# Patient Record
Sex: FEMALE | Race: WHITE | Employment: OTHER | URBAN - METROPOLITAN AREA
[De-identification: names, ages, dates, MRNs, and addresses within clinical notes are randomized per-mention and may not be internally consistent; named-entity substitution may affect disease eponyms.]

---

## 2017-01-01 ENCOUNTER — HOSPITAL ENCOUNTER (OUTPATIENT)
Dept: LAB | Age: 82
Discharge: HOME | End: 2017-07-25
Payer: MEDICARE

## 2017-01-01 ENCOUNTER — OFFICE VISIT (OUTPATIENT)
Dept: FAMILY MEDICINE CLINIC | Age: 82
End: 2017-01-01

## 2017-01-01 ENCOUNTER — TELEPHONE (OUTPATIENT)
Dept: FAMILY MEDICINE CLINIC | Age: 82
End: 2017-01-01

## 2017-01-01 ENCOUNTER — HOSPITAL ENCOUNTER (OUTPATIENT)
Dept: LAB | Age: 82
Discharge: HOME | End: 2017-09-15
Payer: MEDICARE

## 2017-01-01 ENCOUNTER — LAB ONLY (OUTPATIENT)
Dept: FAMILY MEDICINE CLINIC | Age: 82
End: 2017-01-01

## 2017-01-01 VITALS
OXYGEN SATURATION: 95 % | SYSTOLIC BLOOD PRESSURE: 155 MMHG | BODY MASS INDEX: 22.68 KG/M2 | RESPIRATION RATE: 18 BRPM | HEART RATE: 96 BPM | DIASTOLIC BLOOD PRESSURE: 91 MMHG | HEIGHT: 63 IN | WEIGHT: 128 LBS | TEMPERATURE: 98.3 F

## 2017-01-01 DIAGNOSIS — R79.89 ELEVATED LIVER FUNCTION TESTS: Primary | ICD-10-CM

## 2017-01-01 DIAGNOSIS — Z00.00 ANNUAL PHYSICAL EXAM: ICD-10-CM

## 2017-01-01 DIAGNOSIS — E55.9 VITAMIN D DEFICIENCY: ICD-10-CM

## 2017-01-01 DIAGNOSIS — Z00.00 MEDICARE ANNUAL WELLNESS VISIT, SUBSEQUENT: Primary | ICD-10-CM

## 2017-01-01 DIAGNOSIS — K21.00 GASTROESOPHAGEAL REFLUX DISEASE WITH ESOPHAGITIS: ICD-10-CM

## 2017-01-01 LAB
1,25(OH)2D3 SERPL-MCNC: 46 PG/ML (ref 19.9–79.3)
ALBUMIN SERPL-MCNC: 4.2 G/DL (ref 3.5–4.7)
ALBUMIN SERPL-MCNC: 4.4 G/DL (ref 3.5–4.7)
ALBUMIN/GLOB SERPL: 1.5 {RATIO} (ref 1.2–2.2)
ALBUMIN/GLOB SERPL: 1.6 {RATIO} (ref 1.2–2.2)
ALP SERPL-CCNC: 82 IU/L (ref 39–117)
ALP SERPL-CCNC: 84 IU/L (ref 39–117)
ALT SERPL-CCNC: 35 IU/L (ref 0–32)
ALT SERPL-CCNC: 40 IU/L (ref 0–32)
AST SERPL-CCNC: 31 IU/L (ref 0–40)
AST SERPL-CCNC: 39 IU/L (ref 0–40)
BILIRUB SERPL-MCNC: 0.5 MG/DL (ref 0–1.2)
BILIRUB SERPL-MCNC: 0.5 MG/DL (ref 0–1.2)
BUN SERPL-MCNC: 6 MG/DL (ref 8–27)
BUN SERPL-MCNC: 7 MG/DL (ref 8–27)
BUN/CREAT SERPL: 10 (ref 12–28)
BUN/CREAT SERPL: 8 (ref 12–28)
CALCIUM SERPL-MCNC: 9.8 MG/DL (ref 8.7–10.3)
CALCIUM SERPL-MCNC: 9.9 MG/DL (ref 8.7–10.3)
CHLORIDE SERPL-SCNC: 96 MMOL/L (ref 96–106)
CHLORIDE SERPL-SCNC: 97 MMOL/L (ref 96–106)
CO2 SERPL-SCNC: 22 MMOL/L (ref 18–29)
CO2 SERPL-SCNC: 25 MMOL/L (ref 18–29)
CREAT SERPL-MCNC: 0.68 MG/DL (ref 0.57–1)
CREAT SERPL-MCNC: 0.73 MG/DL (ref 0.57–1)
ERYTHROCYTE [DISTWIDTH] IN BLOOD BY AUTOMATED COUNT: 13.8 % (ref 12.3–15.4)
GLOBULIN SER CALC-MCNC: 2.8 G/DL (ref 1.5–4.5)
GLOBULIN SER CALC-MCNC: 2.8 G/DL (ref 1.5–4.5)
GLUCOSE SERPL-MCNC: 115 MG/DL (ref 65–99)
GLUCOSE SERPL-MCNC: 161 MG/DL (ref 65–99)
HCT VFR BLD AUTO: 41.6 % (ref 34–46.6)
HGB BLD-MCNC: 14 G/DL (ref 11.1–15.9)
MCH RBC QN AUTO: 29.9 PG (ref 26.6–33)
MCHC RBC AUTO-ENTMCNC: 33.7 G/DL (ref 31.5–35.7)
MCV RBC AUTO: 89 FL (ref 79–97)
PLATELET # BLD AUTO: 287 X10E3/UL (ref 150–379)
POTASSIUM SERPL-SCNC: 4 MMOL/L (ref 3.5–5.2)
POTASSIUM SERPL-SCNC: 4.9 MMOL/L (ref 3.5–5.2)
PROT SERPL-MCNC: 7 G/DL (ref 6–8.5)
PROT SERPL-MCNC: 7.2 G/DL (ref 6–8.5)
RBC # BLD AUTO: 4.69 X10E6/UL (ref 3.77–5.28)
SODIUM SERPL-SCNC: 137 MMOL/L (ref 134–144)
SODIUM SERPL-SCNC: 137 MMOL/L (ref 134–144)
WBC # BLD AUTO: 8.5 X10E3/UL (ref 3.4–10.8)

## 2017-01-01 PROCEDURE — 85027 COMPLETE CBC AUTOMATED: CPT

## 2017-01-01 PROCEDURE — 82652 VIT D 1 25-DIHYDROXY: CPT

## 2017-01-01 PROCEDURE — 80053 COMPREHEN METABOLIC PANEL: CPT

## 2017-01-01 PROCEDURE — 36415 COLL VENOUS BLD VENIPUNCTURE: CPT

## 2017-01-01 RX ORDER — ERGOCALCIFEROL 1.25 MG/1
50000 CAPSULE ORAL
Qty: 12 CAP | Refills: 0 | Status: SHIPPED | OUTPATIENT
Start: 2017-01-01 | End: 2018-01-01

## 2017-01-01 RX ORDER — HYDROGEN PEROXIDE 3 %
SOLUTION, NON-ORAL MISCELLANEOUS
Qty: 30 CAP | Refills: 1 | Status: SHIPPED | OUTPATIENT
Start: 2017-01-01 | End: 2018-01-01

## 2017-02-08 ENCOUNTER — TELEPHONE (OUTPATIENT)
Dept: FAMILY MEDICINE CLINIC | Age: 82
End: 2017-02-08

## 2017-02-08 NOTE — TELEPHONE ENCOUNTER
, Staci Garrison [phone]    Called to say he was to let nurse know when the patient has had these vaccines.       Flu  t-dap  Shingles   pneumonia

## 2017-07-25 NOTE — PROGRESS NOTES
FARIDEH Metz is a 80 y.o. female who presents for f/u of \"bladder issues\"  Pt here with , pt has noticeable memory issues,  is constantly correcting her responses to my questions. Per urology note scanned into Media, pt saw urology (Dr. Tio Madden, [facility]) 5/27 for frequent urination and urinary retention. Has hx of UTIs and urinary retention, was prescribed cipro for UTI. They state urologist told her to f/u with us but she doesn't know why she's here. Pt says she took the antibiotics but has not been taking anything since then. Dr. Marlo Castañeda note mentions to schedule her for a cystoscopy with him, pt says she had one done but no notes in media. Pt's  states the bladder had \"dropped half an inch\" and that Dr. Tio Madden wasn't conerned about it. Pt denies hx of retained urine even though in urology note it mentions she had 164cc retained. Say Urology hasn't said anything about further surgery. States first thing in the morning when she stands up she feels her muscles are weak and she feels she can't make it to the restroom. Uses the restroom an \"awful lot,\" about every 30 minutes. She feels pressure, no pain. No dysuria, no blood. Denies any episodes of urinary incontinence but she does wear a pad constantly. No issues with BMs, has one once/day. No diarrhea, no constipation, no episodes of fecal incontinence.  says she had one bad episode a few weeks ago (diarrhea), pt does not remember and says that is unusual. Denies feeling of urinary retention.  states pt has another appt with Dr. Tio Madden in another week, pt didn't remember. Tried to obtain urine sample today but \"it didn't work\". Unclear if she was unable to produce urine or if she missed the sample cup, pt unable to answer question and becomes confused with questioning.      Memory Loss  - MMSE 24 in 2/2016, 24 in 5/2015 and 26 in 12/2015  - Formal neuropsych testing in 2015 ( [facility]) - mild cognitive impairment and questionable attention deficit.   - Previously taking aricept on and off but \"felt bad while taking it\"  - Advised to continue mind exercises - puzzles, reading, etc.     Today, pt's  feels like her memory has gotten even worse.  says she can't remember things she has done 30 minutes prior. MMSE today - 22  - Pt oriented to season, day of the week, month, but not to year (1982) and date (20th) = 3/5  - Pt oriented to state, town but not to county or current building location = 3/5  - Pt able to repeat 3 unrelated objects = 3/3  - Spelling WORLD backwards = 4/5 (forgot R)  - Recalling 3 unrelated objects = 0/3  - Naming simple objects = 2/2  - Repeating Ifs Ands or Buts = 1/1  - Following 3-part directions = 3/3  - Following written instructions = 1/1  - Writing a sentence = 1/1 (Lets go out to lunch)  - Copying picture = 1/1  Total = 22/30     Low Vitamin D (14.9 on 2/2/16)  - Was previous taking high dose Vit D supplements but has not been taking for \"at least a few months\"  - Pt unsure why she stopped it    PMHx:  Past Medical History:   Diagnosis Date    Anxiety 4/23/2010    GERD (gastroesophageal reflux disease) 4/23/2010    Mild dementia     UTI (lower urinary tract infection) 4/23/2010     Meds:   Current Outpatient Prescriptions   Medication Sig Dispense Refill    ergocalciferol (ERGOCALCIFEROL) 50,000 unit capsule Take 1 Cap by mouth every seven (7) days. 12 Cap 0    multivit-mineral-iron-lutein (CENTRUM SILVER ULTRA WOMEN'S) tab tablet Take 1 Tab by mouth daily. 30 Tab 6    esomeprazole (NEXIUM) 20 mg capsule Take 1 Cap by mouth daily. 30 Cap 4    ESTRACE 0.01 % (0.1 mg/gram) vaginal cream   3    lidocaine (XYLOCAINE) 5 % ointment   3    vitamin e (E GEMS) 1,000 unit capsule Take two caps per day 60 Cap 11     Allergies:    Allergies   Allergen Reactions    Ciprofloxacin Unable to Obtain    Macrodantin [Nitrofurantoin Macrocrystalline] Rash    Pcn [Penicillins] Rash    Sulfa (Sulfonamide Antibiotics) Rash       Smoker:  History   Smoking Status    Never Smoker   Smokeless Tobacco    Never Used     ETOH:   History   Alcohol Use No     FH:   Family History   Problem Relation Age of Onset    Cancer Father      Medicare Wellness Screening Questions  General Health Questions   During the past 4 weeks:  - How would you rate your health in general? Good  - How often have you been bothered by feeling dizzy when standing up? occasionally  - How much have you been bothered by bodily pain? mildly  - Have you noticed any hearing difficulties? yes  - Has your physical and emotional health limited your social activities with family or friends? no     Emotional Health Questions   - Do you have a history of depression, anxiety, or emotional problems? no  - Over the past 2 weeks, have you felt down, depressed or hopeless? no  - Over the past 2 weeks, have you felt little interest or pleasure in doing things? no     Health Habits   - Please describe your diet habits:  cooks, eat TV dinners  - Do you get 5 servings of fruits or vegetables daily? no  - Do you exercise regularly? no     Activities of Daily Living and Functional Status   - Do you need help with eating, walking, dressing, bathing, toileting, the phone, transportation, shopping, preparing meals, housework, laundry, medications or managing money? yes  - In the past four weeks, was someone available to help you if you needed and wanted help with anything? yes  - Are you confident are you that you can control and manage most of your health problems? no  - Have you been given information to help you keep track of your medications? yes  - How often do you have trouble taking your medications as prescribed?  \"All the time\" -  watches patient take medications     Fall Risk and Home Safety   - Have you fallen 2 or more times in the past year? no  - Does your home have rugs in the hallway, lack grab bars in the bathroom, lack handrails on the stairs or have poor lighting? no  - Do you have smoke detectors and check them regularly? yes  - Do you have difficulties driving a car? Pt does not drive  - Do you always fasten your seat belt when you are in a car? yes      ROS:  Review of Systems  Physical Exam:  Visit Vitals    BP (!) 155/91 (BP 1 Location: Right arm, BP Patient Position: Sitting)    Pulse 96    Temp 98.3 °F (36.8 °C) (Oral)    Resp 18    Ht 5' 3\" (1.6 m)    Wt 128 lb (58.1 kg)    LMP 01/31/1995    SpO2 95%    BMI 22.67 kg/m2       Wt Readings from Last 3 Encounters:   07/25/17 128 lb (58.1 kg)   03/28/16 132 lb (59.9 kg)   02/02/16 132 lb 4 oz (60 kg)     BP Readings from Last 3 Encounters:   07/25/17 (!) 155/91   03/28/16 135/75   02/02/16 156/84      Physical Exam    Body mass index is 22.67  Was the patient's timed Up & Go test unsteady or longer than 30 seconds? no     Evaluation of Cognitive Function   Mood/affect:  happy  Orientation: Person and Situation  Appearance: age appropriate and casually dressed  Family member/caregiver input:  present    Preventive Services     (Preventive care checklist to be included in patient instructions)  Discussed today Done Previously Not Needed       2/2017   Pneumococcal vaccines     2/2017   Flu vaccine        Hepatitis B vaccine (if at risk)     2/2017   Shingles vaccine    2/2017   TDAP vaccine     X Mammogram      X Pap smear      X Colorectal cancer screening      X Low-dose CT for lung cancer screening    X   Bone density test    X   Glaucoma screening   X   Cholesterol test    X  Diabetes screening test      X Diabetes self-management class      X Nutritionist referral for diabetes or renal disease      Discussion of Advance Directive   Discussed with pther ability to prepare and advance directive in the case that an injury or illness causes her to be unable to make health care decisions.    Pt does not have an advanced directive, was given papework         Assessment     80 y.o. female with:    ICD-10-CM ICD-9-CM    1. Annual physical exam Z00.00 V70.0 VITAMIN D, 1, 25 DIHYDROXY      CBC W/O DIFF      METABOLIC PANEL, COMPREHENSIVE   2. Vitamin D deficiency E55.9 268.9 VITAMIN D, 1, 25 DIHYDROXY              Plan       Orders Placed This Encounter    VITAMIN D, 1, 25 DIHYDROXY    CBC W/O DIFF    METABOLIC PANEL, COMPREHENSIVE    ergocalciferol (ERGOCALCIFEROL) 50,000 unit capsule    multivit-mineral-iron-lutein (CENTRUM SILVER ULTRA WOMEN'S) tab tablet     Bladder Issues  - Follow up with urology (pt already has appointment)  - Defer treatment to urology  - Unable to perform UA today    Memory Loss  - MMSE 22, declined from previous 24 in 2016 and 26 in 2015, but not abnormal for her age    Vitamin D deficiency   - Check Vit D levels  - refilled prescription Vitamin D    Medicare Wellness  - CBC, CMP, lipid panel  - Discussed with patient going back on multivitamin, Vitamin E - sent to pharmacy  - Pt needs advanced directive - given paperwork and discussed with patient    Patient discussed and seen with Dr. Kishan Galarza    I have discussed the diagnosis with the patient and the intended plan as seen in the above orders. The patient has received an after-visit summary and questions were answered concerning future plans. I have discussed medication side effects and warnings with the patient as well.     Elizabeth Meek MD  Family Medicine Resident

## 2017-07-25 NOTE — PATIENT INSTRUCTIONS
We sent prescription Vitamin D to your pharmacy (take 1 tablet every week)  We also sent a multivitamin to your pharmacy (take 1 tablet daily)  And you still have refills for Vitamin E (take 1 tablet daily) at your pharmacy    Calcium/Vitamin D Supplement (By mouth)   Calcium (VINCENT-see-um), Vitamin D (VYE-ta-min D)  Supplies your body with calcium if you need more than you get in your diet. Calcium helps prevent osteoporosis (weak or brittle bones). Vitamin D helps your body use the calcium. Calcium and vitamin D are minerals that your body needs to work properly. Brand Name(s): Nima-Citrate, Nima-Citrate Plus Vitamin D, Calcet Petites, Calcium 600MG+D, Calcium Citrate +D3 Maximum, Caltrate 600 + D, Citracal + D, Citracal Calcium Citrate Petites with Vitamin D, Citracal Petites, Citracal Ultradense Calcium Citrate, Citracal Ultradense Calcium Citrate Petite w/Vit D, Citrus Calcium with Vitamin D, D-1000, D-2000, D3-400IU   There may be other brand names for this medicine. When This Medicine Should Not Be Used: You should not use this medicine if you have had an allergic reaction to calcium or vitamin D (ergocalciferol). How to Use This Medicine:   Tablet, Long Acting Tablet, Fizzy Tablet, Liquid Filled Capsule, Chewable Tablet  · Your doctor will tell you how much medicine to use. Do not use more than directed. · Follow the instructions on the medicine label if you are using this medicine without a prescription. Ask your pharmacist or health caregiver if you are not sure how much calcium you should take in one day. · Most calcium supplements should be taken with food, but some kinds of calcium (such as calcium citrate) can be taken with or without food. Ask your health care provider or read the label on the bottle to see if you need to take your specific kind of calcium with food. Drink a full glass of water (8 ounces) with each dose.   · If you are using the effervescent (fizzy) tablet, dissolve the tablet in about 6 to 8 ounces of water (3/4 cup to 1 cup). After the tablet is completely dissolved, drink this mixture right away. Do not save any mixture to take later. · Carefully follow your doctor's instructions about any special diet. · If you need to take more than one dose each a day, take each dose at evenly spaced times, unless your doctor has told you otherwise. If a dose is missed:   · Take a dose as soon as you remember. If it is almost time for your next dose, wait until then and take a regular dose. Do not take extra medicine to make up for a missed dose. How to Store and Dispose of This Medicine:   · Store the medicine in a closed container at room temperature, away from heat, moisture, and direct light. If the effervescent (fizzy) tablet comes packaged in foil, do not open the foil until you are ready to use each tablet. · Ask your pharmacist, doctor, or health caregiver about the best way to dispose of any outdated medicine or medicine no longer needed. · Keep all medicine out of the reach of children. Never share your medicine with anyone. Drugs and Foods to Avoid:   Ask your doctor or pharmacist before using any other medicine, including over-the-counter medicines, vitamins, and herbal products. · Make sure your doctor knows if you are also using other supplements or medicines that contain calcium. Tell your doctor if you are also using gallium nitrate (Ganite®), cellulose sodium phosphate (Calcibind®), or etidronate (Didronel®). · Calcium can change the way other medicines work if you take them at the same time. If you need to use other medicines, take them at least 2 hours before or 2 hours after you take your calcium supplement. This is particularly important if you are also using phenytoin (Dilantin®) or a tetracycline antibiotic to treat an infection (such as doxycycline, minocycline, Vibramycin®).   · Do not take your calcium supplement with a high-fiber meal (such as bran, whole-grain cereal or bread, fresh fruits). Do not smoke cigarettes or cigars. Do not drink large amounts of alcohol or caffeine (for example, more than about 8 cups of coffee). Warnings While Using This Medicine:   · Make sure your doctor knows if you are pregnant or breast feeding, or if you have kidney disease or have ever had kidney stones. Tell your doctor if you have had problems with too much calcium (hypercalcemia) or too little calcium in your blood (hypocalcemia). Some health problems that can cause hypercalcemia are sarcoidosis, or problems with your parathyroid gland. · You should not use certain brands of this medicine if you have kidney disease or are on dialysis, because they may harm your kidneys. Ask your caregiver what brands are best for you. · Some health problems can affect how much calcium you should take. Tell your doctor if you have stomach or digestion problems, such as on-going diarrhea, not absorbing nutrients properly, or not having enough acid in your stomach. · This medicine might contain phenylalanine (aspartame). This is only a concern if you have a disorder called phenylketonuria (a problem with amino acids). If you have this condition, talk to your doctor before using this medicine. · If you are using a large amount of calcium or using it for a long time, your doctor might need to check your blood on a regular basis. Be sure to keep all appointments. Possible Side Effects While Using This Medicine:   Call your doctor right away if you notice any of these side effects:  · Headache that will not go away, dry mouth, loss of appetite, severe constipation. If you notice other side effects that you think are caused by this medicine, tell your doctor. Call your doctor for medical advice about side effects.  You may report side effects to FDA at 3-171-FDA-0235  © 2017 [employer] Information is for End User's use only and may not be sold, redistributed or otherwise used for commercial purposes. The above information is an  only. It is not intended as medical advice for individual conditions or treatments. Talk to your doctor, nurse or pharmacist before following any medical regimen to see if it is safe and effective for you.

## 2017-07-25 NOTE — PROGRESS NOTES
Chief Complaint   Patient presents with    Urinary Frequency     1. Have you been to the ER, urgent care clinic since your last visit? Hospitalized since your last visit? No    2. Have you seen or consulted any other health care providers outside of the [address] [place] [address] since your last visit? Include any pap smears or colon screening.  No

## 2017-07-25 NOTE — LETTER
8/15/2017 8:11 AM 
 
Ms. Randi Cruz [address] [facility] Dear Smith Garcia: 
 
Please find your most recent results below. Resulted Orders VITAMIN D, 1, 25 DIHYDROXY Result Value Ref Range Calcitriol (Vit D 1, 25 di-OH) 46.0 19.9 - 79.3 pg/mL Narrative Performed at:  [address], [place]  [id number] : Piyush Nur MD, Phone:  [phone] CBC W/O DIFF Result Value Ref Range WBC 8.5 3.4 - 10.8 x10E3/uL  
 RBC 4.69 3.77 - 5.28 x10E6/uL HGB 14.0 11.1 - 15.9 g/dL HCT 41.6 34.0 - 46.6 % MCV 89 79 - 97 fL  
 MCH 29.9 26.6 - 33.0 pg  
 MCHC 33.7 31.5 - 35.7 g/dL  
 RDW 13.8 12.3 - 15.4 % PLATELET 662 655 - 977 x10E3/uL Narrative Performed at:  [address]  [id number] : Piyush Nur MD, Phone:  [phone] METABOLIC PANEL, COMPREHENSIVE Result Value Ref Range Glucose 115 (H) 65 - 99 mg/dL BUN 7 (L) 8 - 27 mg/dL Creatinine 0.68 0.57 - 1.00 mg/dL GFR est non-AA 81 >59 mL/min/1.73 GFR est AA 94 >59 mL/min/1.73  
 BUN/Creatinine ratio 10 (L) 12 - 28 Sodium 137 134 - 144 mmol/L Potassium 4.9 3.5 - 5.2 mmol/L Chloride 97 96 - 106 mmol/L  
 CO2 25 18 - 29 mmol/L Calcium 9.8 8.7 - 10.3 mg/dL Protein, total 7.2 6.0 - 8.5 g/dL Albumin 4.4 3.5 - 4.7 g/dL GLOBULIN, TOTAL 2.8 1.5 - 4.5 g/dL A-G Ratio 1.6 1.2 - 2.2 Bilirubin, total 0.5 0.0 - 1.2 mg/dL Alk. phosphatase 82 39 - 117 IU/L  
 AST (SGOT) 39 0 - 40 IU/L  
 ALT (SGPT) 40 (H) 0 - 32 IU/L Narrative Performed at:  [address]  [id number] : Piyush Nur MD, Phone:  [phone] RECOMMENDATIONS: 
 
Labs look ok No anemia Slight increase in liver function test -- recommend repeating the test fasting in one month Will order the test  
 
 
 Please call me if you have any questions: [phone] Sincerely, Emile Ceja MD

## 2017-07-25 NOTE — MR AVS SNAPSHOT
Visit Information Date & Time Provider Department Dept. Phone Encounter #  
 7/25/2017  9:00 AM Philip Vazquez [phone] [id number] Upcoming Health Maintenance Date Due ZOSTER VACCINE AGE 60> 4/5/1994 OSTEOPOROSIS SCREENING (DEXA) 6/5/1999 MEDICARE YEARLY EXAM 11/3/2016 GLAUCOMA SCREENING Q2Y 9/1/2017 INFLUENZA AGE 9 TO ADULT 8/1/2017 Pneumococcal 65+ Low/Medium Risk (2 of 2 - PPSV23) 11/30/2017 DTaP/Tdap/Td series (2 - Td) 11/15/2026 Allergies as of 7/25/2017  Review Complete On: 7/25/2017 By: Nova Gonzales LPN Severity Noted Reaction Type Reactions Ciprofloxacin  01/19/2012    Unable to Obtain Macrodantin [Nitrofurantoin Macrocrystalline]  04/23/2010    Rash Pcn [Penicillins]  04/23/2010    Rash  
 Sulfa (Sulfonamide Antibiotics)  04/23/2010    Rash Current Immunizations  Reviewed on 12/12/2016 Name Date Influenza High Dose Vaccine PF 11/15/2016 Pneumococcal Conjugate (PCV-13) 11/30/2016 Tdap 11/15/2016 Not reviewed this visit You Were Diagnosed With   
  
 Codes Comments Annual physical exam    -  Primary ICD-10-CM: Z00.00 ICD-9-CM: V70.0 Vitamin D deficiency     ICD-10-CM: E55.9 ICD-9-CM: 268.9 Vitals BP Pulse Temp Resp Height(growth percentile) Weight(growth percentile) (!) 155/91 (BP 1 Location: Right arm, BP Patient Position: Sitting) 96 98.3 °F (36.8 °C) (Oral) 18 5' 3\" (1.6 m) 128 lb (58.1 kg) LMP SpO2 BMI OB Status Smoking Status 01/31/1995 95% 22.67 kg/m2 Postmenopausal Never Smoker Vitals History BMI and BSA Data Body Mass Index Body Surface Area  
 22.67 kg/m 2 1.61 m 2 Preferred Pharmacy Pharmacy Name Phone [id number] [address] AT [place] [phone] Your Updated Medication List  
  
   
This list is accurate as of: 7/25/17 10:04 AM.  Always use your most recent med list.  
  
  
  
  
 ergocalciferol 50,000 unit capsule Commonly known as:  ERGOCALCIFEROL Take 1 Cap by mouth every seven (7) days. esomeprazole 20 mg capsule Commonly known as:  NexIUM Take 1 Cap by mouth daily. ESTRACE 0.01 % (0.1 mg/gram) vaginal cream  
Generic drug:  estradiol  
  
 lidocaine 5 % ointment Commonly known as:  XYLOCAINE  
  
 multivit-mineral-iron-lutein Tab tablet Commonly known as:  CENTRUM SILVER ULTRA WOMEN'S Take 1 Tab by mouth daily. vitamin e 1,000 unit capsule Commonly known as:  E GEMS Take two caps per day Prescriptions Sent to Pharmacy Refills  
 ergocalciferol (ERGOCALCIFEROL) 50,000 unit capsule 0 Sig: Take 1 Cap by mouth every seven (7) days. Class: Normal  
 Pharmacy: [address] [place] [address] Ph #: [phone] Route: Oral  
 multivit-mineral-iron-lutein (CENTRUM SILVER ULTRA WOMEN'S) tab tablet 6 Sig: Take 1 Tab by mouth daily. Class: Normal  
 Pharmacy: [address] [place] [address] Ph #: [phone] Route: Oral  
  
We Performed the Following CBC W/O DIFF [26793 CPT(R)] METABOLIC PANEL, COMPREHENSIVE [83342 CPT(R)] VITAMIN D, 1, 25 DIHYDROXY [77934 CPT(R)] Patient Instructions We sent prescription Vitamin D to your pharmacy (take 1 tablet every week) We also sent a multivitamin to your pharmacy (take 1 tablet daily) And you still have refills for Vitamin E (take 1 tablet daily) at your pharmacy Calcium/Vitamin D Supplement (By mouth) Calcium (VINCENT-see-um), Vitamin D (VYE-ta-min D) Supplies your body with calcium if you need more than you get in your diet. Calcium helps prevent osteoporosis (weak or brittle bones). Vitamin D helps your body use the calcium. Calcium and vitamin D are minerals that your body needs to work properly. Brand Name(s): Nima-Citrate, Niam-Citrate Plus Vitamin D, Calcet Petites, Calcium 600MG+D, Calcium Citrate +D3 Maximum, Caltrate 600 + D, Citracal + D, Citracal Calcium Citrate Petites with Vitamin D, Citracal Petites, Citracal Ultradense Calcium Citrate, Citracal Ultradense Calcium Citrate Petite w/Vit D, Citrus Calcium with Vitamin D, D-1000, D-2000, D3-400IU There may be other brand names for this medicine. When This Medicine Should Not Be Used: You should not use this medicine if you have had an allergic reaction to calcium or vitamin D (ergocalciferol). How to Use This Medicine:  
Tablet, Long Acting Tablet, Fizzy Tablet, Liquid Filled Capsule, Chewable Tablet · Your doctor will tell you how much medicine to use. Do not use more than directed. · Follow the instructions on the medicine label if you are using this medicine without a prescription. Ask your pharmacist or health caregiver if you are not sure how much calcium you should take in one day. · Most calcium supplements should be taken with food, but some kinds of calcium (such as calcium citrate) can be taken with or without food. Ask your health care provider or read the label on the bottle to see if you need to take your specific kind of calcium with food. Drink a full glass of water (8 ounces) with each dose. · If you are using the effervescent (fizzy) tablet, dissolve the tablet in about 6 to 8 ounces of water (3/4 cup to 1 cup). After the tablet is completely dissolved, drink this mixture right away. Do not save any mixture to take later. · Carefully follow your doctor's instructions about any special diet. · If you need to take more than one dose each a day, take each dose at evenly spaced times, unless your doctor has told you otherwise. If a dose is missed: · Take a dose as soon as you remember. If it is almost time for your next dose, wait until then and take a regular dose. Do not take extra medicine to make up for a missed dose. How to Store and Dispose of This Medicine: · Store the medicine in a closed container at room temperature, away from heat, moisture, and direct light. If the effervescent (fizzy) tablet comes packaged in foil, do not open the foil until you are ready to use each tablet. · Ask your pharmacist, doctor, or health caregiver about the best way to dispose of any outdated medicine or medicine no longer needed. · Keep all medicine out of the reach of children. Never share your medicine with anyone. Drugs and Foods to Avoid: Ask your doctor or pharmacist before using any other medicine, including over-the-counter medicines, vitamins, and herbal products. · Make sure your doctor knows if you are also using other supplements or medicines that contain calcium. Tell your doctor if you are also using gallium nitrate (Ganite®), cellulose sodium phosphate (Calcibind®), or etidronate (Didronel®). · Calcium can change the way other medicines work if you take them at the same time. If you need to use other medicines, take them at least 2 hours before or 2 hours after you take your calcium supplement. This is particularly important if you are also using phenytoin (Dilantin®) or a tetracycline antibiotic to treat an infection (such as doxycycline, minocycline, Vibramycin®). · Do not take your calcium supplement with a high-fiber meal (such as bran, whole-grain cereal or bread, fresh fruits). Do not smoke cigarettes or cigars. Do not drink large amounts of alcohol or caffeine (for example, more than about 8 cups of coffee). Warnings While Using This Medicine: · Make sure your doctor knows if you are pregnant or breast feeding, or if you have kidney disease or have ever had kidney stones. Tell your doctor if you have had problems with too much calcium (hypercalcemia) or too little calcium in your blood (hypocalcemia). Some health problems that can cause hypercalcemia are sarcoidosis, or problems with your parathyroid gland. · You should not use certain brands of this medicine if you have kidney disease or are on dialysis, because they may harm your kidneys. Ask your caregiver what brands are best for you. · Some health problems can affect how much calcium you should take. Tell your doctor if you have stomach or digestion problems, such as on-going diarrhea, not absorbing nutrients properly, or not having enough acid in your stomach. · This medicine might contain phenylalanine (aspartame). This is only a concern if you have a disorder called phenylketonuria (a problem with amino acids). If you have this condition, talk to your doctor before using this medicine. · If you are using a large amount of calcium or using it for a long time, your doctor might need to check your blood on a regular basis. Be sure to keep all appointments. Possible Side Effects While Using This Medicine:  
Call your doctor right away if you notice any of these side effects: 
· Headache that will not go away, dry mouth, loss of appetite, severe constipation. If you notice other side effects that you think are caused by this medicine, tell your doctor. Call your doctor for medical advice about side effects. You may report side effects to FDA at 4-663-FDA-8841 © 2017 [address] Information is for End User's use only and may not be sold, redistributed or otherwise used for commercial purposes. The above information is an  only. It is not intended as medical advice for individual conditions or treatments. Talk to your doctor, nurse or pharmacist before following any medical regimen to see if it is safe and effective for you. Introducing [facility] [place] & HEALTH SERVICES! Karolina Gross introduces [employer] patient portal. Now you can access parts of your medical record, email your doctor's office, and request medication refills online. 1. In your internet browser, go to [email or web address] 2. Click on the First Time User? Click Here link in the Sign In box. You will see the New Member Sign Up page. 3. Enter your Rollerscoot Access Code exactly as it appears below. You will not need to use this code after youve completed the sign-up process. If you do not sign up before the expiration date, you must request a new code. · Rollerscoot Access Code: [email or web address] Expires: 10/23/2017 10:03 AM 
 
4. Enter the last four digits of your Social Security Number (xxxx) and Date of Birth (mm/dd/yyyy) as indicated and click Submit. You will be taken to the next sign-up page. 5. Create a Rollerscoot ID. This will be your Rollerscoot login ID and cannot be changed, so think of one that is secure and easy to remember. 6. Create a Rollerscoot password. You can change your password at any time. 7. Enter your Password Reset Question and Answer. This can be used at a later time if you forget your password. 8. Enter your e-mail address. You will receive e-mail notification when new information is available in [facility] 9. Click Sign Up. You can now view and download portions of your medical record. 10. Click the Download Summary menu link to download a portable copy of your medical information. If you have questions, please visit the Frequently Asked Questions section of the Rollerscoot website. Remember, Rollerscoot is NOT to be used for urgent needs. For medical emergencies, dial 911. Now available from your iPhone and Android! Please provide this summary of care documentation to your next provider. Your primary care clinician is listed as [facility]. If you have any questions after today's visit, please call [phone].

## 2017-08-14 NOTE — LETTER
9/18/2017 9:40 AM 
 
Ms. Randi Cruz [address] [facility] Dear Gayle Angel: 
 
Please find your most recent results below. Resulted Orders METABOLIC PANEL, COMPREHENSIVE Result Value Ref Range Glucose 161 (H) 65 - 99 mg/dL BUN 6 (L) 8 - 27 mg/dL Creatinine 0.73 0.57 - 1.00 mg/dL GFR est non-AA 76 >59 mL/min/1.73 GFR est AA 88 >59 mL/min/1.73  
 BUN/Creatinine ratio 8 (L) 12 - 28 Sodium 137 134 - 144 mmol/L Potassium 4.0 3.5 - 5.2 mmol/L Chloride 96 96 - 106 mmol/L  
 CO2 22 18 - 29 mmol/L Calcium 9.9 8.7 - 10.3 mg/dL Protein, total 7.0 6.0 - 8.5 g/dL Albumin 4.2 3.5 - 4.7 g/dL GLOBULIN, TOTAL 2.8 1.5 - 4.5 g/dL A-G Ratio 1.5 1.2 - 2.2 Bilirubin, total 0.5 0.0 - 1.2 mg/dL Alk. phosphatase 84 39 - 117 IU/L  
 AST (SGOT) 31 0 - 40 IU/L  
 ALT (SGPT) 35 (H) 0 - 32 IU/L Narrative Performed at:  [address]  [id number] : Josafat Parker MD, Phone:  [phone] RECOMMENDATIONS: 
 
Liver function test minimally elevated -- less than it was previously Recommend rechecking in 6 months Please call me if you have any questions: [phone] Sincerely, Emile Ceja MD

## 2017-08-15 NOTE — PROGRESS NOTES
Labs look ok  No anemia  Slight increase in liver function test -- recommend repeating the test fasting in one month  Will order the test

## 2017-09-13 NOTE — TELEPHONE ENCOUNTER
----- Message from Holger Willis sent at 9/13/2017 11:45 AM EDT -----  Regarding: Dr. Abbott/Telephone  The patient's  Mercedes Sahu is requesting a call back to confirm when the patient's liver test is scheduled.  [phone])[phone]

## 2017-09-15 NOTE — MR AVS SNAPSHOT
Visit Information Date & Time Provider Department Dept. Phone Encounter #  
 9/15/2017 11:00 AM LAB [facility] [address] [phone] [id number] Upcoming Health Maintenance Date Due ZOSTER VACCINE AGE 60> 4/5/1994 OSTEOPOROSIS SCREENING (DEXA) 6/5/1999 INFLUENZA AGE 9 TO ADULT 8/1/2017 GLAUCOMA SCREENING Q2Y 9/1/2017 Pneumococcal 65+ Low/Medium Risk (2 of 2 - PPSV23) 11/30/2017 MEDICARE YEARLY EXAM 7/26/2018 DTaP/Tdap/Td series (2 - Td) 11/15/2026 Allergies as of 9/15/2017  Review Complete On: 7/25/2017 By: Hallie Cadena MD  
  
 Severity Noted Reaction Type Reactions Ciprofloxacin  01/19/2012    Unable to Obtain Macrodantin [Nitrofurantoin Macrocrystalline]  04/23/2010    Rash Pcn [Penicillins]  04/23/2010    Rash  
 Sulfa (Sulfonamide Antibiotics)  04/23/2010    Rash Current Immunizations  Reviewed on 12/12/2016 Name Date Influenza High Dose Vaccine PF 11/15/2016 Pneumococcal Conjugate (PCV-13) 11/30/2016 Tdap 11/15/2016 Not reviewed this visit Vitals LMP OB Status Smoking Status 01/31/1995 Postmenopausal Never Smoker Preferred Pharmacy Pharmacy Name Phone [id number] [address] AT [facility] [phone] Your Updated Medication List  
  
   
This list is accurate as of: 9/15/17  5:15 PM.  Always use your most recent med list.  
  
  
  
  
 ergocalciferol 50,000 unit capsule Commonly known as:  ERGOCALCIFEROL Take 1 Cap by mouth every seven (7) days. esomeprazole 20 mg capsule Commonly known as:  NEXIUM  
TAKE 1 CAPSULE BY MOUTH DAILY. multivit-mineral-iron-lutein Tab tablet Commonly known as:  CENTRUM SILVER ULTRA WOMEN'S Take 1 Tab by mouth daily. vitamin e 1,000 unit capsule Commonly known as:  E GEMS Take two caps per day Introducing hospitals & HEALTH SERVICES! [facility] introduces Getting-in patient portal. Now you can access parts of your medical record, email your doctor's office, and request medication refills online. 1. In your internet browser, go to [email or web address] 2. Click on the First Time User? Click Here link in the Sign In box. You will see the New Member Sign Up page. 3. Enter your Getting-in Access Code exactly as it appears below. You will not need to use this code after youve completed the sign-up process. If you do not sign up before the expiration date, you must request a new code. · Getting-in Access Code: [email or web address] Expires: 10/23/2017 10:03 AM 
 
4. Enter the last four digits of your Social Security Number (xxxx) and Date of Birth (mm/dd/yyyy) as indicated and click Submit. You will be taken to the next sign-up page. 5. Create a Getting-in ID. This will be your Getting-in login ID and cannot be changed, so think of one that is secure and easy to remember. 6. Create a Getting-in password. You can change your password at any time. 7. Enter your Password Reset Question and Answer. This can be used at a later time if you forget your password. 8. Enter your e-mail address. You will receive e-mail notification when new information is available in [address] 9. Click Sign Up. You can now view and download portions of your medical record. 10. Click the Download Summary menu link to download a portable copy of your medical information. If you have questions, please visit the Frequently Asked Questions section of the Getting-in website. Remember, Getting-in is NOT to be used for urgent needs. For medical emergencies, dial 911. Now available from your iPhone and Android! Please provide this summary of care documentation to your next provider. Your primary care clinician is listed as [facility], . If you have any questions after today's visit, please call [phone].

## 2017-09-18 NOTE — PROGRESS NOTES
Liver function test minimally elevated -- less than it was previously  Recommend rechecking in 6 months

## 2018-01-01 ENCOUNTER — APPOINTMENT (OUTPATIENT)
Dept: GENERAL RADIOLOGY | Age: 83
DRG: 862 | End: 2018-01-01
Attending: PHYSICIAN ASSISTANT
Payer: MEDICARE

## 2018-01-01 ENCOUNTER — APPOINTMENT (OUTPATIENT)
Dept: GENERAL RADIOLOGY | Age: 83
DRG: 853 | End: 2018-01-01
Attending: EMERGENCY MEDICINE
Payer: MEDICARE

## 2018-01-01 ENCOUNTER — PATIENT OUTREACH (OUTPATIENT)
Dept: FAMILY MEDICINE CLINIC | Age: 83
End: 2018-01-01

## 2018-01-01 ENCOUNTER — ANESTHESIA EVENT (OUTPATIENT)
Dept: SURGERY | Age: 83
DRG: 853 | End: 2018-01-01
Payer: MEDICARE

## 2018-01-01 ENCOUNTER — PATIENT OUTREACH (OUTPATIENT)
Dept: CASE MANAGEMENT | Age: 83
End: 2018-01-01

## 2018-01-01 ENCOUNTER — APPOINTMENT (OUTPATIENT)
Dept: PHYSICAL THERAPY | Age: 83
End: 2018-01-01
Payer: MEDICARE

## 2018-01-01 ENCOUNTER — HOSPITAL ENCOUNTER (INPATIENT)
Age: 83
LOS: 4 days | Discharge: NURSING, REHAB OR LONG-TERM CARE | DRG: 853 | End: 2018-05-03
Attending: EMERGENCY MEDICINE | Admitting: FAMILY MEDICINE
Payer: MEDICARE

## 2018-01-01 ENCOUNTER — APPOINTMENT (OUTPATIENT)
Dept: PHYSICAL THERAPY | Age: 83
End: 2018-01-01

## 2018-01-01 ENCOUNTER — OFFICE VISIT (OUTPATIENT)
Dept: FAMILY MEDICINE CLINIC | Age: 83
End: 2018-01-01

## 2018-01-01 ENCOUNTER — APPOINTMENT (OUTPATIENT)
Dept: ULTRASOUND IMAGING | Age: 83
DRG: 853 | End: 2018-01-01
Attending: EMERGENCY MEDICINE
Payer: MEDICARE

## 2018-01-01 ENCOUNTER — ANESTHESIA (OUTPATIENT)
Dept: SURGERY | Age: 83
DRG: 853 | End: 2018-01-01
Payer: MEDICARE

## 2018-01-01 ENCOUNTER — APPOINTMENT (OUTPATIENT)
Dept: CT IMAGING | Age: 83
DRG: 862 | End: 2018-01-01
Attending: PHYSICIAN ASSISTANT
Payer: MEDICARE

## 2018-01-01 ENCOUNTER — APPOINTMENT (OUTPATIENT)
Dept: CT IMAGING | Age: 83
DRG: 853 | End: 2018-01-01
Attending: EMERGENCY MEDICINE
Payer: MEDICARE

## 2018-01-01 ENCOUNTER — HOSPITAL ENCOUNTER (OUTPATIENT)
Dept: PHYSICAL THERAPY | Age: 83
Discharge: HOME | End: 2018-04-17
Payer: MEDICARE

## 2018-01-01 ENCOUNTER — HOSPITAL ENCOUNTER (OUTPATIENT)
Dept: LAB | Age: 83
Discharge: HOME | End: 2018-04-05
Payer: MEDICARE

## 2018-01-01 ENCOUNTER — TELEPHONE (OUTPATIENT)
Dept: FAMILY MEDICINE CLINIC | Age: 83
End: 2018-01-01

## 2018-01-01 ENCOUNTER — HOSPITAL ENCOUNTER (INPATIENT)
Age: 83
LOS: 8 days | Discharge: NURSING, REHAB OR LONG-TERM CARE | DRG: 862 | End: 2018-05-19
Attending: EMERGENCY MEDICINE | Admitting: SURGERY
Payer: MEDICARE

## 2018-01-01 VITALS
HEIGHT: 63 IN | BODY MASS INDEX: 21.09 KG/M2 | SYSTOLIC BLOOD PRESSURE: 137 MMHG | HEART RATE: 82 BPM | OXYGEN SATURATION: 95 % | RESPIRATION RATE: 16 BRPM | DIASTOLIC BLOOD PRESSURE: 80 MMHG | TEMPERATURE: 97.7 F | WEIGHT: 119 LBS

## 2018-01-01 VITALS
DIASTOLIC BLOOD PRESSURE: 66 MMHG | BODY MASS INDEX: 23.05 KG/M2 | OXYGEN SATURATION: 96 % | RESPIRATION RATE: 14 BRPM | WEIGHT: 130.07 LBS | HEART RATE: 93 BPM | TEMPERATURE: 98.5 F | SYSTOLIC BLOOD PRESSURE: 133 MMHG | HEIGHT: 63 IN

## 2018-01-01 VITALS
OXYGEN SATURATION: 97 % | WEIGHT: 130.51 LBS | BODY MASS INDEX: 23.12 KG/M2 | HEIGHT: 63 IN | RESPIRATION RATE: 18 BRPM | SYSTOLIC BLOOD PRESSURE: 130 MMHG | HEART RATE: 78 BPM | DIASTOLIC BLOOD PRESSURE: 76 MMHG | TEMPERATURE: 98.2 F

## 2018-01-01 DIAGNOSIS — Z90.49 S/P APPENDECTOMY: ICD-10-CM

## 2018-01-01 DIAGNOSIS — K65.1 INTRA-ABDOMINAL ABSCESS (HCC): Primary | ICD-10-CM

## 2018-01-01 DIAGNOSIS — R39.15 URINARY URGENCY: ICD-10-CM

## 2018-01-01 DIAGNOSIS — E88.09 HYPOALBUMINEMIA: ICD-10-CM

## 2018-01-01 DIAGNOSIS — R10.84 GENERALIZED ABDOMINAL PAIN: ICD-10-CM

## 2018-01-01 DIAGNOSIS — Z71.89 GOALS OF CARE, COUNSELING/DISCUSSION: ICD-10-CM

## 2018-01-01 DIAGNOSIS — R41.3 IMPAIRED MEMORY: ICD-10-CM

## 2018-01-01 DIAGNOSIS — R10.31 RIGHT LOWER QUADRANT ABDOMINAL PAIN: ICD-10-CM

## 2018-01-01 DIAGNOSIS — Z71.89 DNR (DO NOT RESUSCITATE) DISCUSSION: ICD-10-CM

## 2018-01-01 DIAGNOSIS — A41.9 SEPSIS, DUE TO UNSPECIFIED ORGANISM: Primary | ICD-10-CM

## 2018-01-01 DIAGNOSIS — Z66 DNR (DO NOT RESUSCITATE): ICD-10-CM

## 2018-01-01 DIAGNOSIS — R10.9 ABDOMINAL PAIN, UNSPECIFIED ABDOMINAL LOCATION: ICD-10-CM

## 2018-01-01 DIAGNOSIS — R53.81 PHYSICAL DEBILITY: ICD-10-CM

## 2018-01-01 DIAGNOSIS — R45.1 AGITATION: ICD-10-CM

## 2018-01-01 DIAGNOSIS — R14.0 ABDOMINAL DISTENSION: Primary | ICD-10-CM

## 2018-01-01 DIAGNOSIS — K64.9 BLEEDING HEMORRHOID: ICD-10-CM

## 2018-01-01 DIAGNOSIS — R39.15 URINARY URGENCY: Primary | ICD-10-CM

## 2018-01-01 LAB
ABO + RH BLD: NORMAL
ALBUMIN SERPL-MCNC: 1.7 G/DL (ref 3.5–5)
ALBUMIN SERPL-MCNC: 1.8 G/DL (ref 3.5–5)
ALBUMIN SERPL-MCNC: 2.1 G/DL (ref 3.5–5)
ALBUMIN SERPL-MCNC: 2.4 G/DL (ref 3.5–5)
ALBUMIN SERPL-MCNC: 2.9 G/DL (ref 3.5–5)
ALBUMIN SERPL-MCNC: 3.6 G/DL (ref 3.5–5)
ALBUMIN/GLOB SERPL: 0.5 {RATIO} (ref 1.1–2.2)
ALBUMIN/GLOB SERPL: 0.6 {RATIO} (ref 1.1–2.2)
ALBUMIN/GLOB SERPL: 0.6 {RATIO} (ref 1.1–2.2)
ALBUMIN/GLOB SERPL: 0.7 {RATIO} (ref 1.1–2.2)
ALBUMIN/GLOB SERPL: 0.8 {RATIO} (ref 1.1–2.2)
ALBUMIN/GLOB SERPL: 0.9 {RATIO} (ref 1.1–2.2)
ALP SERPL-CCNC: 34 U/L (ref 45–117)
ALP SERPL-CCNC: 36 U/L (ref 45–117)
ALP SERPL-CCNC: 39 U/L (ref 45–117)
ALP SERPL-CCNC: 60 U/L (ref 45–117)
ALP SERPL-CCNC: 61 U/L (ref 45–117)
ALP SERPL-CCNC: 81 U/L (ref 45–117)
ALT SERPL-CCNC: 11 U/L (ref 12–78)
ALT SERPL-CCNC: 14 U/L (ref 12–78)
ALT SERPL-CCNC: 14 U/L (ref 12–78)
ALT SERPL-CCNC: 21 U/L (ref 12–78)
ALT SERPL-CCNC: 24 U/L (ref 12–78)
ALT SERPL-CCNC: 7 U/L (ref 12–78)
ANION GAP SERPL CALC-SCNC: 11 MMOL/L (ref 5–15)
ANION GAP SERPL CALC-SCNC: 11 MMOL/L (ref 5–15)
ANION GAP SERPL CALC-SCNC: 4 MMOL/L (ref 5–15)
ANION GAP SERPL CALC-SCNC: 6 MMOL/L (ref 5–15)
ANION GAP SERPL CALC-SCNC: 6 MMOL/L (ref 5–15)
ANION GAP SERPL CALC-SCNC: 7 MMOL/L (ref 5–15)
ANION GAP SERPL CALC-SCNC: 7 MMOL/L (ref 5–15)
ANION GAP SERPL CALC-SCNC: 8 MMOL/L (ref 5–15)
ANION GAP SERPL CALC-SCNC: 8 MMOL/L (ref 5–15)
ANION GAP SERPL CALC-SCNC: 9 MMOL/L (ref 5–15)
ANION GAP SERPL CALC-SCNC: 9 MMOL/L (ref 5–15)
APPEARANCE UR: ABNORMAL
APPEARANCE UR: CLEAR
APTT PPP: 29.9 SEC (ref 22.1–32)
AST SERPL-CCNC: 14 U/L (ref 15–37)
AST SERPL-CCNC: 16 U/L (ref 15–37)
AST SERPL-CCNC: 19 U/L (ref 15–37)
AST SERPL-CCNC: 20 U/L (ref 15–37)
ATRIAL RATE: 340 BPM
ATRIAL RATE: 84 BPM
ATRIAL RATE: 98 BPM
BACTERIA SPEC CULT: ABNORMAL
BACTERIA SPEC CULT: NORMAL
BACTERIA UR CULT: ABNORMAL
BACTERIA URNS QL MICRO: NEGATIVE /HPF
BACTERIA URNS QL MICRO: NEGATIVE /HPF
BASE DEFICIT BLDV-SCNC: 3 MMOL/L
BASOPHILS # BLD: 0 K/UL (ref 0–0.1)
BASOPHILS # BLD: 0.1 K/UL (ref 0–0.1)
BASOPHILS NFR BLD: 0 % (ref 0–1)
BDY SITE: ABNORMAL
BILIRUB SERPL-MCNC: 0.4 MG/DL (ref 0.2–1)
BILIRUB SERPL-MCNC: 0.5 MG/DL (ref 0.2–1)
BILIRUB SERPL-MCNC: 0.6 MG/DL (ref 0.2–1)
BILIRUB SERPL-MCNC: 0.8 MG/DL (ref 0.2–1)
BILIRUB SERPL-MCNC: 0.8 MG/DL (ref 0.2–1)
BILIRUB SERPL-MCNC: 0.9 MG/DL (ref 0.2–1)
BILIRUB UR QL STRIP: NEGATIVE
BILIRUB UR QL: NEGATIVE
BILIRUB UR QL: NEGATIVE
BLOOD GROUP ANTIBODIES SERPL: NORMAL
BNP SERPL-MCNC: 1757 PG/ML (ref 0–450)
BUN SERPL-MCNC: 1 MG/DL (ref 6–20)
BUN SERPL-MCNC: 1 MG/DL (ref 6–20)
BUN SERPL-MCNC: 10 MG/DL (ref 6–20)
BUN SERPL-MCNC: 11 MG/DL (ref 6–20)
BUN SERPL-MCNC: 11 MG/DL (ref 6–20)
BUN SERPL-MCNC: 2 MG/DL (ref 6–20)
BUN SERPL-MCNC: 3 MG/DL (ref 6–20)
BUN SERPL-MCNC: 3 MG/DL (ref 6–20)
BUN SERPL-MCNC: 4 MG/DL (ref 6–20)
BUN SERPL-MCNC: 7 MG/DL (ref 6–20)
BUN SERPL-MCNC: 7 MG/DL (ref 6–20)
BUN/CREAT SERPL: 14 (ref 12–20)
BUN/CREAT SERPL: 17 (ref 12–20)
BUN/CREAT SERPL: 19 (ref 12–20)
BUN/CREAT SERPL: 2 (ref 12–20)
BUN/CREAT SERPL: 2 (ref 12–20)
BUN/CREAT SERPL: 4 (ref 12–20)
BUN/CREAT SERPL: 5 (ref 12–20)
BUN/CREAT SERPL: 5 (ref 12–20)
BUN/CREAT SERPL: 7 (ref 12–20)
BUN/CREAT SERPL: 8 (ref 12–20)
BUN/CREAT SERPL: 8 (ref 12–20)
CALCIUM SERPL-MCNC: 7.5 MG/DL (ref 8.5–10.1)
CALCIUM SERPL-MCNC: 8 MG/DL (ref 8.5–10.1)
CALCIUM SERPL-MCNC: 8 MG/DL (ref 8.5–10.1)
CALCIUM SERPL-MCNC: 8.3 MG/DL (ref 8.5–10.1)
CALCIUM SERPL-MCNC: 8.3 MG/DL (ref 8.5–10.1)
CALCIUM SERPL-MCNC: 8.4 MG/DL (ref 8.5–10.1)
CALCIUM SERPL-MCNC: 8.6 MG/DL (ref 8.5–10.1)
CALCIUM SERPL-MCNC: 8.7 MG/DL (ref 8.5–10.1)
CALCIUM SERPL-MCNC: 8.9 MG/DL (ref 8.5–10.1)
CALCIUM SERPL-MCNC: 9.1 MG/DL (ref 8.5–10.1)
CALCIUM SERPL-MCNC: 9.5 MG/DL (ref 8.5–10.1)
CALCULATED P AXIS, ECG09: -4 DEGREES
CALCULATED P AXIS, ECG09: 11 DEGREES
CALCULATED R AXIS, ECG10: -24 DEGREES
CALCULATED R AXIS, ECG10: -25 DEGREES
CALCULATED R AXIS, ECG10: -27 DEGREES
CALCULATED T AXIS, ECG11: -2 DEGREES
CALCULATED T AXIS, ECG11: 65 DEGREES
CALCULATED T AXIS, ECG11: 7 DEGREES
CC UR VC: ABNORMAL
CC UR VC: NORMAL
CHLORIDE SERPL-SCNC: 101 MMOL/L (ref 97–108)
CHLORIDE SERPL-SCNC: 102 MMOL/L (ref 97–108)
CHLORIDE SERPL-SCNC: 103 MMOL/L (ref 97–108)
CHLORIDE SERPL-SCNC: 106 MMOL/L (ref 97–108)
CHLORIDE SERPL-SCNC: 106 MMOL/L (ref 97–108)
CHLORIDE SERPL-SCNC: 107 MMOL/L (ref 97–108)
CHLORIDE SERPL-SCNC: 99 MMOL/L (ref 97–108)
CHOLEST SERPL-MCNC: 150 MG/DL
CO2 SERPL-SCNC: 19 MMOL/L (ref 21–32)
CO2 SERPL-SCNC: 21 MMOL/L (ref 21–32)
CO2 SERPL-SCNC: 21 MMOL/L (ref 21–32)
CO2 SERPL-SCNC: 25 MMOL/L (ref 21–32)
CO2 SERPL-SCNC: 26 MMOL/L (ref 21–32)
CO2 SERPL-SCNC: 27 MMOL/L (ref 21–32)
CO2 SERPL-SCNC: 28 MMOL/L (ref 21–32)
COLOR UR: ABNORMAL
COLOR UR: NORMAL
CREAT SERPL-MCNC: 0.49 MG/DL (ref 0.55–1.02)
CREAT SERPL-MCNC: 0.54 MG/DL (ref 0.55–1.02)
CREAT SERPL-MCNC: 0.58 MG/DL (ref 0.55–1.02)
CREAT SERPL-MCNC: 0.59 MG/DL (ref 0.55–1.02)
CREAT SERPL-MCNC: 0.6 MG/DL (ref 0.55–1.02)
CREAT SERPL-MCNC: 0.65 MG/DL (ref 0.55–1.02)
CREAT SERPL-MCNC: 0.7 MG/DL (ref 0.55–1.02)
CREAT SERPL-MCNC: 0.88 MG/DL (ref 0.55–1.02)
CREAT SERPL-MCNC: 0.93 MG/DL (ref 0.55–1.02)
DIAGNOSIS, 93000: NORMAL
DIFFERENTIAL METHOD BLD: ABNORMAL
EOSINOPHIL # BLD: 0 K/UL (ref 0–0.4)
EOSINOPHIL # BLD: 0.1 K/UL (ref 0–0.4)
EOSINOPHIL NFR BLD: 0 % (ref 0–7)
EOSINOPHIL NFR BLD: 1 % (ref 0–7)
EPITH CASTS URNS QL MICRO: ABNORMAL /LPF
EPITH CASTS URNS QL MICRO: NORMAL /LPF
ERYTHROCYTE [DISTWIDTH] IN BLOOD BY AUTOMATED COUNT: 12.6 % (ref 11.5–14.5)
ERYTHROCYTE [DISTWIDTH] IN BLOOD BY AUTOMATED COUNT: 12.8 % (ref 11.5–14.5)
ERYTHROCYTE [DISTWIDTH] IN BLOOD BY AUTOMATED COUNT: 13 % (ref 11.5–14.5)
ERYTHROCYTE [DISTWIDTH] IN BLOOD BY AUTOMATED COUNT: 13 % (ref 11.5–14.5)
ERYTHROCYTE [DISTWIDTH] IN BLOOD BY AUTOMATED COUNT: 13.2 % (ref 11.5–14.5)
ERYTHROCYTE [DISTWIDTH] IN BLOOD BY AUTOMATED COUNT: 13.3 % (ref 11.5–14.5)
ERYTHROCYTE [DISTWIDTH] IN BLOOD BY AUTOMATED COUNT: 13.5 % (ref 11.5–14.5)
ERYTHROCYTE [DISTWIDTH] IN BLOOD BY AUTOMATED COUNT: 13.6 % (ref 11.5–14.5)
ERYTHROCYTE [DISTWIDTH] IN BLOOD BY AUTOMATED COUNT: 13.7 % (ref 11.5–14.5)
EST. AVERAGE GLUCOSE BLD GHB EST-MCNC: 126 MG/DL
EST. AVERAGE GLUCOSE BLD GHB EST-MCNC: 134 MG/DL
FIO2 ON VENT: 21 %
FOLATE SERPL-MCNC: 9.1 NG/ML (ref 5–21)
GLOBULIN SER CALC-MCNC: 3.1 G/DL (ref 2–4)
GLOBULIN SER CALC-MCNC: 3.2 G/DL (ref 2–4)
GLOBULIN SER CALC-MCNC: 3.2 G/DL (ref 2–4)
GLOBULIN SER CALC-MCNC: 3.6 G/DL (ref 2–4)
GLOBULIN SER CALC-MCNC: 4 G/DL (ref 2–4)
GLOBULIN SER CALC-MCNC: 4.1 G/DL (ref 2–4)
GLUCOSE BLD STRIP.AUTO-MCNC: 102 MG/DL (ref 65–100)
GLUCOSE BLD STRIP.AUTO-MCNC: 106 MG/DL (ref 65–100)
GLUCOSE BLD STRIP.AUTO-MCNC: 106 MG/DL (ref 65–100)
GLUCOSE BLD STRIP.AUTO-MCNC: 108 MG/DL (ref 65–100)
GLUCOSE BLD STRIP.AUTO-MCNC: 108 MG/DL (ref 65–100)
GLUCOSE BLD STRIP.AUTO-MCNC: 112 MG/DL (ref 65–100)
GLUCOSE BLD STRIP.AUTO-MCNC: 113 MG/DL (ref 65–100)
GLUCOSE BLD STRIP.AUTO-MCNC: 117 MG/DL (ref 65–100)
GLUCOSE BLD STRIP.AUTO-MCNC: 117 MG/DL (ref 65–100)
GLUCOSE BLD STRIP.AUTO-MCNC: 118 MG/DL (ref 65–100)
GLUCOSE BLD STRIP.AUTO-MCNC: 135 MG/DL (ref 65–100)
GLUCOSE BLD STRIP.AUTO-MCNC: 145 MG/DL (ref 65–100)
GLUCOSE BLD STRIP.AUTO-MCNC: 149 MG/DL (ref 65–100)
GLUCOSE BLD STRIP.AUTO-MCNC: 87 MG/DL (ref 65–100)
GLUCOSE BLD STRIP.AUTO-MCNC: 99 MG/DL (ref 65–100)
GLUCOSE SERPL-MCNC: 100 MG/DL (ref 65–100)
GLUCOSE SERPL-MCNC: 104 MG/DL (ref 65–100)
GLUCOSE SERPL-MCNC: 104 MG/DL (ref 65–100)
GLUCOSE SERPL-MCNC: 107 MG/DL (ref 65–100)
GLUCOSE SERPL-MCNC: 115 MG/DL (ref 65–100)
GLUCOSE SERPL-MCNC: 118 MG/DL (ref 65–100)
GLUCOSE SERPL-MCNC: 120 MG/DL (ref 65–100)
GLUCOSE SERPL-MCNC: 140 MG/DL (ref 65–100)
GLUCOSE SERPL-MCNC: 152 MG/DL (ref 65–100)
GLUCOSE SERPL-MCNC: 187 MG/DL (ref 65–100)
GLUCOSE SERPL-MCNC: 190 MG/DL (ref 65–100)
GLUCOSE UR STRIP.AUTO-MCNC: NEGATIVE MG/DL
GLUCOSE UR STRIP.AUTO-MCNC: NEGATIVE MG/DL
GLUCOSE UR-MCNC: NEGATIVE MG/DL
HBA1C MFR BLD: 6 % (ref 4.2–6.3)
HBA1C MFR BLD: 6.3 % (ref 4.2–6.3)
HCO3 BLDV-SCNC: 20 MMOL/L (ref 23–28)
HCT VFR BLD AUTO: 30.2 % (ref 35–47)
HCT VFR BLD AUTO: 30.7 % (ref 35–47)
HCT VFR BLD AUTO: 31.8 % (ref 35–47)
HCT VFR BLD AUTO: 32.4 % (ref 35–47)
HCT VFR BLD AUTO: 33.6 % (ref 35–47)
HCT VFR BLD AUTO: 34.7 % (ref 35–47)
HCT VFR BLD AUTO: 34.8 % (ref 35–47)
HCT VFR BLD AUTO: 35.2 % (ref 35–47)
HCT VFR BLD AUTO: 35.3 % (ref 35–47)
HCT VFR BLD AUTO: 35.6 % (ref 35–47)
HCT VFR BLD AUTO: 36.6 % (ref 35–47)
HCT VFR BLD AUTO: 36.7 % (ref 35–47)
HCT VFR BLD AUTO: 36.8 % (ref 35–47)
HCT VFR BLD AUTO: 37.6 % (ref 35–47)
HCT VFR BLD AUTO: 39.2 % (ref 35–47)
HCT VFR BLD AUTO: 39.2 % (ref 35–47)
HCT VFR BLD AUTO: 43.3 % (ref 35–47)
HCYS SERPL-SCNC: 9.3 UMOL/L (ref 3.7–13.9)
HDLC SERPL-MCNC: 53 MG/DL
HDLC SERPL: 2.8 {RATIO} (ref 0–5)
HEMOCCULT STL QL: POSITIVE
HEMOCCULT STL QL: POSITIVE
HGB BLD-MCNC: 10 G/DL (ref 11.5–16)
HGB BLD-MCNC: 10.2 G/DL (ref 11.5–16)
HGB BLD-MCNC: 10.5 G/DL (ref 11.5–16)
HGB BLD-MCNC: 10.7 G/DL (ref 11.5–16)
HGB BLD-MCNC: 11.1 G/DL (ref 11.5–16)
HGB BLD-MCNC: 11.1 G/DL (ref 11.5–16)
HGB BLD-MCNC: 11.2 G/DL (ref 11.5–16)
HGB BLD-MCNC: 11.4 G/DL (ref 11.5–16)
HGB BLD-MCNC: 11.5 G/DL (ref 11.5–16)
HGB BLD-MCNC: 11.7 G/DL (ref 11.5–16)
HGB BLD-MCNC: 11.8 G/DL (ref 11.5–16)
HGB BLD-MCNC: 12 G/DL (ref 11.5–16)
HGB BLD-MCNC: 12 G/DL (ref 11.5–16)
HGB BLD-MCNC: 12.7 G/DL (ref 11.5–16)
HGB BLD-MCNC: 12.8 G/DL (ref 11.5–16)
HGB BLD-MCNC: 14.4 G/DL (ref 11.5–16)
HGB BLD-MCNC: 9.8 G/DL (ref 11.5–16)
HGB UR QL STRIP: ABNORMAL
HGB UR QL STRIP: NEGATIVE
IMM GRANULOCYTES # BLD: 0 K/UL
IMM GRANULOCYTES # BLD: 0 K/UL
IMM GRANULOCYTES # BLD: 0.1 K/UL (ref 0–0.04)
IMM GRANULOCYTES NFR BLD AUTO: 0 %
IMM GRANULOCYTES NFR BLD AUTO: 0 %
IMM GRANULOCYTES NFR BLD AUTO: 1 % (ref 0–0.5)
INR PPP: 1.2 (ref 0.9–1.1)
KETONES P FAST UR STRIP-MCNC: NEGATIVE MG/DL
KETONES UR QL STRIP.AUTO: ABNORMAL MG/DL
KETONES UR QL STRIP.AUTO: NEGATIVE MG/DL
LACTATE SERPL-SCNC: 1.1 MMOL/L (ref 0.4–2)
LACTATE SERPL-SCNC: 1.7 MMOL/L (ref 0.4–2)
LACTATE SERPL-SCNC: 1.9 MMOL/L (ref 0.4–2)
LACTATE SERPL-SCNC: 2.1 MMOL/L (ref 0.4–2)
LACTATE SERPL-SCNC: 2.5 MMOL/L (ref 0.4–2)
LACTATE SERPL-SCNC: 2.8 MMOL/L (ref 0.4–2)
LACTATE SERPL-SCNC: 3.1 MMOL/L (ref 0.4–2)
LDLC SERPL CALC-MCNC: 89.6 MG/DL (ref 0–100)
LEUKOCYTE ESTERASE UR QL STRIP.AUTO: ABNORMAL
LEUKOCYTE ESTERASE UR QL STRIP.AUTO: NEGATIVE
LIPASE SERPL-CCNC: 51 U/L (ref 73–393)
LIPID PROFILE,FLP: NORMAL
LYMPHOCYTES # BLD: 0.3 K/UL (ref 0.8–3.5)
LYMPHOCYTES # BLD: 0.9 K/UL (ref 0.8–3.5)
LYMPHOCYTES # BLD: 1 K/UL (ref 0.8–3.5)
LYMPHOCYTES # BLD: 1.2 K/UL (ref 0.8–3.5)
LYMPHOCYTES # BLD: 1.4 K/UL (ref 0.8–3.5)
LYMPHOCYTES # BLD: 1.6 K/UL (ref 0.8–3.5)
LYMPHOCYTES NFR BLD: 11 % (ref 12–49)
LYMPHOCYTES NFR BLD: 13 % (ref 12–49)
LYMPHOCYTES NFR BLD: 14 % (ref 12–49)
LYMPHOCYTES NFR BLD: 3 % (ref 12–49)
LYMPHOCYTES NFR BLD: 5 % (ref 12–49)
LYMPHOCYTES NFR BLD: 6 % (ref 12–49)
MAGNESIUM SERPL-MCNC: 1.5 MG/DL (ref 1.6–2.4)
MAGNESIUM SERPL-MCNC: 1.6 MG/DL (ref 1.6–2.4)
MAGNESIUM SERPL-MCNC: 1.8 MG/DL (ref 1.6–2.4)
MAGNESIUM SERPL-MCNC: 1.9 MG/DL (ref 1.6–2.4)
MAGNESIUM SERPL-MCNC: 1.9 MG/DL (ref 1.6–2.4)
MAGNESIUM SERPL-MCNC: 2 MG/DL (ref 1.6–2.4)
MAGNESIUM SERPL-MCNC: 2.2 MG/DL (ref 1.6–2.4)
MCH RBC QN AUTO: 28.5 PG (ref 26–34)
MCH RBC QN AUTO: 28.6 PG (ref 26–34)
MCH RBC QN AUTO: 28.6 PG (ref 26–34)
MCH RBC QN AUTO: 28.8 PG (ref 26–34)
MCH RBC QN AUTO: 28.9 PG (ref 26–34)
MCH RBC QN AUTO: 28.9 PG (ref 26–34)
MCH RBC QN AUTO: 29 PG (ref 26–34)
MCH RBC QN AUTO: 29 PG (ref 26–34)
MCH RBC QN AUTO: 29.1 PG (ref 26–34)
MCH RBC QN AUTO: 29.4 PG (ref 26–34)
MCH RBC QN AUTO: 29.5 PG (ref 26–34)
MCH RBC QN AUTO: 29.5 PG (ref 26–34)
MCH RBC QN AUTO: 29.6 PG (ref 26–34)
MCHC RBC AUTO-ENTMCNC: 31.4 G/DL (ref 30–36.5)
MCHC RBC AUTO-ENTMCNC: 31.5 G/DL (ref 30–36.5)
MCHC RBC AUTO-ENTMCNC: 31.8 G/DL (ref 30–36.5)
MCHC RBC AUTO-ENTMCNC: 31.9 G/DL (ref 30–36.5)
MCHC RBC AUTO-ENTMCNC: 32 G/DL (ref 30–36.5)
MCHC RBC AUTO-ENTMCNC: 32.1 G/DL (ref 30–36.5)
MCHC RBC AUTO-ENTMCNC: 32.2 G/DL (ref 30–36.5)
MCHC RBC AUTO-ENTMCNC: 32.7 G/DL (ref 30–36.5)
MCHC RBC AUTO-ENTMCNC: 32.8 G/DL (ref 30–36.5)
MCHC RBC AUTO-ENTMCNC: 33.1 G/DL (ref 30–36.5)
MCHC RBC AUTO-ENTMCNC: 33.3 G/DL (ref 30–36.5)
MCV RBC AUTO: 89.1 FL (ref 80–99)
MCV RBC AUTO: 89.1 FL (ref 80–99)
MCV RBC AUTO: 89.7 FL (ref 80–99)
MCV RBC AUTO: 90 FL (ref 80–99)
MCV RBC AUTO: 90.2 FL (ref 80–99)
MCV RBC AUTO: 90.2 FL (ref 80–99)
MCV RBC AUTO: 90.3 FL (ref 80–99)
MCV RBC AUTO: 90.5 FL (ref 80–99)
MCV RBC AUTO: 90.6 FL (ref 80–99)
MCV RBC AUTO: 90.6 FL (ref 80–99)
MCV RBC AUTO: 90.8 FL (ref 80–99)
MCV RBC AUTO: 91 FL (ref 80–99)
MCV RBC AUTO: 91.1 FL (ref 80–99)
METHYLMALONATE SERPL-SCNC: 58 NMOL/L (ref 0–378)
MONOCYTES # BLD: 0.3 K/UL (ref 0–1)
MONOCYTES # BLD: 0.6 K/UL (ref 0–1)
MONOCYTES # BLD: 1 K/UL (ref 0–1)
MONOCYTES # BLD: 1.2 K/UL (ref 0–1)
MONOCYTES NFR BLD: 3 % (ref 5–13)
MONOCYTES NFR BLD: 4 % (ref 5–13)
MONOCYTES NFR BLD: 6 % (ref 5–13)
MONOCYTES NFR BLD: 8 % (ref 5–13)
NEUTS BAND NFR BLD MANUAL: 4 % (ref 0–6)
NEUTS BAND NFR BLD MANUAL: 8 % (ref 0–6)
NEUTS SEG # BLD: 13.1 K/UL (ref 1.8–8)
NEUTS SEG # BLD: 16.9 K/UL (ref 1.8–8)
NEUTS SEG # BLD: 8.3 K/UL (ref 1.8–8)
NEUTS SEG # BLD: 8.5 K/UL (ref 1.8–8)
NEUTS SEG # BLD: 9.2 K/UL (ref 1.8–8)
NEUTS SEG # BLD: 9.9 K/UL (ref 1.8–8)
NEUTS SEG NFR BLD: 77 % (ref 32–75)
NEUTS SEG NFR BLD: 79 % (ref 32–75)
NEUTS SEG NFR BLD: 81 % (ref 32–75)
NEUTS SEG NFR BLD: 86 % (ref 32–75)
NEUTS SEG NFR BLD: 86 % (ref 32–75)
NEUTS SEG NFR BLD: 88 % (ref 32–75)
NITRITE UR QL STRIP.AUTO: NEGATIVE
NITRITE UR QL STRIP.AUTO: NEGATIVE
NRBC # BLD: 0 K/UL (ref 0–0.01)
NRBC BLD-RTO: 0 PER 100 WBC
P-R INTERVAL, ECG05: 142 MS
P-R INTERVAL, ECG05: 156 MS
PCO2 BLDV: 29 MMHG (ref 41–51)
PH BLDV: 7.45 [PH] (ref 7.32–7.42)
PH UR STRIP: 6 [PH] (ref 4.6–8)
PH UR STRIP: 6.5 [PH] (ref 5–8)
PH UR STRIP: 7.5 [PH] (ref 5–8)
PHOSPHATE SERPL-MCNC: 1.6 MG/DL (ref 2.6–4.7)
PHOSPHATE SERPL-MCNC: 3.1 MG/DL (ref 2.6–4.7)
PLATELET # BLD AUTO: 203 K/UL (ref 150–400)
PLATELET # BLD AUTO: 204 K/UL (ref 150–400)
PLATELET # BLD AUTO: 224 K/UL (ref 150–400)
PLATELET # BLD AUTO: 232 K/UL (ref 150–400)
PLATELET # BLD AUTO: 281 K/UL (ref 150–400)
PLATELET # BLD AUTO: 378 K/UL (ref 150–400)
PLATELET # BLD AUTO: 379 K/UL (ref 150–400)
PLATELET # BLD AUTO: 379 K/UL (ref 150–400)
PLATELET # BLD AUTO: 387 K/UL (ref 150–400)
PLATELET # BLD AUTO: 395 K/UL (ref 150–400)
PLATELET # BLD AUTO: 409 K/UL (ref 150–400)
PLATELET # BLD AUTO: 425 K/UL (ref 150–400)
PLATELET # BLD AUTO: 429 K/UL (ref 150–400)
PMV BLD AUTO: 10.6 FL (ref 8.9–12.9)
PMV BLD AUTO: 10.6 FL (ref 8.9–12.9)
PMV BLD AUTO: 10.9 FL (ref 8.9–12.9)
PMV BLD AUTO: 11 FL (ref 8.9–12.9)
PMV BLD AUTO: 11.2 FL (ref 8.9–12.9)
PMV BLD AUTO: 9.3 FL (ref 8.9–12.9)
PMV BLD AUTO: 9.5 FL (ref 8.9–12.9)
PMV BLD AUTO: 9.7 FL (ref 8.9–12.9)
PMV BLD AUTO: 9.8 FL (ref 8.9–12.9)
PMV BLD AUTO: 9.8 FL (ref 8.9–12.9)
PMV BLD AUTO: 9.9 FL (ref 8.9–12.9)
PO2 BLDV: 38 MMHG (ref 25–40)
POTASSIUM SERPL-SCNC: 3.3 MMOL/L (ref 3.5–5.1)
POTASSIUM SERPL-SCNC: 3.4 MMOL/L (ref 3.5–5.1)
POTASSIUM SERPL-SCNC: 3.6 MMOL/L (ref 3.5–5.1)
POTASSIUM SERPL-SCNC: 3.6 MMOL/L (ref 3.5–5.1)
POTASSIUM SERPL-SCNC: 3.7 MMOL/L (ref 3.5–5.1)
POTASSIUM SERPL-SCNC: 3.7 MMOL/L (ref 3.5–5.1)
POTASSIUM SERPL-SCNC: 4.1 MMOL/L (ref 3.5–5.1)
POTASSIUM SERPL-SCNC: 4.2 MMOL/L (ref 3.5–5.1)
POTASSIUM SERPL-SCNC: 4.3 MMOL/L (ref 3.5–5.1)
PROT SERPL-MCNC: 4.9 G/DL (ref 6.4–8.2)
PROT SERPL-MCNC: 5 G/DL (ref 6.4–8.2)
PROT SERPL-MCNC: 5.2 G/DL (ref 6.4–8.2)
PROT SERPL-MCNC: 6.4 G/DL (ref 6.4–8.2)
PROT SERPL-MCNC: 6.5 G/DL (ref 6.4–8.2)
PROT SERPL-MCNC: 7.7 G/DL (ref 6.4–8.2)
PROT UR QL STRIP: NORMAL
PROT UR STRIP-MCNC: NEGATIVE MG/DL
PROT UR STRIP-MCNC: NEGATIVE MG/DL
PROTHROMBIN TIME: 12.5 SEC (ref 9–11.1)
Q-T INTERVAL, ECG07: 332 MS
Q-T INTERVAL, ECG07: 354 MS
Q-T INTERVAL, ECG07: 386 MS
QRS DURATION, ECG06: 68 MS
QRS DURATION, ECG06: 74 MS
QRS DURATION, ECG06: 78 MS
QTC CALCULATION (BEZET), ECG08: 419 MS
QTC CALCULATION (BEZET), ECG08: 451 MS
QTC CALCULATION (BEZET), ECG08: 456 MS
RBC # BLD AUTO: 3.39 M/UL (ref 3.8–5.2)
RBC # BLD AUTO: 3.39 M/UL (ref 3.8–5.2)
RBC # BLD AUTO: 3.58 M/UL (ref 3.8–5.2)
RBC # BLD AUTO: 3.69 M/UL (ref 3.8–5.2)
RBC # BLD AUTO: 3.82 M/UL (ref 3.8–5.2)
RBC # BLD AUTO: 3.88 M/UL (ref 3.8–5.2)
RBC # BLD AUTO: 3.88 M/UL (ref 3.8–5.2)
RBC # BLD AUTO: 3.91 M/UL (ref 3.8–5.2)
RBC # BLD AUTO: 4.06 M/UL (ref 3.8–5.2)
RBC # BLD AUTO: 4.07 M/UL (ref 3.8–5.2)
RBC # BLD AUTO: 4.08 M/UL (ref 3.8–5.2)
RBC # BLD AUTO: 4.34 M/UL (ref 3.8–5.2)
RBC # BLD AUTO: 4.86 M/UL (ref 3.8–5.2)
RBC #/AREA URNS HPF: ABNORMAL /HPF (ref 0–5)
RBC #/AREA URNS HPF: NORMAL /HPF (ref 0–5)
RBC MORPH BLD: ABNORMAL
RBC MORPH BLD: ABNORMAL
SAO2 % BLDV: 76 % (ref 65–88)
SAO2% DEVICE SAO2% SENSOR NAME: ABNORMAL
SERVICE CMNT-IMP: ABNORMAL
SERVICE CMNT-IMP: NORMAL
SODIUM SERPL-SCNC: 133 MMOL/L (ref 136–145)
SODIUM SERPL-SCNC: 133 MMOL/L (ref 136–145)
SODIUM SERPL-SCNC: 134 MMOL/L (ref 136–145)
SODIUM SERPL-SCNC: 135 MMOL/L (ref 136–145)
SODIUM SERPL-SCNC: 136 MMOL/L (ref 136–145)
SP GR UR REFRACTOMETRY: 1.01 (ref 1–1.03)
SP GR UR REFRACTOMETRY: 1.01 (ref 1–1.03)
SP GR UR STRIP: 1.02 (ref 1–1.03)
SPECIMEN EXP DATE BLD: NORMAL
SPECIMEN SITE: ABNORMAL
THERAPEUTIC RANGE,PTTT: NORMAL SECS (ref 58–77)
TRIGL SERPL-MCNC: 37 MG/DL (ref ?–150)
TROPONIN I SERPL-MCNC: <0.04 NG/ML
TSH SERPL DL<=0.05 MIU/L-ACNC: 3.53 UIU/ML (ref 0.36–3.74)
UA UROBILINOGEN AMB POC: NORMAL (ref 0.2–1)
UR CULT HOLD, URHOLD: NORMAL
URINALYSIS CLARITY POC: NORMAL
URINALYSIS COLOR POC: YELLOW
URINE BLOOD POC: NORMAL
URINE LEUKOCYTES POC: NORMAL
URINE NITRITES POC: POSITIVE
UROBILINOGEN UR QL STRIP.AUTO: 0.2 EU/DL (ref 0.2–1)
UROBILINOGEN UR QL STRIP.AUTO: 0.2 EU/DL (ref 0.2–1)
VENTRICULAR RATE, ECG03: 84 BPM
VENTRICULAR RATE, ECG03: 96 BPM
VENTRICULAR RATE, ECG03: 98 BPM
VIT B12 SERPL-MCNC: 281 PG/ML (ref 193–986)
VLDLC SERPL CALC-MCNC: 7.4 MG/DL
WBC # BLD AUTO: 10.4 K/UL (ref 3.6–11)
WBC # BLD AUTO: 10.5 K/UL (ref 3.6–11)
WBC # BLD AUTO: 10.5 K/UL (ref 3.6–11)
WBC # BLD AUTO: 12 K/UL (ref 3.6–11)
WBC # BLD AUTO: 14.6 K/UL (ref 3.6–11)
WBC # BLD AUTO: 18.2 K/UL (ref 3.6–11)
WBC # BLD AUTO: 19.2 K/UL (ref 3.6–11)
WBC # BLD AUTO: 6.7 K/UL (ref 3.6–11)
WBC # BLD AUTO: 7.1 K/UL (ref 3.6–11)
WBC # BLD AUTO: 7.3 K/UL (ref 3.6–11)
WBC # BLD AUTO: 7.7 K/UL (ref 3.6–11)
WBC # BLD AUTO: 8 K/UL (ref 3.6–11)
WBC # BLD AUTO: 9.2 K/UL (ref 3.6–11)
WBC URNS QL MICRO: ABNORMAL /HPF (ref 0–4)
WBC URNS QL MICRO: NORMAL /HPF (ref 0–4)
YEAST URNS QL MICRO: PRESENT

## 2018-01-01 PROCEDURE — 97163 PT EVAL HIGH COMPLEX 45 MIN: CPT

## 2018-01-01 PROCEDURE — 65660000000 HC RM CCU STEPDOWN

## 2018-01-01 PROCEDURE — 76210000016 HC OR PH I REC 1 TO 1.5 HR: Performed by: SURGERY

## 2018-01-01 PROCEDURE — 0DTJ4ZZ RESECTION OF APPENDIX, PERCUTANEOUS ENDOSCOPIC APPROACH: ICD-10-PCS | Performed by: SURGERY

## 2018-01-01 PROCEDURE — 74011250636 HC RX REV CODE- 250/636: Performed by: EMERGENCY MEDICINE

## 2018-01-01 PROCEDURE — 77030035048 HC TRCR ENDOSC OPTCL COVD -B: Performed by: SURGERY

## 2018-01-01 PROCEDURE — 36415 COLL VENOUS BLD VENIPUNCTURE: CPT | Performed by: PHYSICIAN ASSISTANT

## 2018-01-01 PROCEDURE — 93005 ELECTROCARDIOGRAM TRACING: CPT

## 2018-01-01 PROCEDURE — 74011250636 HC RX REV CODE- 250/636: Performed by: STUDENT IN AN ORGANIZED HEALTH CARE EDUCATION/TRAINING PROGRAM

## 2018-01-01 PROCEDURE — 87040 BLOOD CULTURE FOR BACTERIA: CPT | Performed by: EMERGENCY MEDICINE

## 2018-01-01 PROCEDURE — 74011250636 HC RX REV CODE- 250/636: Performed by: PHYSICIAN ASSISTANT

## 2018-01-01 PROCEDURE — 77030002933 HC SUT MCRYL J&J -A: Performed by: SURGERY

## 2018-01-01 PROCEDURE — C1751 CATH, INF, PER/CENT/MIDLINE: HCPCS

## 2018-01-01 PROCEDURE — 87088 URINE BACTERIA CULTURE: CPT

## 2018-01-01 PROCEDURE — 97112 NEUROMUSCULAR REEDUCATION: CPT | Performed by: PHYSICAL THERAPIST

## 2018-01-01 PROCEDURE — 77030013567 HC DRN WND RESERV BARD -A: Performed by: SURGERY

## 2018-01-01 PROCEDURE — 82607 VITAMIN B-12: CPT | Performed by: PSYCHIATRY & NEUROLOGY

## 2018-01-01 PROCEDURE — 97161 PT EVAL LOW COMPLEX 20 MIN: CPT

## 2018-01-01 PROCEDURE — G8979 MOBILITY GOAL STATUS: HCPCS | Performed by: PHYSICAL THERAPIST

## 2018-01-01 PROCEDURE — 97162 PT EVAL MOD COMPLEX 30 MIN: CPT | Performed by: PHYSICAL THERAPIST

## 2018-01-01 PROCEDURE — 74011000250 HC RX REV CODE- 250: Performed by: PHYSICIAN ASSISTANT

## 2018-01-01 PROCEDURE — 76450000000

## 2018-01-01 PROCEDURE — 80053 COMPREHEN METABOLIC PANEL: CPT

## 2018-01-01 PROCEDURE — 87186 SC STD MICRODIL/AGAR DIL: CPT

## 2018-01-01 PROCEDURE — 77030038269 HC DRN EXT URIN PURWCK BARD -A

## 2018-01-01 PROCEDURE — 74011250636 HC RX REV CODE- 250/636: Performed by: SURGERY

## 2018-01-01 PROCEDURE — 74011250637 HC RX REV CODE- 250/637: Performed by: PHYSICIAN ASSISTANT

## 2018-01-01 PROCEDURE — 74011250637 HC RX REV CODE- 250/637: Performed by: SURGERY

## 2018-01-01 PROCEDURE — 77030011943

## 2018-01-01 PROCEDURE — 99285 EMERGENCY DEPT VISIT HI MDM: CPT

## 2018-01-01 PROCEDURE — 74011000250 HC RX REV CODE- 250: Performed by: SURGERY

## 2018-01-01 PROCEDURE — 80061 LIPID PANEL: CPT

## 2018-01-01 PROCEDURE — 83605 ASSAY OF LACTIC ACID: CPT | Performed by: SURGERY

## 2018-01-01 PROCEDURE — 85027 COMPLETE CBC AUTOMATED: CPT | Performed by: PHYSICIAN ASSISTANT

## 2018-01-01 PROCEDURE — 87106 FUNGI IDENTIFICATION YEAST: CPT | Performed by: PHYSICIAN ASSISTANT

## 2018-01-01 PROCEDURE — 74011636320 HC RX REV CODE- 636/320: Performed by: RADIOLOGY

## 2018-01-01 PROCEDURE — 76060000033 HC ANESTHESIA 1 TO 1.5 HR: Performed by: SURGERY

## 2018-01-01 PROCEDURE — 85027 COMPLETE CBC AUTOMATED: CPT | Performed by: SURGERY

## 2018-01-01 PROCEDURE — 74011000250 HC RX REV CODE- 250: Performed by: STUDENT IN AN ORGANIZED HEALTH CARE EDUCATION/TRAINING PROGRAM

## 2018-01-01 PROCEDURE — 84100 ASSAY OF PHOSPHORUS: CPT | Performed by: FAMILY MEDICINE

## 2018-01-01 PROCEDURE — 74177 CT ABD & PELVIS W/CONTRAST: CPT

## 2018-01-01 PROCEDURE — 85025 COMPLETE CBC W/AUTO DIFF WBC: CPT | Performed by: PHYSICIAN ASSISTANT

## 2018-01-01 PROCEDURE — 97116 GAIT TRAINING THERAPY: CPT

## 2018-01-01 PROCEDURE — 83735 ASSAY OF MAGNESIUM: CPT | Performed by: PHYSICIAN ASSISTANT

## 2018-01-01 PROCEDURE — C9113 INJ PANTOPRAZOLE SODIUM, VIA: HCPCS | Performed by: STUDENT IN AN ORGANIZED HEALTH CARE EDUCATION/TRAINING PROGRAM

## 2018-01-01 PROCEDURE — 77030032490 HC SLV COMPR SCD KNE COVD -B

## 2018-01-01 PROCEDURE — 77030011244 HC DRN WND HUBLS J&J -B: Performed by: SURGERY

## 2018-01-01 PROCEDURE — 77010033678 HC OXYGEN DAILY

## 2018-01-01 PROCEDURE — 83605 ASSAY OF LACTIC ACID: CPT | Performed by: FAMILY MEDICINE

## 2018-01-01 PROCEDURE — 82962 GLUCOSE BLOOD TEST: CPT

## 2018-01-01 PROCEDURE — 80053 COMPREHEN METABOLIC PANEL: CPT | Performed by: SURGERY

## 2018-01-01 PROCEDURE — 74011636637 HC RX REV CODE- 636/637: Performed by: INTERNAL MEDICINE

## 2018-01-01 PROCEDURE — 85018 HEMOGLOBIN: CPT | Performed by: FAMILY MEDICINE

## 2018-01-01 PROCEDURE — 77030002966 HC SUT PDS J&J -A: Performed by: SURGERY

## 2018-01-01 PROCEDURE — 51798 US URINE CAPACITY MEASURE: CPT

## 2018-01-01 PROCEDURE — 87077 CULTURE AEROBIC IDENTIFY: CPT

## 2018-01-01 PROCEDURE — 85025 COMPLETE CBC W/AUTO DIFF WBC: CPT | Performed by: SURGERY

## 2018-01-01 PROCEDURE — 77030032490 HC SLV COMPR SCD KNE COVD -B: Performed by: SURGERY

## 2018-01-01 PROCEDURE — 77030039266 HC ADH SKN EXOFIN S2SG -A: Performed by: SURGERY

## 2018-01-01 PROCEDURE — 83921 ORGANIC ACID SINGLE QUANT: CPT | Performed by: PSYCHIATRY & NEUROLOGY

## 2018-01-01 PROCEDURE — 74011250636 HC RX REV CODE- 250/636: Performed by: FAMILY MEDICINE

## 2018-01-01 PROCEDURE — 74011000258 HC RX REV CODE- 258: Performed by: INTERNAL MEDICINE

## 2018-01-01 PROCEDURE — 74011000258 HC RX REV CODE- 258: Performed by: SURGERY

## 2018-01-01 PROCEDURE — G8978 MOBILITY CURRENT STATUS: HCPCS | Performed by: PHYSICAL THERAPIST

## 2018-01-01 PROCEDURE — 77030018719 HC DRSG PTCH ANTIMIC J&J -A

## 2018-01-01 PROCEDURE — 71045 X-RAY EXAM CHEST 1 VIEW: CPT

## 2018-01-01 PROCEDURE — 82746 ASSAY OF FOLIC ACID SERUM: CPT | Performed by: PSYCHIATRY & NEUROLOGY

## 2018-01-01 PROCEDURE — 87086 URINE CULTURE/COLONY COUNT: CPT | Performed by: PHYSICIAN ASSISTANT

## 2018-01-01 PROCEDURE — 83036 HEMOGLOBIN GLYCOSYLATED A1C: CPT | Performed by: INTERNAL MEDICINE

## 2018-01-01 PROCEDURE — 77030020747 HC TU INSUF ENDOSC TELE -A: Performed by: SURGERY

## 2018-01-01 PROCEDURE — 85018 HEMOGLOBIN: CPT

## 2018-01-01 PROCEDURE — 83090 ASSAY OF HOMOCYSTEINE: CPT | Performed by: PSYCHIATRY & NEUROLOGY

## 2018-01-01 PROCEDURE — 76937 US GUIDE VASCULAR ACCESS: CPT

## 2018-01-01 PROCEDURE — 96375 TX/PRO/DX INJ NEW DRUG ADDON: CPT

## 2018-01-01 PROCEDURE — 77030034850

## 2018-01-01 PROCEDURE — 96365 THER/PROPH/DIAG IV INF INIT: CPT

## 2018-01-01 PROCEDURE — 80053 COMPREHEN METABOLIC PANEL: CPT | Performed by: EMERGENCY MEDICINE

## 2018-01-01 PROCEDURE — 83735 ASSAY OF MAGNESIUM: CPT | Performed by: SURGERY

## 2018-01-01 PROCEDURE — 77030022952 HC TRCR ENDOSC COVD -C: Performed by: SURGERY

## 2018-01-01 PROCEDURE — 80053 COMPREHEN METABOLIC PANEL: CPT | Performed by: FAMILY MEDICINE

## 2018-01-01 PROCEDURE — 97165 OT EVAL LOW COMPLEX 30 MIN: CPT

## 2018-01-01 PROCEDURE — 74011250637 HC RX REV CODE- 250/637: Performed by: NURSE PRACTITIONER

## 2018-01-01 PROCEDURE — 74011000250 HC RX REV CODE- 250

## 2018-01-01 PROCEDURE — 83735 ASSAY OF MAGNESIUM: CPT | Performed by: FAMILY MEDICINE

## 2018-01-01 PROCEDURE — 77030005563 HC CATH URETH INT MMGH -A

## 2018-01-01 PROCEDURE — 83690 ASSAY OF LIPASE: CPT | Performed by: EMERGENCY MEDICINE

## 2018-01-01 PROCEDURE — 74011250636 HC RX REV CODE- 250/636

## 2018-01-01 PROCEDURE — 77030022474 HC RELD STPLR ENDO GIA COVD -C: Performed by: SURGERY

## 2018-01-01 PROCEDURE — 65270000029 HC RM PRIVATE

## 2018-01-01 PROCEDURE — 65610000006 HC RM INTENSIVE CARE

## 2018-01-01 PROCEDURE — 84484 ASSAY OF TROPONIN QUANT: CPT

## 2018-01-01 PROCEDURE — 85018 HEMOGLOBIN: CPT | Performed by: INTERNAL MEDICINE

## 2018-01-01 PROCEDURE — 87086 URINE CULTURE/COLONY COUNT: CPT

## 2018-01-01 PROCEDURE — 77030013079 HC BLNKT BAIR HGGR 3M -A: Performed by: ANESTHESIOLOGY

## 2018-01-01 PROCEDURE — 36569 INSJ PICC 5 YR+ W/O IMAGING: CPT | Performed by: PHYSICIAN ASSISTANT

## 2018-01-01 PROCEDURE — 85025 COMPLETE CBC W/AUTO DIFF WBC: CPT | Performed by: EMERGENCY MEDICINE

## 2018-01-01 PROCEDURE — 97530 THERAPEUTIC ACTIVITIES: CPT

## 2018-01-01 PROCEDURE — 80048 BASIC METABOLIC PNL TOTAL CA: CPT | Performed by: PHYSICIAN ASSISTANT

## 2018-01-01 PROCEDURE — 74011250636 HC RX REV CODE- 250/636: Performed by: INTERNAL MEDICINE

## 2018-01-01 PROCEDURE — 83735 ASSAY OF MAGNESIUM: CPT

## 2018-01-01 PROCEDURE — 77030018786 HC NDL GD F/USND BARD -B

## 2018-01-01 PROCEDURE — 77030026438 HC STYL ET INTUB CARD -A: Performed by: ANESTHESIOLOGY

## 2018-01-01 PROCEDURE — 97535 SELF CARE MNGMENT TRAINING: CPT

## 2018-01-01 PROCEDURE — 84484 ASSAY OF TROPONIN QUANT: CPT | Performed by: PHYSICIAN ASSISTANT

## 2018-01-01 PROCEDURE — 51702 INSERT TEMP BLADDER CATH: CPT

## 2018-01-01 PROCEDURE — 96361 HYDRATE IV INFUSION ADD-ON: CPT

## 2018-01-01 PROCEDURE — 85730 THROMBOPLASTIN TIME PARTIAL: CPT | Performed by: SURGERY

## 2018-01-01 PROCEDURE — 85018 HEMOGLOBIN: CPT | Performed by: SURGERY

## 2018-01-01 PROCEDURE — 74011000258 HC RX REV CODE- 258: Performed by: PHYSICIAN ASSISTANT

## 2018-01-01 PROCEDURE — 88304 TISSUE EXAM BY PATHOLOGIST: CPT | Performed by: SURGERY

## 2018-01-01 PROCEDURE — 74011000258 HC RX REV CODE- 258: Performed by: STUDENT IN AN ORGANIZED HEALTH CARE EDUCATION/TRAINING PROGRAM

## 2018-01-01 PROCEDURE — 83605 ASSAY OF LACTIC ACID: CPT | Performed by: EMERGENCY MEDICINE

## 2018-01-01 PROCEDURE — 36415 COLL VENOUS BLD VENIPUNCTURE: CPT | Performed by: EMERGENCY MEDICINE

## 2018-01-01 PROCEDURE — 74011000258 HC RX REV CODE- 258: Performed by: EMERGENCY MEDICINE

## 2018-01-01 PROCEDURE — 82272 OCCULT BLD FECES 1-3 TESTS: CPT | Performed by: PHYSICIAN ASSISTANT

## 2018-01-01 PROCEDURE — 77030035051: Performed by: SURGERY

## 2018-01-01 PROCEDURE — 02HV33Z INSERTION OF INFUSION DEVICE INTO SUPERIOR VENA CAVA, PERCUTANEOUS APPROACH: ICD-10-PCS | Performed by: SURGERY

## 2018-01-01 PROCEDURE — 74011250637 HC RX REV CODE- 250/637: Performed by: STUDENT IN AN ORGANIZED HEALTH CARE EDUCATION/TRAINING PROGRAM

## 2018-01-01 PROCEDURE — 82272 OCCULT BLD FECES 1-3 TESTS: CPT

## 2018-01-01 PROCEDURE — 76010000149 HC OR TIME 1 TO 1.5 HR: Performed by: SURGERY

## 2018-01-01 PROCEDURE — 77030007955 HC PCH ENDOSC SPEC J&J -B: Performed by: SURGERY

## 2018-01-01 PROCEDURE — 77030011640 HC PAD GRND REM COVD -A: Performed by: SURGERY

## 2018-01-01 PROCEDURE — 84100 ASSAY OF PHOSPHORUS: CPT | Performed by: STUDENT IN AN ORGANIZED HEALTH CARE EDUCATION/TRAINING PROGRAM

## 2018-01-01 PROCEDURE — 83036 HEMOGLOBIN GLYCOSYLATED A1C: CPT

## 2018-01-01 PROCEDURE — 77030018836 HC SOL IRR NACL ICUM -A: Performed by: SURGERY

## 2018-01-01 PROCEDURE — 77030020186 HC BOOT HL PROTCT SAGE -B

## 2018-01-01 PROCEDURE — 85025 COMPLETE CBC W/AUTO DIFF WBC: CPT

## 2018-01-01 PROCEDURE — 77030002916 HC SUT ETHLN J&J -A: Performed by: SURGERY

## 2018-01-01 PROCEDURE — 86901 BLOOD TYPING SEROLOGIC RH(D): CPT

## 2018-01-01 PROCEDURE — 97110 THERAPEUTIC EXERCISES: CPT | Performed by: PHYSICAL THERAPIST

## 2018-01-01 PROCEDURE — 83605 ASSAY OF LACTIC ACID: CPT | Performed by: PHYSICIAN ASSISTANT

## 2018-01-01 PROCEDURE — 81001 URINALYSIS AUTO W/SCOPE: CPT | Performed by: PHYSICIAN ASSISTANT

## 2018-01-01 PROCEDURE — 83605 ASSAY OF LACTIC ACID: CPT

## 2018-01-01 PROCEDURE — 77030008684 HC TU ET CUF COVD -B: Performed by: ANESTHESIOLOGY

## 2018-01-01 PROCEDURE — 93306 TTE W/DOPPLER COMPLETE: CPT

## 2018-01-01 PROCEDURE — 85610 PROTHROMBIN TIME: CPT | Performed by: SURGERY

## 2018-01-01 PROCEDURE — 36415 COLL VENOUS BLD VENIPUNCTURE: CPT | Performed by: SURGERY

## 2018-01-01 PROCEDURE — 51701 INSERT BLADDER CATHETER: CPT

## 2018-01-01 PROCEDURE — 77030022473 HC HNDL ENDO GIA UNIV USDA -C: Performed by: SURGERY

## 2018-01-01 PROCEDURE — 84443 ASSAY THYROID STIM HORMONE: CPT

## 2018-01-01 PROCEDURE — 80053 COMPREHEN METABOLIC PANEL: CPT | Performed by: PHYSICIAN ASSISTANT

## 2018-01-01 PROCEDURE — 36415 COLL VENOUS BLD VENIPUNCTURE: CPT

## 2018-01-01 PROCEDURE — 82803 BLOOD GASES ANY COMBINATION: CPT

## 2018-01-01 PROCEDURE — 36415 COLL VENOUS BLD VENIPUNCTURE: CPT | Performed by: FAMILY MEDICINE

## 2018-01-01 PROCEDURE — 76705 ECHO EXAM OF ABDOMEN: CPT

## 2018-01-01 PROCEDURE — 83880 ASSAY OF NATRIURETIC PEPTIDE: CPT

## 2018-01-01 PROCEDURE — 81001 URINALYSIS AUTO W/SCOPE: CPT | Performed by: EMERGENCY MEDICINE

## 2018-01-01 PROCEDURE — 84484 ASSAY OF TROPONIN QUANT: CPT | Performed by: FAMILY MEDICINE

## 2018-01-01 RX ORDER — SODIUM CHLORIDE 0.9 % (FLUSH) 0.9 %
5-10 SYRINGE (ML) INJECTION AS NEEDED
Status: DISCONTINUED | OUTPATIENT
Start: 2018-01-01 | End: 2018-01-01 | Stop reason: HOSPADM

## 2018-01-01 RX ORDER — KETOROLAC TROMETHAMINE 30 MG/ML
15 INJECTION, SOLUTION INTRAMUSCULAR; INTRAVENOUS EVERY 6 HOURS
Status: COMPLETED | OUTPATIENT
Start: 2018-01-01 | End: 2018-01-01

## 2018-01-01 RX ORDER — BUSPIRONE HYDROCHLORIDE 5 MG/1
5 TABLET ORAL
COMMUNITY

## 2018-01-01 RX ORDER — MORPHINE SULFATE 4 MG/ML
4 INJECTION INTRAVENOUS
Status: COMPLETED | OUTPATIENT
Start: 2018-01-01 | End: 2018-01-01

## 2018-01-01 RX ORDER — HYDROMORPHONE HYDROCHLORIDE 2 MG/ML
.25-1 INJECTION, SOLUTION INTRAMUSCULAR; INTRAVENOUS; SUBCUTANEOUS
Status: DISCONTINUED | OUTPATIENT
Start: 2018-01-01 | End: 2018-01-01 | Stop reason: HOSPADM

## 2018-01-01 RX ORDER — CARVEDILOL 3.12 MG/1
3.12 TABLET ORAL 2 TIMES DAILY WITH MEALS
Status: CANCELLED | OUTPATIENT
Start: 2018-01-01

## 2018-01-01 RX ORDER — LEVOFLOXACIN 750 MG/1
750 TABLET ORAL DAILY
Qty: 9 TAB | Refills: 0 | Status: SHIPPED | OUTPATIENT
Start: 2018-01-01 | End: 2018-01-01

## 2018-01-01 RX ORDER — SODIUM CHLORIDE, SODIUM LACTATE, POTASSIUM CHLORIDE, CALCIUM CHLORIDE 600; 310; 30; 20 MG/100ML; MG/100ML; MG/100ML; MG/100ML
INJECTION, SOLUTION INTRAVENOUS
Status: DISCONTINUED | OUTPATIENT
Start: 2018-01-01 | End: 2018-01-01 | Stop reason: HOSPADM

## 2018-01-01 RX ORDER — ENOXAPARIN SODIUM 100 MG/ML
40 INJECTION SUBCUTANEOUS EVERY 24 HOURS
Status: DISCONTINUED | OUTPATIENT
Start: 2018-01-01 | End: 2018-01-01

## 2018-01-01 RX ORDER — DEXAMETHASONE SODIUM PHOSPHATE 4 MG/ML
INJECTION, SOLUTION INTRA-ARTICULAR; INTRALESIONAL; INTRAMUSCULAR; INTRAVENOUS; SOFT TISSUE AS NEEDED
Status: DISCONTINUED | OUTPATIENT
Start: 2018-01-01 | End: 2018-01-01 | Stop reason: HOSPADM

## 2018-01-01 RX ORDER — DOCUSATE SODIUM 100 MG/1
100 CAPSULE, LIQUID FILLED ORAL
Qty: 30 CAP | Refills: 2 | Status: SHIPPED | OUTPATIENT
Start: 2018-01-01 | End: 2018-08-01

## 2018-01-01 RX ORDER — MAGNESIUM SULFATE 100 %
4 CRYSTALS MISCELLANEOUS AS NEEDED
Status: DISCONTINUED | OUTPATIENT
Start: 2018-01-01 | End: 2018-01-01 | Stop reason: HOSPADM

## 2018-01-01 RX ORDER — METRONIDAZOLE 500 MG/100ML
500 INJECTION, SOLUTION INTRAVENOUS EVERY 8 HOURS
Status: DISCONTINUED | OUTPATIENT
Start: 2018-01-01 | End: 2018-01-01

## 2018-01-01 RX ORDER — SODIUM CHLORIDE 0.9 % (FLUSH) 0.9 %
5-10 SYRINGE (ML) INJECTION EVERY 8 HOURS
Status: DISCONTINUED | OUTPATIENT
Start: 2018-01-01 | End: 2018-01-01 | Stop reason: HOSPADM

## 2018-01-01 RX ORDER — QUETIAPINE FUMARATE 25 MG/1
25 TABLET, FILM COATED ORAL
COMMUNITY

## 2018-01-01 RX ORDER — DONEPEZIL HYDROCHLORIDE 5 MG/1
5 TABLET, FILM COATED ORAL
Status: DISCONTINUED | OUTPATIENT
Start: 2018-01-01 | End: 2018-01-01

## 2018-01-01 RX ORDER — SODIUM CHLORIDE, SODIUM LACTATE, POTASSIUM CHLORIDE, CALCIUM CHLORIDE 600; 310; 30; 20 MG/100ML; MG/100ML; MG/100ML; MG/100ML
100 INJECTION, SOLUTION INTRAVENOUS CONTINUOUS
Status: CANCELLED | OUTPATIENT
Start: 2018-01-01 | End: 2018-01-01

## 2018-01-01 RX ORDER — ONDANSETRON 2 MG/ML
INJECTION INTRAMUSCULAR; INTRAVENOUS AS NEEDED
Status: DISCONTINUED | OUTPATIENT
Start: 2018-01-01 | End: 2018-01-01 | Stop reason: HOSPADM

## 2018-01-01 RX ORDER — NALOXONE HYDROCHLORIDE 0.4 MG/ML
0.4 INJECTION, SOLUTION INTRAMUSCULAR; INTRAVENOUS; SUBCUTANEOUS AS NEEDED
Status: DISCONTINUED | OUTPATIENT
Start: 2018-01-01 | End: 2018-01-01 | Stop reason: HOSPADM

## 2018-01-01 RX ORDER — SODIUM CHLORIDE 0.9 % (FLUSH) 0.9 %
5-10 SYRINGE (ML) INJECTION AS NEEDED
Status: CANCELLED | OUTPATIENT
Start: 2018-01-01

## 2018-01-01 RX ORDER — ONDANSETRON 2 MG/ML
8 INJECTION INTRAMUSCULAR; INTRAVENOUS
Status: COMPLETED | OUTPATIENT
Start: 2018-01-01 | End: 2018-01-01

## 2018-01-01 RX ORDER — QUETIAPINE FUMARATE 25 MG/1
25 TABLET, FILM COATED ORAL
Status: DISCONTINUED | OUTPATIENT
Start: 2018-01-01 | End: 2018-01-01 | Stop reason: HOSPADM

## 2018-01-01 RX ORDER — DEXTROSE 50 % IN WATER (D50W) INTRAVENOUS SYRINGE
12.5-25 AS NEEDED
Status: DISCONTINUED | OUTPATIENT
Start: 2018-01-01 | End: 2018-01-01 | Stop reason: HOSPADM

## 2018-01-01 RX ORDER — DOCUSATE SODIUM 100 MG/1
100 CAPSULE, LIQUID FILLED ORAL 2 TIMES DAILY
Status: DISCONTINUED | OUTPATIENT
Start: 2018-01-01 | End: 2018-01-01 | Stop reason: HOSPADM

## 2018-01-01 RX ORDER — METRONIDAZOLE 500 MG/100ML
500 INJECTION, SOLUTION INTRAVENOUS
Status: COMPLETED | OUTPATIENT
Start: 2018-01-01 | End: 2018-01-01

## 2018-01-01 RX ORDER — INSULIN LISPRO 100 [IU]/ML
INJECTION, SOLUTION INTRAVENOUS; SUBCUTANEOUS EVERY 6 HOURS
Status: DISCONTINUED | OUTPATIENT
Start: 2018-01-01 | End: 2018-01-01

## 2018-01-01 RX ORDER — METRONIDAZOLE 500 MG/1
500 TABLET ORAL 3 TIMES DAILY
Qty: 27 TAB | Refills: 0 | Status: SHIPPED | OUTPATIENT
Start: 2018-01-01 | End: 2018-01-01

## 2018-01-01 RX ORDER — OXYCODONE HYDROCHLORIDE 5 MG/1
5 TABLET ORAL
Qty: 15 TAB | Refills: 0 | Status: ON HOLD | OUTPATIENT
Start: 2018-01-01 | End: 2018-01-01

## 2018-01-01 RX ORDER — LIDOCAINE HYDROCHLORIDE 10 MG/ML
0.1 INJECTION, SOLUTION EPIDURAL; INFILTRATION; INTRACAUDAL; PERINEURAL AS NEEDED
Status: CANCELLED | OUTPATIENT
Start: 2018-01-01

## 2018-01-01 RX ORDER — ESMOLOL HYDROCHLORIDE 10 MG/ML
INJECTION INTRAVENOUS AS NEEDED
Status: DISCONTINUED | OUTPATIENT
Start: 2018-01-01 | End: 2018-01-01 | Stop reason: HOSPADM

## 2018-01-01 RX ORDER — LIDOCAINE HYDROCHLORIDE 20 MG/ML
INJECTION, SOLUTION EPIDURAL; INFILTRATION; INTRACAUDAL; PERINEURAL AS NEEDED
Status: DISCONTINUED | OUTPATIENT
Start: 2018-01-01 | End: 2018-01-01 | Stop reason: HOSPADM

## 2018-01-01 RX ORDER — FUROSEMIDE 20 MG/1
20 TABLET ORAL DAILY
COMMUNITY
End: 2018-01-01

## 2018-01-01 RX ORDER — LEVOFLOXACIN 5 MG/ML
750 INJECTION, SOLUTION INTRAVENOUS
Status: DISCONTINUED | OUTPATIENT
Start: 2018-01-01 | End: 2018-01-01

## 2018-01-01 RX ORDER — HYDROCODONE BITARTRATE AND ACETAMINOPHEN 5; 325 MG/1; MG/1
1 TABLET ORAL
Status: DISCONTINUED | OUTPATIENT
Start: 2018-01-01 | End: 2018-01-01

## 2018-01-01 RX ORDER — PROPOFOL 10 MG/ML
INJECTION, EMULSION INTRAVENOUS AS NEEDED
Status: DISCONTINUED | OUTPATIENT
Start: 2018-01-01 | End: 2018-01-01 | Stop reason: HOSPADM

## 2018-01-01 RX ORDER — METRONIDAZOLE 500 MG/1
500 TABLET ORAL 3 TIMES DAILY
Qty: 78 TAB | Refills: 0 | Status: SHIPPED
Start: 2018-01-01 | End: 2018-01-01

## 2018-01-01 RX ORDER — ACETAMINOPHEN 325 MG/1
650 TABLET ORAL
Qty: 60 TAB | Refills: 0 | Status: SHIPPED | OUTPATIENT
Start: 2018-01-01 | End: 2018-01-01

## 2018-01-01 RX ORDER — HYDROMORPHONE HYDROCHLORIDE 2 MG/ML
0.5 INJECTION, SOLUTION INTRAMUSCULAR; INTRAVENOUS; SUBCUTANEOUS
Status: DISCONTINUED | OUTPATIENT
Start: 2018-01-01 | End: 2018-01-01 | Stop reason: HOSPADM

## 2018-01-01 RX ORDER — DOCUSATE SODIUM 100 MG/1
100 CAPSULE, LIQUID FILLED ORAL
Qty: 30 CAP | Refills: 2 | Status: SHIPPED | OUTPATIENT
Start: 2018-01-01 | End: 2018-01-01

## 2018-01-01 RX ORDER — SUCCINYLCHOLINE CHLORIDE 20 MG/ML
INJECTION INTRAMUSCULAR; INTRAVENOUS AS NEEDED
Status: DISCONTINUED | OUTPATIENT
Start: 2018-01-01 | End: 2018-01-01 | Stop reason: HOSPADM

## 2018-01-01 RX ORDER — ACETAMINOPHEN 325 MG/1
650 TABLET ORAL EVERY 6 HOURS
Status: DISCONTINUED | OUTPATIENT
Start: 2018-01-01 | End: 2018-01-01 | Stop reason: HOSPADM

## 2018-01-01 RX ORDER — DILTIAZEM HYDROCHLORIDE 30 MG/1
30 TABLET, FILM COATED ORAL
Status: DISCONTINUED | OUTPATIENT
Start: 2018-01-01 | End: 2018-01-01

## 2018-01-01 RX ORDER — HYDROMORPHONE HYDROCHLORIDE 2 MG/ML
0.5 INJECTION, SOLUTION INTRAMUSCULAR; INTRAVENOUS; SUBCUTANEOUS
Status: DISCONTINUED | OUTPATIENT
Start: 2018-01-01 | End: 2018-01-01

## 2018-01-01 RX ORDER — CIPROFLOXACIN 250 MG/1
250 TABLET, FILM COATED ORAL EVERY 12 HOURS
Qty: 20 TAB | Refills: 0 | Status: CANCELLED | OUTPATIENT
Start: 2018-01-01 | End: 2018-01-01

## 2018-01-01 RX ORDER — MAGNESIUM SULFATE HEPTAHYDRATE 40 MG/ML
2 INJECTION, SOLUTION INTRAVENOUS ONCE
Status: COMPLETED | OUTPATIENT
Start: 2018-01-01 | End: 2018-01-01

## 2018-01-01 RX ORDER — DIPHENHYDRAMINE HYDROCHLORIDE 50 MG/ML
12.5 INJECTION, SOLUTION INTRAMUSCULAR; INTRAVENOUS
Status: ACTIVE | OUTPATIENT
Start: 2018-01-01 | End: 2018-01-01

## 2018-01-01 RX ORDER — INSULIN LISPRO 100 [IU]/ML
INJECTION, SOLUTION INTRAVENOUS; SUBCUTANEOUS
Status: DISCONTINUED | OUTPATIENT
Start: 2018-01-01 | End: 2018-01-01 | Stop reason: HOSPADM

## 2018-01-01 RX ORDER — NEOSTIGMINE METHYLSULFATE 1 MG/ML
INJECTION INTRAVENOUS AS NEEDED
Status: DISCONTINUED | OUTPATIENT
Start: 2018-01-01 | End: 2018-01-01 | Stop reason: HOSPADM

## 2018-01-01 RX ORDER — DILTIAZEM HYDROCHLORIDE 30 MG/1
60 TABLET, FILM COATED ORAL
Status: DISCONTINUED | OUTPATIENT
Start: 2018-01-01 | End: 2018-01-01

## 2018-01-01 RX ORDER — ONDANSETRON 2 MG/ML
4 INJECTION INTRAMUSCULAR; INTRAVENOUS
Status: DISCONTINUED | OUTPATIENT
Start: 2018-01-01 | End: 2018-01-01 | Stop reason: HOSPADM

## 2018-01-01 RX ORDER — POTASSIUM CHLORIDE 7.45 MG/ML
10 INJECTION INTRAVENOUS
Status: COMPLETED | OUTPATIENT
Start: 2018-01-01 | End: 2018-01-01

## 2018-01-01 RX ORDER — METRONIDAZOLE 500 MG/100ML
500 INJECTION, SOLUTION INTRAVENOUS EVERY 12 HOURS
Status: DISCONTINUED | OUTPATIENT
Start: 2018-01-01 | End: 2018-01-01 | Stop reason: HOSPADM

## 2018-01-01 RX ORDER — SODIUM CHLORIDE 0.9 % (FLUSH) 0.9 %
5-10 SYRINGE (ML) INJECTION EVERY 8 HOURS
Status: CANCELLED | OUTPATIENT
Start: 2018-01-01

## 2018-01-01 RX ORDER — FENTANYL CITRATE 50 UG/ML
INJECTION, SOLUTION INTRAMUSCULAR; INTRAVENOUS AS NEEDED
Status: DISCONTINUED | OUTPATIENT
Start: 2018-01-01 | End: 2018-01-01 | Stop reason: HOSPADM

## 2018-01-01 RX ORDER — SODIUM CHLORIDE 0.9 % (FLUSH) 0.9 %
10-30 SYRINGE (ML) INJECTION AS NEEDED
Status: DISCONTINUED | OUTPATIENT
Start: 2018-01-01 | End: 2018-01-01 | Stop reason: HOSPADM

## 2018-01-01 RX ORDER — FACIAL-BODY WIPES
10 EACH TOPICAL DAILY PRN
Status: DISCONTINUED | OUTPATIENT
Start: 2018-01-01 | End: 2018-01-01 | Stop reason: HOSPADM

## 2018-01-01 RX ORDER — HYDROCODONE BITARTRATE AND ACETAMINOPHEN 5; 325 MG/1; MG/1
1 TABLET ORAL
Status: DISCONTINUED | OUTPATIENT
Start: 2018-01-01 | End: 2018-01-01 | Stop reason: HOSPADM

## 2018-01-01 RX ORDER — LEVOFLOXACIN 5 MG/ML
750 INJECTION, SOLUTION INTRAVENOUS EVERY 24 HOURS
Status: DISCONTINUED | OUTPATIENT
Start: 2018-01-01 | End: 2018-01-01

## 2018-01-01 RX ORDER — SODIUM CHLORIDE 0.9 % (FLUSH) 0.9 %
20 SYRINGE (ML) INJECTION EVERY 24 HOURS
Status: DISCONTINUED | OUTPATIENT
Start: 2018-01-01 | End: 2018-01-01 | Stop reason: HOSPADM

## 2018-01-01 RX ORDER — ACETAMINOPHEN 325 MG/1
650 TABLET ORAL
Status: DISCONTINUED | OUTPATIENT
Start: 2018-01-01 | End: 2018-01-01 | Stop reason: HOSPADM

## 2018-01-01 RX ORDER — CIPROFLOXACIN 250 MG/1
250 TABLET, FILM COATED ORAL EVERY 12 HOURS
Qty: 10 TAB | Refills: 0 | Status: SHIPPED | OUTPATIENT
Start: 2018-01-01 | End: 2018-01-01

## 2018-01-01 RX ORDER — BUSPIRONE HYDROCHLORIDE 5 MG/1
5 TABLET ORAL
Status: DISCONTINUED | OUTPATIENT
Start: 2018-01-01 | End: 2018-01-01 | Stop reason: HOSPADM

## 2018-01-01 RX ORDER — ROCURONIUM BROMIDE 10 MG/ML
INJECTION, SOLUTION INTRAVENOUS AS NEEDED
Status: DISCONTINUED | OUTPATIENT
Start: 2018-01-01 | End: 2018-01-01 | Stop reason: HOSPADM

## 2018-01-01 RX ORDER — SODIUM CHLORIDE 0.9 % (FLUSH) 0.9 %
10 SYRINGE (ML) INJECTION EVERY 24 HOURS
Status: DISCONTINUED | OUTPATIENT
Start: 2018-01-01 | End: 2018-01-01 | Stop reason: HOSPADM

## 2018-01-01 RX ORDER — DOCUSATE SODIUM 100 MG/1
100 CAPSULE, LIQUID FILLED ORAL DAILY
Status: DISCONTINUED | OUTPATIENT
Start: 2018-01-01 | End: 2018-01-01 | Stop reason: HOSPADM

## 2018-01-01 RX ORDER — SODIUM CHLORIDE 9 MG/ML
100 INJECTION, SOLUTION INTRAVENOUS CONTINUOUS
Status: DISCONTINUED | OUTPATIENT
Start: 2018-01-01 | End: 2018-01-01

## 2018-01-01 RX ORDER — SODIUM CHLORIDE, SODIUM LACTATE, POTASSIUM CHLORIDE, CALCIUM CHLORIDE 600; 310; 30; 20 MG/100ML; MG/100ML; MG/100ML; MG/100ML
100 INJECTION, SOLUTION INTRAVENOUS CONTINUOUS
Status: DISCONTINUED | OUTPATIENT
Start: 2018-01-01 | End: 2018-01-01 | Stop reason: HOSPADM

## 2018-01-01 RX ORDER — METRONIDAZOLE 500 MG/100ML
500 INJECTION, SOLUTION INTRAVENOUS EVERY 12 HOURS
Status: COMPLETED | OUTPATIENT
Start: 2018-01-01 | End: 2018-01-01

## 2018-01-01 RX ORDER — OXYCODONE HYDROCHLORIDE 5 MG/1
5 TABLET ORAL
Qty: 20 TAB | Refills: 0 | Status: SHIPPED | OUTPATIENT
Start: 2018-01-01

## 2018-01-01 RX ORDER — OXYCODONE HYDROCHLORIDE 5 MG/1
5 TABLET ORAL
Status: DISCONTINUED | OUTPATIENT
Start: 2018-01-01 | End: 2018-01-01 | Stop reason: HOSPADM

## 2018-01-01 RX ORDER — ACETAMINOPHEN 325 MG/1
650 TABLET ORAL
Qty: 30 TAB | Refills: 5 | Status: SHIPPED | OUTPATIENT
Start: 2018-01-01 | End: 2018-01-01

## 2018-01-01 RX ORDER — HEPARIN SODIUM 5000 [USP'U]/ML
5000 INJECTION, SOLUTION INTRAVENOUS; SUBCUTANEOUS EVERY 8 HOURS
Status: DISCONTINUED | OUTPATIENT
Start: 2018-01-01 | End: 2018-01-01 | Stop reason: HOSPADM

## 2018-01-01 RX ORDER — DEXTROSE MONOHYDRATE AND SODIUM CHLORIDE 5; .9 G/100ML; G/100ML
75 INJECTION, SOLUTION INTRAVENOUS CONTINUOUS
Status: DISCONTINUED | OUTPATIENT
Start: 2018-01-01 | End: 2018-01-01 | Stop reason: HOSPADM

## 2018-01-01 RX ORDER — GLYCOPYRROLATE 0.2 MG/ML
INJECTION INTRAMUSCULAR; INTRAVENOUS AS NEEDED
Status: DISCONTINUED | OUTPATIENT
Start: 2018-01-01 | End: 2018-01-01 | Stop reason: HOSPADM

## 2018-01-01 RX ORDER — LEVOFLOXACIN 5 MG/ML
500 INJECTION, SOLUTION INTRAVENOUS EVERY 24 HOURS
Status: DISCONTINUED | OUTPATIENT
Start: 2018-01-01 | End: 2018-01-01

## 2018-01-01 RX ORDER — POTASSIUM CHLORIDE 750 MG/1
20 TABLET, FILM COATED, EXTENDED RELEASE ORAL ONCE
Status: COMPLETED | OUTPATIENT
Start: 2018-01-01 | End: 2018-01-01

## 2018-01-01 RX ORDER — SODIUM CHLORIDE 0.9 % (FLUSH) 0.9 %
10 SYRINGE (ML) INJECTION AS NEEDED
Status: DISCONTINUED | OUTPATIENT
Start: 2018-01-01 | End: 2018-01-01 | Stop reason: HOSPADM

## 2018-01-01 RX ORDER — OXYCODONE HYDROCHLORIDE 5 MG/1
5 TABLET ORAL
Qty: 15 TAB | Refills: 0 | Status: SHIPPED | OUTPATIENT
Start: 2018-01-01 | End: 2018-01-01

## 2018-01-01 RX ORDER — SODIUM CHLORIDE 0.9 % (FLUSH) 0.9 %
10-40 SYRINGE (ML) INJECTION EVERY 8 HOURS
Status: DISCONTINUED | OUTPATIENT
Start: 2018-01-01 | End: 2018-01-01 | Stop reason: HOSPADM

## 2018-01-01 RX ORDER — FACIAL-BODY WIPES
10 EACH TOPICAL DAILY
Status: DISCONTINUED | OUTPATIENT
Start: 2018-01-01 | End: 2018-01-01 | Stop reason: HOSPADM

## 2018-01-01 RX ORDER — HYDROMORPHONE HYDROCHLORIDE 2 MG/ML
0.2 INJECTION, SOLUTION INTRAMUSCULAR; INTRAVENOUS; SUBCUTANEOUS
Status: DISCONTINUED | OUTPATIENT
Start: 2018-01-01 | End: 2018-01-01

## 2018-01-01 RX ORDER — DEXTROSE, SODIUM CHLORIDE, AND POTASSIUM CHLORIDE 5; .45; .15 G/100ML; G/100ML; G/100ML
75 INJECTION INTRAVENOUS CONTINUOUS
Status: DISCONTINUED | OUTPATIENT
Start: 2018-01-01 | End: 2018-01-01

## 2018-01-01 RX ADMIN — METRONIDAZOLE 500 MG: 500 INJECTION, SOLUTION INTRAVENOUS at 01:43

## 2018-01-01 RX ADMIN — GLYCOPYRROLATE 0.4 MG: 0.2 INJECTION INTRAMUSCULAR; INTRAVENOUS at 20:55

## 2018-01-01 RX ADMIN — LEVOFLOXACIN 500 MG: 5 INJECTION, SOLUTION INTRAVENOUS at 14:22

## 2018-01-01 RX ADMIN — CEFEPIME HYDROCHLORIDE 2 G: 2 INJECTION, POWDER, FOR SOLUTION INTRAVENOUS at 04:06

## 2018-01-01 RX ADMIN — Medication 10 ML: at 06:27

## 2018-01-01 RX ADMIN — DOCUSATE SODIUM 100 MG: 100 CAPSULE, LIQUID FILLED ORAL at 17:51

## 2018-01-01 RX ADMIN — QUETIAPINE FUMARATE 25 MG: 25 TABLET ORAL at 21:16

## 2018-01-01 RX ADMIN — ACETAMINOPHEN 650 MG: 325 TABLET ORAL at 23:18

## 2018-01-01 RX ADMIN — ACETAMINOPHEN 650 MG: 325 TABLET ORAL at 13:48

## 2018-01-01 RX ADMIN — LEVOFLOXACIN 500 MG: 5 INJECTION, SOLUTION INTRAVENOUS at 15:38

## 2018-01-01 RX ADMIN — SODIUM CHLORIDE 40 MG: 9 INJECTION INTRAMUSCULAR; INTRAVENOUS; SUBCUTANEOUS at 09:09

## 2018-01-01 RX ADMIN — CEFEPIME HYDROCHLORIDE 2 G: 2 INJECTION, POWDER, FOR SOLUTION INTRAVENOUS at 21:23

## 2018-01-01 RX ADMIN — HYDROMORPHONE HYDROCHLORIDE 0.5 MG: 2 INJECTION INTRAMUSCULAR; INTRAVENOUS; SUBCUTANEOUS at 02:28

## 2018-01-01 RX ADMIN — ENOXAPARIN SODIUM 40 MG: 40 INJECTION SUBCUTANEOUS at 08:53

## 2018-01-01 RX ADMIN — HYDROCODONE BITARTRATE AND ACETAMINOPHEN 1 TABLET: 5; 325 TABLET ORAL at 21:45

## 2018-01-01 RX ADMIN — DOCUSATE SODIUM 100 MG: 100 CAPSULE, LIQUID FILLED ORAL at 09:23

## 2018-01-01 RX ADMIN — SODIUM CHLORIDE 40 MG: 9 INJECTION INTRAMUSCULAR; INTRAVENOUS; SUBCUTANEOUS at 22:29

## 2018-01-01 RX ADMIN — SUCCINYLCHOLINE CHLORIDE 80 MG: 20 INJECTION INTRAMUSCULAR; INTRAVENOUS at 19:57

## 2018-01-01 RX ADMIN — Medication 10 ML: at 05:07

## 2018-01-01 RX ADMIN — QUETIAPINE FUMARATE 25 MG: 25 TABLET ORAL at 22:02

## 2018-01-01 RX ADMIN — Medication 10 ML: at 13:28

## 2018-01-01 RX ADMIN — HEPARIN SODIUM 5000 UNITS: 5000 INJECTION, SOLUTION INTRAVENOUS; SUBCUTANEOUS at 21:19

## 2018-01-01 RX ADMIN — ACETAMINOPHEN 650 MG: 325 TABLET ORAL at 06:25

## 2018-01-01 RX ADMIN — KETOROLAC TROMETHAMINE 15 MG: 30 INJECTION, SOLUTION INTRAMUSCULAR at 11:52

## 2018-01-01 RX ADMIN — Medication 20 ML: at 13:00

## 2018-01-01 RX ADMIN — ACETAMINOPHEN 650 MG: 325 TABLET ORAL at 18:02

## 2018-01-01 RX ADMIN — ENOXAPARIN SODIUM 40 MG: 40 INJECTION SUBCUTANEOUS at 10:14

## 2018-01-01 RX ADMIN — ROCURONIUM BROMIDE 15 MG: 10 INJECTION, SOLUTION INTRAVENOUS at 20:02

## 2018-01-01 RX ADMIN — IOPAMIDOL 80 ML: 755 INJECTION, SOLUTION INTRAVENOUS at 13:29

## 2018-01-01 RX ADMIN — HYDROMORPHONE HYDROCHLORIDE 0.5 MG: 2 INJECTION INTRAMUSCULAR; INTRAVENOUS; SUBCUTANEOUS at 11:25

## 2018-01-01 RX ADMIN — HYDROMORPHONE HYDROCHLORIDE 0.5 MG: 2 INJECTION INTRAMUSCULAR; INTRAVENOUS; SUBCUTANEOUS at 04:14

## 2018-01-01 RX ADMIN — QUETIAPINE FUMARATE 25 MG: 25 TABLET ORAL at 21:56

## 2018-01-01 RX ADMIN — CEFEPIME HYDROCHLORIDE 2 G: 2 INJECTION, POWDER, FOR SOLUTION INTRAVENOUS at 04:09

## 2018-01-01 RX ADMIN — DOCUSATE SODIUM 100 MG: 100 CAPSULE, LIQUID FILLED ORAL at 08:31

## 2018-01-01 RX ADMIN — METHYLNALTREXONE BROMIDE 8 MG: 12 INJECTION, SOLUTION SUBCUTANEOUS at 10:56

## 2018-01-01 RX ADMIN — FENTANYL CITRATE 25 MCG: 50 INJECTION, SOLUTION INTRAMUSCULAR; INTRAVENOUS at 20:07

## 2018-01-01 RX ADMIN — BISACODYL 10 MG: 10 SUPPOSITORY RECTAL at 08:18

## 2018-01-01 RX ADMIN — HYDROCODONE BITARTRATE AND ACETAMINOPHEN 1 TABLET: 5; 325 TABLET ORAL at 10:06

## 2018-01-01 RX ADMIN — WATER 1 MG: 1 INJECTION INTRAMUSCULAR; INTRAVENOUS; SUBCUTANEOUS at 17:27

## 2018-01-01 RX ADMIN — Medication 10 ML: at 06:01

## 2018-01-01 RX ADMIN — HYDROCODONE BITARTRATE AND ACETAMINOPHEN 1 TABLET: 5; 325 TABLET ORAL at 13:27

## 2018-01-01 RX ADMIN — ROCURONIUM BROMIDE 3 MG: 10 INJECTION, SOLUTION INTRAVENOUS at 19:57

## 2018-01-01 RX ADMIN — BUSPIRONE HYDROCHLORIDE 5 MG: 5 TABLET ORAL at 09:44

## 2018-01-01 RX ADMIN — ESMOLOL HYDROCHLORIDE 10 MG: 10 INJECTION INTRAVENOUS at 20:39

## 2018-01-01 RX ADMIN — Medication 10 ML: at 05:28

## 2018-01-01 RX ADMIN — DOCUSATE SODIUM 100 MG: 100 CAPSULE, LIQUID FILLED ORAL at 09:45

## 2018-01-01 RX ADMIN — Medication 10 ML: at 21:16

## 2018-01-01 RX ADMIN — KETOROLAC TROMETHAMINE 15 MG: 30 INJECTION, SOLUTION INTRAMUSCULAR at 00:23

## 2018-01-01 RX ADMIN — SODIUM CHLORIDE 100 ML/HR: 900 INJECTION, SOLUTION INTRAVENOUS at 22:42

## 2018-01-01 RX ADMIN — CEFEPIME HYDROCHLORIDE 2 G: 2 INJECTION, POWDER, FOR SOLUTION INTRAVENOUS at 20:58

## 2018-01-01 RX ADMIN — SODIUM CHLORIDE 100 ML/HR: 900 INJECTION, SOLUTION INTRAVENOUS at 00:32

## 2018-01-01 RX ADMIN — SODIUM CHLORIDE 40 MG: 9 INJECTION INTRAMUSCULAR; INTRAVENOUS; SUBCUTANEOUS at 20:15

## 2018-01-01 RX ADMIN — FENTANYL CITRATE 25 MCG: 50 INJECTION, SOLUTION INTRAMUSCULAR; INTRAVENOUS at 19:57

## 2018-01-01 RX ADMIN — Medication 10 ML: at 05:27

## 2018-01-01 RX ADMIN — Medication 10 ML: at 15:00

## 2018-01-01 RX ADMIN — BUSPIRONE HYDROCHLORIDE 5 MG: 5 TABLET ORAL at 16:37

## 2018-01-01 RX ADMIN — HYDROCODONE BITARTRATE AND ACETAMINOPHEN 1 TABLET: 5; 325 TABLET ORAL at 08:53

## 2018-01-01 RX ADMIN — DOCUSATE SODIUM 100 MG: 100 CAPSULE, LIQUID FILLED ORAL at 08:54

## 2018-01-01 RX ADMIN — BUSPIRONE HYDROCHLORIDE 5 MG: 5 TABLET ORAL at 17:51

## 2018-01-01 RX ADMIN — KETOROLAC TROMETHAMINE 15 MG: 30 INJECTION, SOLUTION INTRAMUSCULAR at 18:03

## 2018-01-01 RX ADMIN — Medication 20 ML: at 13:28

## 2018-01-01 RX ADMIN — BUSPIRONE HYDROCHLORIDE 5 MG: 5 TABLET ORAL at 08:18

## 2018-01-01 RX ADMIN — HYDROCODONE BITARTRATE AND ACETAMINOPHEN 1 TABLET: 5; 325 TABLET ORAL at 15:38

## 2018-01-01 RX ADMIN — HYDROCODONE BITARTRATE AND ACETAMINOPHEN 1 TABLET: 5; 325 TABLET ORAL at 05:16

## 2018-01-01 RX ADMIN — CEFEPIME HYDROCHLORIDE 2 G: 2 INJECTION, POWDER, FOR SOLUTION INTRAVENOUS at 13:57

## 2018-01-01 RX ADMIN — LEVOFLOXACIN 500 MG: 5 INJECTION, SOLUTION INTRAVENOUS at 13:58

## 2018-01-01 RX ADMIN — Medication 10 ML: at 23:58

## 2018-01-01 RX ADMIN — CEFEPIME HYDROCHLORIDE 2 G: 2 INJECTION, POWDER, FOR SOLUTION INTRAVENOUS at 11:52

## 2018-01-01 RX ADMIN — HYDROMORPHONE HYDROCHLORIDE 0.5 MG: 2 INJECTION INTRAMUSCULAR; INTRAVENOUS; SUBCUTANEOUS at 11:50

## 2018-01-01 RX ADMIN — KETOROLAC TROMETHAMINE 15 MG: 30 INJECTION, SOLUTION INTRAMUSCULAR at 06:25

## 2018-01-01 RX ADMIN — HEPARIN SODIUM 5000 UNITS: 5000 INJECTION, SOLUTION INTRAVENOUS; SUBCUTANEOUS at 15:22

## 2018-01-01 RX ADMIN — SODIUM CHLORIDE: 900 INJECTION, SOLUTION INTRAVENOUS at 09:16

## 2018-01-01 RX ADMIN — METRONIDAZOLE 500 MG: 500 INJECTION, SOLUTION INTRAVENOUS at 13:27

## 2018-01-01 RX ADMIN — SODIUM CHLORIDE 40 MG: 9 INJECTION INTRAMUSCULAR; INTRAVENOUS; SUBCUTANEOUS at 09:15

## 2018-01-01 RX ADMIN — POTASSIUM CHLORIDE 10 MEQ: 10 INJECTION, SOLUTION INTRAVENOUS at 17:20

## 2018-01-01 RX ADMIN — DIATRIZOATE MEGLUMINE AND DIATRIZOATE SODIUM 30 ML: 660; 100 LIQUID ORAL; RECTAL at 09:58

## 2018-01-01 RX ADMIN — DOCUSATE SODIUM 100 MG: 100 CAPSULE, LIQUID FILLED ORAL at 11:52

## 2018-01-01 RX ADMIN — CEFOXITIN SODIUM 2 G: 2 POWDER, FOR SOLUTION INTRAVENOUS at 06:09

## 2018-01-01 RX ADMIN — MORPHINE SULFATE 4 MG: 4 INJECTION INTRAVENOUS at 18:08

## 2018-01-01 RX ADMIN — HYDROCODONE BITARTRATE AND ACETAMINOPHEN 1 TABLET: 5; 325 TABLET ORAL at 07:52

## 2018-01-01 RX ADMIN — ONDANSETRON 4 MG: 2 INJECTION INTRAMUSCULAR; INTRAVENOUS at 21:17

## 2018-01-01 RX ADMIN — IOPAMIDOL 95 ML: 755 INJECTION, SOLUTION INTRAVENOUS at 11:17

## 2018-01-01 RX ADMIN — BUSPIRONE HYDROCHLORIDE 5 MG: 5 TABLET ORAL at 08:40

## 2018-01-01 RX ADMIN — ENOXAPARIN SODIUM 40 MG: 40 INJECTION SUBCUTANEOUS at 22:53

## 2018-01-01 RX ADMIN — HYDROMORPHONE HYDROCHLORIDE 0.5 MG: 2 INJECTION INTRAMUSCULAR; INTRAVENOUS; SUBCUTANEOUS at 21:32

## 2018-01-01 RX ADMIN — CEFOXITIN SODIUM 2 G: 2 POWDER, FOR SOLUTION INTRAVENOUS at 20:51

## 2018-01-01 RX ADMIN — LEVOFLOXACIN 500 MG: 5 INJECTION, SOLUTION INTRAVENOUS at 13:54

## 2018-01-01 RX ADMIN — Medication 10 ML: at 06:02

## 2018-01-01 RX ADMIN — BUSPIRONE HYDROCHLORIDE 5 MG: 5 TABLET ORAL at 14:16

## 2018-01-01 RX ADMIN — BUSPIRONE HYDROCHLORIDE 5 MG: 5 TABLET ORAL at 18:31

## 2018-01-01 RX ADMIN — Medication 20 ML: at 13:54

## 2018-01-01 RX ADMIN — HYDROMORPHONE HYDROCHLORIDE 0.5 MG: 2 INJECTION INTRAMUSCULAR; INTRAVENOUS; SUBCUTANEOUS at 16:37

## 2018-01-01 RX ADMIN — ESMOLOL HYDROCHLORIDE 10 MG: 10 INJECTION INTRAVENOUS at 21:10

## 2018-01-01 RX ADMIN — HYDROMORPHONE HYDROCHLORIDE 0.5 MG: 2 INJECTION INTRAMUSCULAR; INTRAVENOUS; SUBCUTANEOUS at 06:29

## 2018-01-01 RX ADMIN — DOCUSATE SODIUM 100 MG: 100 CAPSULE, LIQUID FILLED ORAL at 08:18

## 2018-01-01 RX ADMIN — POTASSIUM CHLORIDE 20 MEQ: 750 TABLET, EXTENDED RELEASE ORAL at 09:16

## 2018-01-01 RX ADMIN — ACETAMINOPHEN 650 MG: 325 TABLET ORAL at 12:03

## 2018-01-01 RX ADMIN — SODIUM CHLORIDE, SODIUM LACTATE, POTASSIUM CHLORIDE, CALCIUM CHLORIDE: 600; 310; 30; 20 INJECTION, SOLUTION INTRAVENOUS at 19:50

## 2018-01-01 RX ADMIN — HYDROCODONE BITARTRATE AND ACETAMINOPHEN 1 TABLET: 5; 325 TABLET ORAL at 00:42

## 2018-01-01 RX ADMIN — METRONIDAZOLE 500 MG: 500 INJECTION, SOLUTION INTRAVENOUS at 12:34

## 2018-01-01 RX ADMIN — METRONIDAZOLE 500 MG: 500 INJECTION, SOLUTION INTRAVENOUS at 13:15

## 2018-01-01 RX ADMIN — DOCUSATE SODIUM 100 MG: 100 CAPSULE, LIQUID FILLED ORAL at 09:10

## 2018-01-01 RX ADMIN — BISACODYL 10 MG: 10 SUPPOSITORY RECTAL at 09:46

## 2018-01-01 RX ADMIN — SODIUM CHLORIDE 1000 ML: 900 INJECTION, SOLUTION INTRAVENOUS at 18:56

## 2018-01-01 RX ADMIN — ENOXAPARIN SODIUM 40 MG: 40 INJECTION SUBCUTANEOUS at 08:18

## 2018-01-01 RX ADMIN — OXYCODONE HYDROCHLORIDE 5 MG: 5 TABLET ORAL at 16:00

## 2018-01-01 RX ADMIN — SODIUM CHLORIDE 100 ML/HR: 900 INJECTION, SOLUTION INTRAVENOUS at 00:42

## 2018-01-01 RX ADMIN — CEFEPIME HYDROCHLORIDE 2 G: 2 INJECTION, POWDER, FOR SOLUTION INTRAVENOUS at 20:15

## 2018-01-01 RX ADMIN — SODIUM CHLORIDE 40 MG: 9 INJECTION INTRAMUSCULAR; INTRAVENOUS; SUBCUTANEOUS at 09:23

## 2018-01-01 RX ADMIN — DILTIAZEM HYDROCHLORIDE 30 MG: 30 TABLET, FILM COATED ORAL at 21:40

## 2018-01-01 RX ADMIN — IOPAMIDOL 100 ML: 755 INJECTION, SOLUTION INTRAVENOUS at 17:39

## 2018-01-01 RX ADMIN — HYDROCODONE BITARTRATE AND ACETAMINOPHEN 1 TABLET: 5; 325 TABLET ORAL at 07:02

## 2018-01-01 RX ADMIN — HEPARIN SODIUM 5000 UNITS: 5000 INJECTION, SOLUTION INTRAVENOUS; SUBCUTANEOUS at 21:40

## 2018-01-01 RX ADMIN — Medication 10 ML: at 13:54

## 2018-01-01 RX ADMIN — LEVOFLOXACIN 750 MG: 5 INJECTION, SOLUTION INTRAVENOUS at 22:47

## 2018-01-01 RX ADMIN — MORPHINE SULFATE 4 MG: 4 INJECTION INTRAVENOUS at 13:14

## 2018-01-01 RX ADMIN — DEXTROSE MONOHYDRATE, SODIUM CHLORIDE, AND POTASSIUM CHLORIDE 100 ML/HR: 50; 4.5; 1.49 INJECTION, SOLUTION INTRAVENOUS at 10:50

## 2018-01-01 RX ADMIN — METRONIDAZOLE 500 MG: 500 INJECTION, SOLUTION INTRAVENOUS at 09:11

## 2018-01-01 RX ADMIN — Medication 10 ML: at 21:47

## 2018-01-01 RX ADMIN — HYDROMORPHONE HYDROCHLORIDE 0.5 MG: 2 INJECTION INTRAMUSCULAR; INTRAVENOUS; SUBCUTANEOUS at 22:04

## 2018-01-01 RX ADMIN — POTASSIUM CHLORIDE 10 MEQ: 10 INJECTION, SOLUTION INTRAVENOUS at 18:16

## 2018-01-01 RX ADMIN — QUETIAPINE FUMARATE 25 MG: 25 TABLET ORAL at 21:15

## 2018-01-01 RX ADMIN — BUSPIRONE HYDROCHLORIDE 5 MG: 5 TABLET ORAL at 12:34

## 2018-01-01 RX ADMIN — METRONIDAZOLE 500 MG: 500 INJECTION, SOLUTION INTRAVENOUS at 12:58

## 2018-01-01 RX ADMIN — METRONIDAZOLE 500 MG: 500 INJECTION, SOLUTION INTRAVENOUS at 13:00

## 2018-01-01 RX ADMIN — HYDROCODONE BITARTRATE AND ACETAMINOPHEN 1 TABLET: 5; 325 TABLET ORAL at 21:16

## 2018-01-01 RX ADMIN — DOCUSATE SODIUM 100 MG: 100 CAPSULE, LIQUID FILLED ORAL at 10:35

## 2018-01-01 RX ADMIN — DEXTROSE MONOHYDRATE, SODIUM CHLORIDE, AND POTASSIUM CHLORIDE 100 ML/HR: 50; 4.5; 1.49 INJECTION, SOLUTION INTRAVENOUS at 14:31

## 2018-01-01 RX ADMIN — CEFEPIME HYDROCHLORIDE 2 G: 2 INJECTION, POWDER, FOR SOLUTION INTRAVENOUS at 11:33

## 2018-01-01 RX ADMIN — CEFEPIME HYDROCHLORIDE 2 G: 2 INJECTION, POWDER, FOR SOLUTION INTRAVENOUS at 20:18

## 2018-01-01 RX ADMIN — HYDROMORPHONE HYDROCHLORIDE 0.5 MG: 2 INJECTION INTRAMUSCULAR; INTRAVENOUS; SUBCUTANEOUS at 09:54

## 2018-01-01 RX ADMIN — Medication 10 ML: at 21:22

## 2018-01-01 RX ADMIN — FENTANYL CITRATE 25 MCG: 50 INJECTION, SOLUTION INTRAMUSCULAR; INTRAVENOUS at 20:23

## 2018-01-01 RX ADMIN — BUSPIRONE HYDROCHLORIDE 5 MG: 5 TABLET ORAL at 08:53

## 2018-01-01 RX ADMIN — KETOROLAC TROMETHAMINE 15 MG: 30 INJECTION, SOLUTION INTRAMUSCULAR at 17:24

## 2018-01-01 RX ADMIN — DEXTROSE MONOHYDRATE AND SODIUM CHLORIDE 75 ML/HR: 5; .9 INJECTION, SOLUTION INTRAVENOUS at 10:52

## 2018-01-01 RX ADMIN — BISACODYL 10 MG: 10 SUPPOSITORY RECTAL at 09:10

## 2018-01-01 RX ADMIN — CEFOXITIN SODIUM 2 G: 2 POWDER, FOR SOLUTION INTRAVENOUS at 06:34

## 2018-01-01 RX ADMIN — HYDROMORPHONE HYDROCHLORIDE 0.5 MG: 2 INJECTION INTRAMUSCULAR; INTRAVENOUS; SUBCUTANEOUS at 06:02

## 2018-01-01 RX ADMIN — BUSPIRONE HYDROCHLORIDE 5 MG: 5 TABLET ORAL at 11:48

## 2018-01-01 RX ADMIN — DEXTROSE MONOHYDRATE AND SODIUM CHLORIDE 75 ML/HR: 5; .9 INJECTION, SOLUTION INTRAVENOUS at 09:44

## 2018-01-01 RX ADMIN — BUSPIRONE HYDROCHLORIDE 5 MG: 5 TABLET ORAL at 17:34

## 2018-01-01 RX ADMIN — Medication 10 ML: at 21:56

## 2018-01-01 RX ADMIN — Medication 10 ML: at 05:16

## 2018-01-01 RX ADMIN — ACETAMINOPHEN 650 MG: 325 TABLET ORAL at 17:23

## 2018-01-01 RX ADMIN — CEFEPIME HYDROCHLORIDE 2 G: 2 INJECTION, POWDER, FOR SOLUTION INTRAVENOUS at 12:03

## 2018-01-01 RX ADMIN — DEXTROSE MONOHYDRATE, SODIUM CHLORIDE, AND POTASSIUM CHLORIDE 100 ML/HR: 50; 4.5; 1.49 INJECTION, SOLUTION INTRAVENOUS at 02:28

## 2018-01-01 RX ADMIN — HYDROCODONE BITARTRATE AND ACETAMINOPHEN 1 TABLET: 5; 325 TABLET ORAL at 14:22

## 2018-01-01 RX ADMIN — ENOXAPARIN SODIUM 40 MG: 40 INJECTION SUBCUTANEOUS at 08:39

## 2018-01-01 RX ADMIN — BUSPIRONE HYDROCHLORIDE 5 MG: 5 TABLET ORAL at 13:50

## 2018-01-01 RX ADMIN — METRONIDAZOLE 500 MG: 500 INJECTION, SOLUTION INTRAVENOUS at 10:53

## 2018-01-01 RX ADMIN — SODIUM CHLORIDE, SODIUM LACTATE, POTASSIUM CHLORIDE, CALCIUM CHLORIDE: 600; 310; 30; 20 INJECTION, SOLUTION INTRAVENOUS at 20:50

## 2018-01-01 RX ADMIN — CEFTRIAXONE SODIUM 2 G: 2 INJECTION, POWDER, FOR SOLUTION INTRAMUSCULAR; INTRAVENOUS at 17:15

## 2018-01-01 RX ADMIN — METRONIDAZOLE 500 MG: 500 INJECTION, SOLUTION INTRAVENOUS at 09:24

## 2018-01-01 RX ADMIN — Medication 10 ML: at 13:35

## 2018-01-01 RX ADMIN — MAGNESIUM SULFATE HEPTAHYDRATE 2 G: 40 INJECTION, SOLUTION INTRAVENOUS at 10:45

## 2018-01-01 RX ADMIN — HYDROMORPHONE HYDROCHLORIDE 0.5 MG: 2 INJECTION INTRAMUSCULAR; INTRAVENOUS; SUBCUTANEOUS at 20:54

## 2018-01-01 RX ADMIN — SODIUM CHLORIDE 770 ML: 900 INJECTION, SOLUTION INTRAVENOUS at 20:21

## 2018-01-01 RX ADMIN — KETOROLAC TROMETHAMINE 15 MG: 30 INJECTION, SOLUTION INTRAMUSCULAR at 23:18

## 2018-01-01 RX ADMIN — METRONIDAZOLE 500 MG: 500 INJECTION, SOLUTION INTRAVENOUS at 22:24

## 2018-01-01 RX ADMIN — BUSPIRONE HYDROCHLORIDE 5 MG: 5 TABLET ORAL at 09:10

## 2018-01-01 RX ADMIN — Medication 10 ML: at 14:22

## 2018-01-01 RX ADMIN — METRONIDAZOLE 500 MG: 500 INJECTION, SOLUTION INTRAVENOUS at 13:51

## 2018-01-01 RX ADMIN — Medication 10 ML: at 17:22

## 2018-01-01 RX ADMIN — CEFTRIAXONE SODIUM 2 G: 2 INJECTION, POWDER, FOR SOLUTION INTRAMUSCULAR; INTRAVENOUS at 17:52

## 2018-01-01 RX ADMIN — HYDROCODONE BITARTRATE AND ACETAMINOPHEN 1 TABLET: 5; 325 TABLET ORAL at 21:56

## 2018-01-01 RX ADMIN — METRONIDAZOLE 500 MG: 500 INJECTION, SOLUTION INTRAVENOUS at 21:42

## 2018-01-01 RX ADMIN — CEFOXITIN SODIUM 2 G: 2 POWDER, FOR SOLUTION INTRAVENOUS at 21:03

## 2018-01-01 RX ADMIN — CEFEPIME HYDROCHLORIDE 2 G: 2 INJECTION, POWDER, FOR SOLUTION INTRAVENOUS at 05:05

## 2018-01-01 RX ADMIN — CEFOXITIN SODIUM 2 G: 2 POWDER, FOR SOLUTION INTRAVENOUS at 13:37

## 2018-01-01 RX ADMIN — LIDOCAINE HYDROCHLORIDE 60 MG: 20 INJECTION, SOLUTION EPIDURAL; INFILTRATION; INTRACAUDAL; PERINEURAL at 19:57

## 2018-01-01 RX ADMIN — Medication 10 ML: at 05:40

## 2018-01-01 RX ADMIN — Medication 10 ML: at 22:42

## 2018-01-01 RX ADMIN — DOCUSATE SODIUM 100 MG: 100 CAPSULE, LIQUID FILLED ORAL at 17:22

## 2018-01-01 RX ADMIN — BUSPIRONE HYDROCHLORIDE 5 MG: 5 TABLET ORAL at 17:13

## 2018-01-01 RX ADMIN — ESMOLOL HYDROCHLORIDE 10 MG: 10 INJECTION INTRAVENOUS at 20:57

## 2018-01-01 RX ADMIN — METRONIDAZOLE 500 MG: 500 INJECTION, SOLUTION INTRAVENOUS at 09:49

## 2018-01-01 RX ADMIN — KETOROLAC TROMETHAMINE 15 MG: 30 INJECTION, SOLUTION INTRAMUSCULAR at 12:03

## 2018-01-01 RX ADMIN — BUSPIRONE HYDROCHLORIDE 5 MG: 5 TABLET ORAL at 08:31

## 2018-01-01 RX ADMIN — ESMOLOL HYDROCHLORIDE 10 MG: 10 INJECTION INTRAVENOUS at 20:29

## 2018-01-01 RX ADMIN — PROPOFOL 80 MG: 10 INJECTION, EMULSION INTRAVENOUS at 19:57

## 2018-01-01 RX ADMIN — METRONIDAZOLE 500 MG: 500 INJECTION, SOLUTION INTRAVENOUS at 01:33

## 2018-01-01 RX ADMIN — METRONIDAZOLE 500 MG: 500 INJECTION, SOLUTION INTRAVENOUS at 00:51

## 2018-01-01 RX ADMIN — ACETAMINOPHEN 650 MG: 325 TABLET ORAL at 11:51

## 2018-01-01 RX ADMIN — BUSPIRONE HYDROCHLORIDE 5 MG: 5 TABLET ORAL at 12:21

## 2018-01-01 RX ADMIN — Medication 10 ML: at 06:29

## 2018-01-01 RX ADMIN — NEOSTIGMINE METHYLSULFATE 2 MG: 1 INJECTION INTRAVENOUS at 20:55

## 2018-01-01 RX ADMIN — METRONIDAZOLE 500 MG: 500 INJECTION, SOLUTION INTRAVENOUS at 00:39

## 2018-01-01 RX ADMIN — HYDROCODONE BITARTRATE AND ACETAMINOPHEN 1 TABLET: 5; 325 TABLET ORAL at 04:28

## 2018-01-01 RX ADMIN — DEXTROSE MONOHYDRATE AND SODIUM CHLORIDE 75 ML/HR: 5; .9 INJECTION, SOLUTION INTRAVENOUS at 21:04

## 2018-01-01 RX ADMIN — METRONIDAZOLE 500 MG: 500 INJECTION, SOLUTION INTRAVENOUS at 14:11

## 2018-01-01 RX ADMIN — Medication 10 ML: at 21:43

## 2018-01-01 RX ADMIN — SODIUM CHLORIDE 40 MG: 9 INJECTION INTRAMUSCULAR; INTRAVENOUS; SUBCUTANEOUS at 05:05

## 2018-01-01 RX ADMIN — HYDROMORPHONE HYDROCHLORIDE 0.5 MG: 2 INJECTION INTRAMUSCULAR; INTRAVENOUS; SUBCUTANEOUS at 21:17

## 2018-01-01 RX ADMIN — HEPARIN SODIUM 5000 UNITS: 5000 INJECTION, SOLUTION INTRAVENOUS; SUBCUTANEOUS at 05:07

## 2018-01-01 RX ADMIN — HYDROCODONE BITARTRATE AND ACETAMINOPHEN 1 TABLET: 5; 325 TABLET ORAL at 12:34

## 2018-01-01 RX ADMIN — BUSPIRONE HYDROCHLORIDE 5 MG: 5 TABLET ORAL at 13:58

## 2018-01-01 RX ADMIN — POTASSIUM CHLORIDE 10 MEQ: 10 INJECTION, SOLUTION INTRAVENOUS at 10:41

## 2018-01-01 RX ADMIN — INSULIN LISPRO 2 UNITS: 100 INJECTION, SOLUTION INTRAVENOUS; SUBCUTANEOUS at 05:39

## 2018-01-01 RX ADMIN — FENTANYL CITRATE 25 MCG: 50 INJECTION, SOLUTION INTRAMUSCULAR; INTRAVENOUS at 20:15

## 2018-01-01 RX ADMIN — DOCUSATE SODIUM 100 MG: 100 CAPSULE, LIQUID FILLED ORAL at 18:31

## 2018-01-01 RX ADMIN — QUETIAPINE FUMARATE 25 MG: 25 TABLET ORAL at 21:02

## 2018-01-01 RX ADMIN — ONDANSETRON 4 MG: 2 INJECTION INTRAMUSCULAR; INTRAVENOUS at 20:49

## 2018-01-01 RX ADMIN — DEXAMETHASONE SODIUM PHOSPHATE 4 MG: 4 INJECTION, SOLUTION INTRA-ARTICULAR; INTRALESIONAL; INTRAMUSCULAR; INTRAVENOUS; SOFT TISSUE at 20:12

## 2018-01-01 RX ADMIN — BUSPIRONE HYDROCHLORIDE 5 MG: 5 TABLET ORAL at 13:27

## 2018-01-01 RX ADMIN — SODIUM CHLORIDE 40 MG: 9 INJECTION INTRAMUSCULAR; INTRAVENOUS; SUBCUTANEOUS at 20:52

## 2018-01-01 RX ADMIN — SODIUM CHLORIDE 100 ML/HR: 900 INJECTION, SOLUTION INTRAVENOUS at 09:56

## 2018-01-01 RX ADMIN — DILTIAZEM HYDROCHLORIDE 30 MG: 30 TABLET, FILM COATED ORAL at 06:25

## 2018-01-01 RX ADMIN — HEPARIN SODIUM 5000 UNITS: 5000 INJECTION, SOLUTION INTRAVENOUS; SUBCUTANEOUS at 14:59

## 2018-01-01 RX ADMIN — DOCUSATE SODIUM 100 MG: 100 CAPSULE, LIQUID FILLED ORAL at 17:34

## 2018-01-01 RX ADMIN — METRONIDAZOLE 500 MG: 500 INJECTION, SOLUTION INTRAVENOUS at 22:47

## 2018-01-01 RX ADMIN — ROCURONIUM BROMIDE 10 MG: 10 INJECTION, SOLUTION INTRAVENOUS at 20:23

## 2018-01-01 RX ADMIN — Medication 10 ML: at 13:04

## 2018-01-01 RX ADMIN — SODIUM CHLORIDE 100 ML/HR: 900 INJECTION, SOLUTION INTRAVENOUS at 14:59

## 2018-01-01 RX ADMIN — CEFEPIME HYDROCHLORIDE 2 G: 2 INJECTION, POWDER, FOR SOLUTION INTRAVENOUS at 04:56

## 2018-01-01 RX ADMIN — BUSPIRONE HYDROCHLORIDE 5 MG: 5 TABLET ORAL at 17:15

## 2018-01-01 RX ADMIN — Medication 10 ML: at 22:30

## 2018-01-01 RX ADMIN — DOCUSATE SODIUM 100 MG: 100 CAPSULE, LIQUID FILLED ORAL at 08:40

## 2018-01-01 RX ADMIN — HEPARIN SODIUM 5000 UNITS: 5000 INJECTION, SOLUTION INTRAVENOUS; SUBCUTANEOUS at 06:25

## 2018-01-01 RX ADMIN — ACETAMINOPHEN 650 MG: 325 TABLET ORAL at 00:23

## 2018-01-01 RX ADMIN — ONDANSETRON 8 MG: 2 INJECTION INTRAMUSCULAR; INTRAVENOUS at 21:54

## 2018-01-01 RX ADMIN — Medication 10 ML: at 13:00

## 2018-01-01 RX ADMIN — KETOROLAC TROMETHAMINE 15 MG: 30 INJECTION, SOLUTION INTRAMUSCULAR at 05:06

## 2018-01-01 RX ADMIN — Medication 10 ML: at 13:50

## 2018-01-01 RX ADMIN — CEFOXITIN SODIUM 2 G: 2 POWDER, FOR SOLUTION INTRAVENOUS at 14:31

## 2018-01-01 RX ADMIN — INSULIN LISPRO 2 UNITS: 100 INJECTION, SOLUTION INTRAVENOUS; SUBCUTANEOUS at 11:59

## 2018-01-01 RX ADMIN — ACETAMINOPHEN 650 MG: 325 TABLET ORAL at 11:48

## 2018-01-01 RX ADMIN — Medication 10 ML: at 14:00

## 2018-01-01 RX ADMIN — METRONIDAZOLE 500 MG: 500 INJECTION, SOLUTION INTRAVENOUS at 01:00

## 2018-01-01 RX ADMIN — DEXTROSE MONOHYDRATE, SODIUM CHLORIDE, AND POTASSIUM CHLORIDE 75 ML/HR: 50; 4.5; 1.49 INJECTION, SOLUTION INTRAVENOUS at 10:04

## 2018-01-01 RX ADMIN — BISACODYL 10 MG: 10 SUPPOSITORY RECTAL at 08:31

## 2018-01-01 RX ADMIN — QUETIAPINE FUMARATE 25 MG: 25 TABLET ORAL at 21:45

## 2018-01-01 RX ADMIN — ENOXAPARIN SODIUM 40 MG: 40 INJECTION SUBCUTANEOUS at 09:10

## 2018-01-01 RX ADMIN — DOCUSATE SODIUM 100 MG: 100 CAPSULE, LIQUID FILLED ORAL at 09:16

## 2018-01-01 RX ADMIN — Medication 10 ML: at 13:01

## 2018-01-01 RX ADMIN — DOCUSATE SODIUM 100 MG: 100 CAPSULE, LIQUID FILLED ORAL at 17:15

## 2018-01-01 RX ADMIN — ACETAMINOPHEN 650 MG: 325 TABLET ORAL at 05:07

## 2018-01-01 RX ADMIN — FENTANYL CITRATE 25 MCG: 50 INJECTION, SOLUTION INTRAMUSCULAR; INTRAVENOUS at 20:58

## 2018-01-01 RX ADMIN — CEFTRIAXONE SODIUM 2 G: 2 INJECTION, POWDER, FOR SOLUTION INTRAMUSCULAR; INTRAVENOUS at 18:30

## 2018-01-01 RX ADMIN — DILTIAZEM HYDROCHLORIDE 30 MG: 30 TABLET, FILM COATED ORAL at 16:00

## 2018-01-01 RX ADMIN — POTASSIUM CHLORIDE 10 MEQ: 10 INJECTION, SOLUTION INTRAVENOUS at 11:52

## 2018-01-01 RX ADMIN — ENOXAPARIN SODIUM 40 MG: 40 INJECTION SUBCUTANEOUS at 10:36

## 2018-01-01 RX ADMIN — METRONIDAZOLE 500 MG: 500 INJECTION, SOLUTION INTRAVENOUS at 21:19

## 2018-01-01 RX ADMIN — METHYLNALTREXONE BROMIDE 8 MG: 12 INJECTION, SOLUTION SUBCUTANEOUS at 09:18

## 2018-04-05 NOTE — PROGRESS NOTES
Chief Complaint   Patient presents with    Abdominal Pain     1. Have you been to the ER, urgent care clinic since your last visit? Hospitalized since your last visit? No    2. Have you seen or consulted any other health care providers outside of the [address] [place] [address] since your last visit? Include any pap smears or colon screening.  No

## 2018-04-05 NOTE — ACP (ADVANCE CARE PLANNING)
Ws having pain acorss her abdomen  Pain is better    Feels like she is puffy all the time    Has difficulty going to urinate  See Dr. Ruben Ortega for her bladder    BM every day    Last colonoscopy several years ago    Denies blood in urine or stool    Feels like her abdomen is getting larger    Only taking cranberry extract    Has a cane and a walker her  states she can use

## 2018-04-12 NOTE — PROGRESS NOTES
Presents with her  with complaint of abdominal pain    Pt with cognitive impairment, asking the same question over and over again    Pleasantly confused     stated that he brought her in because he wanted to make sure that \"she did not have cancer or something like that\"    Denies nausea, vomiting, fever, diarrhea, constipation    Hx of UTIs, has been followed by urology in the past; has multiple drug allergies listed, however  states that he does not know that the patient is allergic to anything but sulfa drugs    Social Hx:  ,  brought her to appointment    PE:  General -- awake, alert, cooperative, pleasantly confused  Lungs -- clear bilaterally  CV - regular, S1S2  abd -- soft, ND, tenderness to light palpation along the right colon, no definitive masses palpated  Ext -- no edema    U/A:  Sent for culture    Assessment:  Abdominal pain, unclear etiology -- difficult to obtain history  Hx UTIs -- concern for UTI  Cognitive impairment    Plan:  Schedule abdominal/pelvic US  Urine culture  F/U prn    Greater than 50% of this 25 minute visit was counseling and obtaining hx in light of patient's cognitive impairment.

## 2018-04-13 NOTE — PROGRESS NOTES
Called and spoke to patient regarding allergy to cipro.  states she is not allergic to it and she has taken it before recently. WIll have  send an rx.

## 2018-04-17 NOTE — PROGRESS NOTES
[address] [facility], [address] [place] [address] Drive  Phone: [phone]  Fax: [phone]    Plan of Care/Statement of Necessity for Physical Therapy Services  2-15    Patient name: Brayan Stockton  : 1934  Provider#: [id number]  Referral source: Chayito Staton MD      Medical/Treatment Diagnosis: Other abnormalities of gait and mobility [R26.89]     Prior Hospitalization: see medical history     Comorbidities: Anxiety, GERD, Dizziness, Fatigue, Dementia, [place], arthritis in her hands  Prior Level of Function: Pt lives with her   Medications: Verified on Patient Summary List  Start of Care: 18      Onset Date: 4 months ago   The Plan of Care and following information is based on the information from the initial evaluation. Assessment/ key information: The patient presents with progressively worsening balance complicated by sedentary lifestyle and presence of dementia. Evaluation Complexity History HIGH Complexity :3+ comorbidities / personal factors will impact the outcome/ POC ; Examination HIGH Complexity : 4+ Standardized tests and measures addressing body structure, function, activity limitation and / or participation in recreation  ;Presentation HIGH Complexity : Unstable and unpredictable characteristics  ; Clinical Decision Making MEDIUM Complexity : FOTO score of 26-74  Overall Complexity Rating: MEDIUM    Problem List: pain affecting function, decrease ROM, decrease strength, impaired gait/ balance, decrease ADL/ functional abilitiies, decrease activity tolerance, decrease flexibility/ joint mobility and decrease transfer abilities   Treatment Plan may include any combination of the following: Therapeutic exercise, Neuromuscular re-education, Physical agent/modality, Gait/balance training, Manual therapy, Patient education, Self Care training, Functional mobility training, Home safety training and Stair training  Patient / Family readiness to learn indicated by: asking questions, trying to perform skills and interest  Persons(s) to be included in education: patient (P),   Barriers to Learning/Limitations: yes;  cognitive  Patient Goal (s): I'm unsure  Patient Self Reported Health Status: good  Rehabilitation Potential: good    Short Term Goals: To be accomplished in 4 weeks:  1) The patient will be compliant with introductory HEP with the help of her   2) The patient will demonstrate negative Romberg to improve stability in dimly lit environments  3) The patient will demonstrate ability to complete 30 minutes of moderate intensity therapeutic exercise without a rest break > 2 minutes  Long Term Goals: To be accomplished in 12 weeks:  1) The patient will demonstrate ability to hold sharpened Romberg stance EO for 30 seconds to indicate decreased risk of falls  2) The patient will report ability to shower without fear of falling  3) The patient will t/f sit to stand without UE assist to improve mobility in the home  Frequency / Duration: Patient to be seen 1 times per week for 12 weeks. Patient/ Caregiver education and instruction: self care, activity modification and exercises    [x]  Plan of care has been reviewed with PTA    G-Codes (GP)  Mobility   Current  CK= 40-59%   Goal  CJ= 20-39%    The severity rating is based on clinical judgment and the FOTO Score score. Certification Period: 4/17/18-7/17/18  Thelma Choi, PT 4/17/2018 3:14 PM    ________________________________________________________________________    I certify that the above Therapy Services are being furnished while the patient is under my care. I agree with the treatment plan and certify that this therapy is necessary.     [de-identified] Signature:____________________  Date:____________Time: _________

## 2018-04-17 NOTE — PROGRESS NOTES
PT INITIAL EVALUATION NOTE - [facility] 2-15    Patient Name: Lin Malone  Date:2018  : 1934  [x]  Patient  Verified  Payor: [place] / Plan: [place] / Product Type: Medicare /    In time:2:10 PM  Out time:2:50 PM  Total Treatment Time (min): 40  Total Timed Codes (min): 15  1:1 Treatment Time ( [address] only): 40   Visit #: 1     Treatment Area: Other abnormalities of gait and mobility [R26.89]    SUBJECTIVE  Pain Level (0-10 scale): 0  Any medication changes, allergies to medications, adverse drug reactions, diagnosis change, or new procedure performed?: [] No    [x] Yes (see summary sheet for update)  Subjective:    Pt reports she had one fall a week ago. \"She was coming into the house and fell between the two doors. \" Per  Lee Velazco. Pt reports she did not hurt herself during the fall. Pt reports she also had one 2-3 months ago but she and her  are unsure of what happened. Pt denies dizziness. Pt denies sensitivity to noise/ light. Pt denies HA. PLOF: Pt lives with her   Mechanism of Injury: Insidious onset  Previous Treatment/Compliance: None  PMHx/Surgical Hx: Anxiety, GERD, Dizziness, Fatigue, Dementia, [place], arthritis in her hands  Work Hx: Retired  Living Situation: Lives in a one story home with her   Pt Goals: \"I'm unsure\"  Barriers: Dementia   Motivation: Good  Substance use: None  FABQ Score: see FOTO  Cognition: A & O x 3        OBJECTIVE/EXAMINATION    Posture:   Forward flexed posture  Gait:  Decreased chucky, uneven step length      LOWER QUARTER   MUSCLE STRENGTH  KEY       R  L  0 - No Contraction  L1, L2 Psoas  4  4    1 - Trace   L3 Quads  4+  5    2 - Poor   L4 Tib Ant  5  5    3 - Fair    L5 EHL  5  5    4 - Good   S1 FHL  5  5    5 - Normal   S2 Hams  5  4          Sensation: WNL    Balance:        Standing Balance:    Static: 30 seconds EO 10 seconds EC   Rhomber seconds EO Unable EC   Sharpened Rhomber seconds EO   Dynamic: Transfers:       Sit-Stand: Pt requires B UE to t/f sit to stand    10 min Therapeutic Exercise:  [x] See flow sheet :   Rationale: increase ROM and increase strength to improve the patients ability to sit, stand, transfer, ambulate, lift, carry, reach, complete ADLs    10 min Neuromuscular Re-education:  [x]  See flow sheet :   Rationale: improve coordination, improve balance and increase proprioception  to improve the patients ability to sit, stand, transfer, ambulate, lift, carry, reach, complete ADLs        With   [x] TE   [] TA   [x] neuro   [] other: Patient Education: [x] Review HEP    [] Progressed/Changed HEP based on:   [x] positioning   [x] body mechanics   [] transfers   [] heat/ice application    [] other:        Pain Level (0-10 scale) post treatment: 0    ASSESSMENT/Changes in Function:     [x]  See Plan of [facility], PT 4/17/2018  2:09 PM

## 2018-04-29 PROBLEM — R10.9 ABDOMINAL PAIN: Status: ACTIVE | Noted: 2018-01-01

## 2018-04-29 NOTE — ED PROVIDER NOTES
HPI Comments: 80 y.o. female with past medical history significant for Dementia, GERD, UTI, Bladder lift who presents from home via EMS accompanied by family with chief complaint of abdominal pain. Pt reports generalized abdominal pain intermittently for \"awhile\", most significant over the past couple of days. Pt describes the pain as soreness and states that she feels bloated. Per spouse, pt had an episode of vomiting this morning and has been generally weak. She has also had a decreased appetite. Per family, she was not able to stand on her own shortly prior to arrival and was lowered to the floor by family. EMS was contacted. While in ED, pt notes that her urine has decreased. She denies dysuria or hematuria. Pt further denies fever, chills, diarrhea, constipation, leg pain, leg swelling rash, chest pain, cough or shortness of breath. There are no other acute medical concerns at this time. Full history, physical exam, and ROS unable to be obtained due to:  Dementia. PCP: Prabhakar Mckeon MD    Note written by Manuel Jennings, as dictated by Tamera Castañeda. Cindy Travis MD 5:31 PM         The history is provided by the patient and a relative. No  was used. Past Medical History:   Diagnosis Date    Anxiety 4/23/2010    GERD (gastroesophageal reflux disease) 4/23/2010    Mild dementia     UTI (lower urinary tract infection) 4/23/2010       Past Surgical History:   Procedure Laterality Date    ENDOSCOPY, COLON, DIAGNOSTIC      HX CATARACT REMOVAL  5/2011    left    HX UROLOGICAL      bladder uplift         Family History:   Problem Relation Age of Onset    Cancer Father        Social History     Social History    Marital status:      Spouse name: N/A    Number of children: N/A    Years of education: N/A     Occupational History    Not on file.      Social History Main Topics    Smoking status: Never Smoker    Smokeless tobacco: Never Used    Alcohol use No  Drug use: No    Sexual activity: Not Currently     Other Topics Concern    Not on file     Social History Narrative         ALLERGIES: Ciprofloxacin; Macrodantin [nitrofurantoin macrocrystalline]; Pcn [penicillins]; and Sulfa (sulfonamide antibiotics)    Review of Systems   Constitutional: Positive for appetite change (decreased). Gastrointestinal: Positive for abdominal distention, abdominal pain and vomiting. Genitourinary: Positive for decreased urine volume. ROS limited due to: Dementia    Vitals:    04/29/18 1713   BP: 152/74   Pulse: 95   Resp: 28   Temp: 99.1 °F (37.3 °C)   SpO2: 94%   Height: 5' 3\" (1.6 m)            Physical Exam   Constitutional: She appears well-developed and well-nourished. Elderly   HENT:   Head: Normocephalic and atraumatic. Mouth/Throat: Oropharynx is clear and moist.   Eyes: Conjunctivae and EOM are normal. Pupils are equal, round, and reactive to light. No scleral icterus. Neck: Neck supple. No tracheal deviation present. Cardiovascular: Normal rate, regular rhythm, normal heart sounds and intact distal pulses. Pulmonary/Chest: Effort normal and breath sounds normal. No respiratory distress. Abdominal: Soft. She exhibits no distension. There is generalized tenderness. There is no rebound and no guarding. Genitourinary:   Genitourinary Comments: deferred   Musculoskeletal: She exhibits no edema. Neurological: She is alert. Skin: Skin is warm and dry. Psychiatric: She has a normal mood and affect. Nursing note and vitals reviewed. Note written by Manuel Curran, as dictated by Rosales Steiner. Gayathri Paige MD 5:35 PM    MDM  Number of Diagnoses or Management Options  Sepsis, due to unspecified organism [facility]):   Diagnosis management comments: 81 yo female p/w abdominal pain, vomiting, fever.   Diff dx: cholecystitis, appendicitis, UTI, mesenteric ischemia    Labs remarkable for elevated lactic acid, WBC  CT, US unremarkable    Plan: IVF, blood cultures, Iv antibiotics, admit    Total critical care time spent exclusive of procedures:  37 minutes    Yoel Vasquez MD          ED Course       Procedures    CONSULT NOTE:  8:32 PM  Sheela Vasquez MD spoke with [facility], Consult for admission. Discussed available diagnostic tests and clinical findings. Family Practice will see and admit patient for further evaluation and treatment. ED EKG interpretation:  Rhythm: normal sinus rhythm and PAC's;  Rate (approx.): 98; Axis: left axis deviation; QRS interval: normal ; ST/T wave: T wave inverted anterior;  Other findings: infarct prior septal.

## 2018-04-29 NOTE — IP AVS SNAPSHOT
[address] 
[phone] Patient: Ernst Bowen MRN: [id number] FWX:[birth year] A check rachael indicates which time of day the medication should be taken. My Medications START taking these medications Instructions Each Dose to Equal  
 Morning Noon Evening Bedtime  
 acetaminophen 325 mg tablet Commonly known as:  TYLENOL Your last dose was:  1:50 PM  
   
 Take 2 Tabs by mouth every six (6) hours as needed for Pain for up to 30 days. 650 mg  
    
   
   
   
  
 apixaban 2.5 mg tablet Commonly known as:  Roshan Bogpraveena Start taking on:  5/4/2018 Take 1 Tab by mouth two (2) times a day. 2.5 mg  
    
   
   
   
  
 docusate sodium 100 mg capsule Commonly known as:  Mimi Bedolla Your last dose was:  09:20 AM  
   
 Take 1 Cap by mouth two (2) times daily as needed for Constipation for up to 90 days. 100 mg Due  
   
  
 levoFLOXacin 750 mg tablet Commonly known as:  Filipe Greene Your next dose is: Tonight Take 1 Tab by mouth daily for 9 days. 750 mg Due  
  
 metroNIDAZOLE 500 mg tablet Commonly known as:  FLAGYL Your last dose was: This afternoon Take 1 Tab by mouth three (3) times daily for 9 days. 500 mg  
    
   
   
   
  
 oxyCODONE IR 5 mg immediate release tablet Commonly known as:  Vinicio Maid Your last dose was:  5/2/18 at 4:00 PM  
   
 Take 1 Tab by mouth every four (4) hours as needed. Max Daily Amount: 30 mg.  
 5 mg CONTINUE taking these medications Instructions Each Dose to Equal  
 Morning Noon Evening Bedtime  
 cranberry extract 450 mg Tab tablet Take 450 mg by mouth daily. 450 mg Where to Get Your Medications Information on where to get these meds will be given to you by the nurse or doctor. ! Ask your nurse or doctor about these medications  
  acetaminophen 325 mg tablet  
 apixaban 2.5 mg tablet  
 docusate sodium 100 mg capsule  
 levoFLOXacin 750 mg tablet  
 metroNIDAZOLE 500 mg tablet  
 oxyCODONE IR 5 mg immediate release tablet

## 2018-04-29 NOTE — ED TRIAGE NOTES
Pt arrives via EMS from home c/o generalized weakness. EMS arrived to home to find pt resting on the floor; pt was guided to the floor after coming out of the bathroom because she was feeling weak. Pt usually ambulates with walker. She lives at home with family members who are on their way. PMH of dementia; pt does not remember what happened or why she is here. Also C/O cynthia lower abdominal pain that is tender upon palpation and worsened on the ride over here.

## 2018-04-29 NOTE — IP AVS SNAPSHOT
[address] [facility] 
 
 
 [address] [facility] 
[phone] Patient: Lisa Jimenez MRN: [id number] HTT:[id number] About your hospitalization You were admitted on:  April 29, 2018 You last received care in the:  [facility] 4M POST SURG ORT 2 You were discharged on:  May 3, 2018 Why you were hospitalized Your primary diagnosis was:  Abdominal Pain Your diagnoses also included:  Gerd (Gastroesophageal Reflux Disease), Dementia Without Behavioral Disturbance, Prediabetes, Sirs (Systemic Inflammatory Response Syndrome) (Hcc) Follow-up Information Follow up With Details Comments Contact Info Stu Angel MD Schedule an appointment as soon as possible for a visit For follow up after appendectomy [address] 
[phone] Dinesh Del Rio MD Go to Monday at 10:50 am Emmanuel Miller 906 70 [facility] 
[phone] Your Scheduled Appointments Monday May 07, 2018 10:50 AM EDT TRANSITIONAL CARE MANAGEMENT with Dinesh Del Rio MD  
[address] [place] [address] 70 [facility]  
[phone] Discharge Orders None A check rachael indicates which time of day the medication should be taken. My Medications START taking these medications Instructions Each Dose to Equal  
 Morning Noon Evening Bedtime  
 acetaminophen 325 mg tablet Commonly known as:  TYLENOL Your last dose was:  1:50 PM  
   
 Take 2 Tabs by mouth every six (6) hours as needed for Pain for up to 30 days. 650 mg  
    
   
   
   
  
 apixaban 2.5 mg tablet Commonly known as:  Pattricia Boom Start taking on:  5/4/2018 Take 1 Tab by mouth two (2) times a day. 2.5 mg  
    
   
   
   
  
 docusate sodium 100 mg capsule Commonly known as:  Drew Slater Your last dose was:  09:20 AM  
   
 Take 1 Cap by mouth two (2) times daily as needed for Constipation for up to 90 days. 100 mg Due  
   
  
 levoFLOXacin 750 mg tablet Commonly known as:  Jimmie Espinoza Your next dose is: Tonight Take 1 Tab by mouth daily for 9 days. 750 mg Due  
  
 metroNIDAZOLE 500 mg tablet Commonly known as:  FLAGYL Your last dose was: This afternoon Take 1 Tab by mouth three (3) times daily for 9 days. 500 mg  
    
   
   
   
  
 oxyCODONE IR 5 mg immediate release tablet Commonly known as:  Lisandro Melena Your last dose was:  5/2/18 at 4:00 PM  
   
 Take 1 Tab by mouth every four (4) hours as needed. Max Daily Amount: 30 mg.  
 5 mg CONTINUE taking these medications Instructions Each Dose to Equal  
 Morning Noon Evening Bedtime  
 cranberry extract 450 mg Tab tablet Take 450 mg by mouth daily. 450 mg Where to Get Your Medications Information on where to get these meds will be given to you by the nurse or doctor. ! Ask your nurse or doctor about these medications  
  acetaminophen 325 mg tablet  
 apixaban 2.5 mg tablet  
 docusate sodium 100 mg capsule  
 levoFLOXacin 750 mg tablet  
 metroNIDAZOLE 500 mg tablet  
 oxyCODONE IR 5 mg immediate release tablet Opioid Education Prescription Opioids: What You Need to Know: 
 
 
Admitting provider:  Olga Blair MD  - Family Medicine Resident Cosmo Michelle MD - Family Medicine Attending Marybel Gilbert . . . . . . . . . . . . . . . . . . . . . . . . . . . . . . . . . . . . . . . . . . . . . . . . . . . . . . . . . . . . . . . . . . . . . . Marybel Gilbert HPI:  
Ms. Valentina Lindquist is a 80 y.o.  female with a history of Dementia, GERD, UTI who presented to the Emergency Department today complaining abdominal pain. AP started suddenly this am, moderate, sharp, diffuse, no radiation, associated with subjective chills, nausea, vomiting x 1, generalized weakness motivating this ED visit. No diarrhea, took coffee and toast in am. Similar AP in the past on and off over last week, no clear association with meals. Patient was seen by PCP on 18, treated for Citrobacter UTI with cipro on  for 5 days. She denies dysuria or hematuria. Pt further denies fever, diarrhea, constipation, leg pain, leg swelling rash, chest pain, cough or shortness of breath. Allergies Allergen Reactions  Macrodantin [Nitrofurantoin Macrocrystalline] Rash  Pcn [Penicillins] Rash  Sulfa (Sulfonamide Antibiotics) Rash Past Medical History:  
Diagnosis Date  Alzheimer disease  Anxiety 2010  GERD (gastroesophageal reflux disease) 2010  Mild dementia  UTI (lower urinary tract infection) 2010 Past Surgical History:  
Procedure Laterality Date  ENDOSCOPY, COLON, DIAGNOSTIC    
 HX CATARACT REMOVAL  2011  
 left  HX UROLOGICAL    
 bladder uplift Family History Problem Relation Age of Onset  Cancer Father Social History Substance Use Topics  Smoking status: Never Smoker  Smokeless tobacco: Never Used  Alcohol use No  
 
 
Physical Examination Patient Vitals for the past 24 hrs: 
 BP Temp Pulse Resp SpO2 18 1139 128/71 97.8 °F (36.6 °C) 74 18 98 % 18 0723 137/72 98.6 °F (37 °C) 74 18 97 % 18 0702 - - 70 - -  
18 0435 127/58 97.9 °F (36.6 °C) 73 18 98 % 18 0014 136/60 98.6 °F (37 °C) 79 17 96 % 18 2126 - - 76 - -  
18 2000 133/66 97.9 °F (36.6 °C) 81 17 96 % 18 1641 134/69 97.8 °F (36.6 °C) 76 18 98 % Temp (24hrs), Av.1 °F (36.7 °C), Min:97.8 °F (36.6 °C), Max:98.6 °F (37 °C) Intake/Output Summary (Last 24 hours) at 18 1447 Last data filed at 18 1225 Gross per 24 hour Intake           2007.5 ml Output             1350 ml Net            657.5 ml  
  
O2 Flow Rate (L/min): 2 l/min O2 Device: Room air Visit Vitals  /71 (BP 1 Location: Right arm, BP Patient Position: At rest)  Pulse 74  Temp 97.8 °F (36.6 °C)  Resp 18  Ht 5' 3\" (1.6 m)  Wt 130 lb 8.2 oz (59.2 kg)  LMP 1995  SpO2 98%  BMI 23.12 kg/m2 General:  Alert, cooperative, no distress, appears stated age. Head:  Normocephalic, without obvious abnormality, atraumatic. Eyes:  Conjunctivae/corneas clear. PERRL, EOMs intact. Fundi benign. Ears:  Normal TMs and external ear canals both ears. Nose: Nares normal. Septum midline. Mucosa normal. No drainage or sinus tenderness. Throat: Lips, mucosa, and tongue normal. Teeth and gums normal.  
Neck: Supple, symmetrical, trachea midline, no adenopathy, thyroid: no enlargement/tenderness/nodules, no carotid bruit and no JVD. Back:   Symmetric, no curvature. ROM normal. No CVA tenderness. Lungs:   Clear to auscultation bilaterally. Chest wall:  No tenderness or deformity. Heart:  Regular rate and rhythm, S1, S2 normal, no murmur, click, rub or gallop. Breast Exam:  No tenderness, masses, or nipple abnormality. Abdomen:   Soft, TTP diffusely but improved from exam previously. Surgical site c/d/i Genitalia:  Normal female without lesion, discharge or tenderness. Rectal:  Normal tone,  no masses or tenderness Guaiac negative stool. Extremities: Extremities normal, atraumatic, no cyanosis or edema. Pulses: 2+ and symmetric all extremities. Skin: Skin color, texture, turgor normal. No rashes or lesions. Lymph nodes: Cervical, supraclavicular, and axillary nodes normal.  
Neurologic: CNII-XII intact. Normal strength, sensation and reflexes throughout. Data Review LABS: 
 
Recent Labs 05/03/18 
 0036 WBC  10.5 HGB  10.0* HCT  30.2* PLT  224 Recent Labs 05/03/18 
 0036 NA  136  
K  3.3*  
CL  106 CO2  19* GLU  120* BUN  11  
CREA  0.58  
CA  7.5* MG  2.0 PHOS  1.6* ALB  1.7* SGOT  16 ALT  7* No results for input(s): INR in the last 72 hours. No lab exists for component: INREXT, INREXT Cultures: Blood cx negative 4days, urine cx negative 1 day Lab Results Component Value Date/Time Specimen Description: URINE 01/18/2012 05:00 PM  
 Specimen Description: URINE 10/20/2010 11:35 AM  
 Specimen Description: URINE 08/31/2010 10:51 AM  
 
Lab Results Component Value Date/Time Culture result: NO GROWTH 1 DAY 04/30/2018 05:04 AM  
 Culture result: MRSA NOT PRESENT 04/30/2018 04:54 AM  
 Culture result:  04/30/2018 04:54 AM  
      Screening of patient nares for MRSA is for surveillance purposes and, if positive, to facilitate isolation considerations in high risk settings. It is not intended for automatic decolonization interventions per se as regimens are not sufficiently effective to warrant routine use. Admission Imaging: 
Ct Abd Pelv W Cont Addendum Date: 4/30/2018 Addendum: Addendum: The distal portion of the appendix demonstrates wall irregularity and hyperenhancement with surrounding fluid and fat stranding. There is a small amount of adjacent extraluminal air (series 601B, image 35; series 2, image 67).  Findings are suggestive of acute appendicitis with contained perforation. There is no evidence for abscess or drainable collection. There are a few foci of air within the urinary bladder, which can be seen from recent catheterization or infection. Correlate with urinalysis. The findings were discussed with the ICU by Dr. Go President on 4/30/2018 at 0940 hours. Result Date: 4/30/2018 EXAM:  CT ABD PELV W CONT INDICATION: Abdominal pain COMPARISON: Ultrasound from 2013 CONTRAST:  100 mL of Isovue-370. TECHNIQUE: Following the uneventful intravenous administration of contrast, thin axial images were obtained through the abdomen and pelvis. Coronal and sagittal reconstructions were generated. Oral contrast was not administered. CT dose reduction was achieved through use of a standardized protocol tailored for this examination and automatic exposure control for dose modulation. FINDINGS: LUNG BASES: Bibasilar subsegmental atelectasis. 5 mm micronodule in the right middle lobe (image 3). INCIDENTALLY IMAGED HEART AND MEDIASTINUM: Small pericardial effusion. Coronary artery calcification. LIVER: No mass or biliary dilatation. GALLBLADDER: Unremarkable. SPLEEN: No mass. PANCREAS: No mass or ductal dilatation. ADRENALS: Unremarkable. KIDNEYS: No mass, calculus, or hydronephrosis. STOMACH: Unremarkable. SMALL BOWEL: No dilatation or wall thickening. COLON: No dilatation or wall thickening. Multiple sigmoid diverticula. APPENDIX: Unremarkable. Axial image 70 PERITONEUM: Moderate amount of ascites associated with a thickened 7 cm segment of sigmoid. Multiple sigmoid diverticula (axial image 71 and coronal image 36). RETROPERITONEUM: No lymphadenopathy or aortic aneurysm. REPRODUCTIVE ORGANS: Small uterus URINARY BLADDER: No mass or calculus. BONES: No destructive bone lesion. ADDITIONAL COMMENTS: Disc desiccation at the lumbosacral junction.   
 
IMPRESSION: Small pericardial effusion Right middle lobe 5 mm micronodule, for which follow-up is needed Sigmoid diverticulosis Guidelines for Management of Incidental Pulmonary Nodules Detected on CT Images: from the Fleischner Society 2017 LOW-RISK PATIENTS: LESS THAN OR EQUAL TO 6 MM:  No routine follow-up needed. GREATER THAN 6-8 MM: CT at 6-12 months, if multiple at 3-6 months, then consider CT at 18-24 months. GREATER THAN 8 MM:  Consider CT at 3 months, PET/CT, or tissue sampling. If multiple CT at 3-6 months, then consider CT at 18-24 months. HIGH-RISK PATIENTS: LESS THAN OR EQUAL TO 6 MM:  Optional CT at 12 months. GREATER THAN 6-8 MM: CT at 6-12 months, if multiple at 3-6 months, then CT at 18-24 months. GREATER THAN 8 MM:  Consider CT at 3 months, PET/CT, or tissue sampling. If multiple CT at 3-6 months, then at 18-24 months. Guidelines for Management of Incidental Pulmonary Nodules Detected on CT : A Statement from the [address] [place] [address] 2017\"  Rere Tee. Radiology 2017; 000:1-16 [address] [place] [address] Result Date: 4/29/2018 INDICATION:   RUQ pain, fever, elev WBC, Wolf's sign COMPARISON:  CT earlier today FINDINGS: Limited right upper quadrant ultrasound was performed. The gallbladder is normal. There is no cholelithiasis, wall thickening, pericholecystic fluid, or sonographic Wolf's sign. The common bile duct is not dilated, and measures 5-6 mm. No free fluid right upper quadrant. IMPRESSION: No acute process. Xr Chest Liliana Hair Result Date: 4/29/2018 INDICATION:   Sepsis EXAM:  AP CHEST RADIOGRAPH COMPARISON: September 20, 2012 FINDINGS: AP portable view of the chest demonstrates a normal cardiomediastinal silhouette. The lungs are adequately expanded. There is no edema, effusion, consolidation, or pneumothorax. The osseous structures are unremarkable. IMPRESSION: No acute process.   
 
 
 
FINAL DIAGNOSES & HOSPITAL COURSE: 
Ms. Aletha Rock is a 80 y.o.  female with a history of Dementia, GERD, UTI who presented to the Emergency Department today complaining abdominal pain. AP started suddenly this am, moderate, sharp, diffuse, no radiation, associated with subjective chills, nausea, vomiting x 1, generalized weakness motivating this ED visit. No diarrhea, took coffee and toast in am. Similar AP in the past on and off over last week, no clear association with meals. Patient was seen by PCP on 4/05/18, treated for Citrobacter UTI with cipro on 4/13 for 5 days. She denies dysuria or hematuria. Pt further denies fever, diarrhea, constipation, leg pain, leg swelling rash, chest pain, cough or shortness of breath. Final discharge diagnoses and brief hospital course Sepsis 2/2 Performated Appendicitis s/p Laparoscopic repair on 4/30/18: Per Surgery, patient had peritonitis with purulent exudate covering peritoneum of RLQ, ileum, sigmoid colon. Patient improved clinically on daily basis with use of Dilaudid PRN and roxicodone PRN. Patient was treated with cefepime and flagyl and transitioned to levaquin and flagyl to complete total of 14 days of abx, thus patient discharged with levaquin 750mg daily X 9 days and flagyl 500mg Q8hrs X 9 days. At time of discharge, blood cx no growth 4 days and urine cx no growth 1 day. 
  
New onset A-fib: On 5/2 patient found to be in A-fib on telemetry monitoring, confirmed by EKG. Troponin negative. BNP elevated at 1757. ECHO showed EF 48-56% with basal mid-inferolateral wall hypokinesis. Cardiology consulted and recommended short acting diltiazem which was then discontinued after HR normalized. CHADs 2 Vasc score 3. Pt has 3.2% risk of CVA without anticoagulation. Will assess fall risk and discuss with family. At discharge, patient was started on eliquis for anticoagulation. Patient will follow up with cardiology.  
  
Urinary retention: Patient has known history of urinary retention.  Ryder was placed on admission and removed postop, however patient continued to have retention and required straight cath. Patient will continue to have straight cath as needed upon discharge to SNF. Hyponatremia resolved: mild, POA : 135. Patient given IVF. Sodium 136 at discharge. Hypomagnesemia: Magnesium low at 1.5 on 5/2/18. Monitored with daily CMP. Mg 2.0 at discharge.  
   
GERD: stable, not on any home meds 
   
Right middle lobe nodule: 5mm in size 
- outpatient follow up 
   
Dementia: Mild, stable not on any home meds 
-Palliative consulted. Family meeting on 5/2, decision made by family to make pt DNR 
 
FOLLOW-UP CARE RECOMMENDATIONS: 
 
Appointments Follow-up Information Follow up With Details Comments Contact Info Ayo Durand MD Schedule an appointment as soon as possible for a visit For follow up after appendectomy [address] 
[phone] Bernett Cranker, MD Go to Monday at 10:50 am [address] [facility] [address] 
[phone] It is very important that you keep follow-up appointment(s). Bring discharge papers, medication list (and/or medication bottles) to follow-up appointments for review by outpatient provider(s). FOLLOW-UP TESTS RECOMMENDED:  
· none ONGOING TREATMENT PLAN:  
 
-as stated above PENDING TEST RESULTS: 
At the time of discharge the following test results are still pending: none. Please review these results as they become available. Specific symptoms to watch for: chest pain, shortness of breath, fever, chills, nausea, vomiting, diarrhea, change in mentation, falling, weakness, bleeding. DIET:  GI Lite Diet ACTIVITY:  Activity as tolerated WOUND CARE: Keep wound clean and dry EQUIPMENT needed:  none INCIDENTAL FINDINGS:  none GOALS OF CARE: 
  Eventual return to home/independent/assisted living  
x  Long term SNF Hospice No rehospitalization Patient condition:  
Functional status Poor   
x  Deconditioned Independent Cognition Kristan Cowden Forgetful (some sensescence) x  Dementia Catheters/lines (plus indication) Ryder PICC   
  PEG   
X  May require intermittent straight cath Code status Full code   
x  DNR Susan Frank . . . . . . . . . . . . . . . . . . . . . . . . . . . . . . . . . . . . . . . . . . . . . . . . . . . . . . . . . . . . . . . . . . . . . . Susan Marie CHRONIC MEDICAL CONDITIONS: 
Problem List as of 5/3/2018  Date Reviewed: 4/11/2018 Codes Class Noted - Resolved Prediabetes ICD-10-CM: R73.03 
ICD-9-CM: 790.29  4/30/2018 - Present SIRS (systemic inflammatory response syndrome) (HCC) ICD-10-CM: R65.10 ICD-9-CM: 995.90  4/30/2018 - Present * (Principal)Abdominal pain ICD-10-CM: R10.9 ICD-9-CM: 789.00  4/29/2018 - Present Dementia without behavioral disturbance ICD-10-CM: F03.90 ICD-9-CM: 294.20  5/13/2015 - Present Memory loss ICD-10-CM: R41.3 ICD-9-CM: 780.93  5/13/2015 - Present Fatigue ICD-10-CM: R53.83 ICD-9-CM: 780.79  4/10/2015 - Present Urinary urgency ICD-10-CM: R39.15 ICD-9-CM: 788.63  9/27/2012 - Present Overview Signed 9/27/2012 11:00 AM by Pretty Bishop MD  
  consider seeing Dr. Arnulfo Worley if symptoms persist 
  
  
   
 Dizziness ICD-10-CM: W88 ICD-9-CM: 780.4  9/20/2012 - Present Overview Signed 9/20/2012  7:57 AM by Pretty Bishop MD  
  MRI brain 9/2012: Atrophy and white matter disease. No significant or acute  
findings. Nonspecific abnormal electrocardiogram (ECG) (EKG) ICD-10-CM: R94.31 
ICD-9-CM: 794.31  9/20/2012 - Present Anxiety ICD-10-CM: F41.9 ICD-9-CM: 300.00  4/23/2010 - Present GERD (gastroesophageal reflux disease) ICD-10-CM: K21.9 ICD-9-CM: 530.81  4/23/2010 - Present RESOLVED: UTI (lower urinary tract infection) ICD-10-CM: N39.0 ICD-9-CM: 599.0  4/23/2010 - 7/25/2017 Admission H&P and Discharge Medications Reviewed by: _____________________________________________ Justin Chau MD 
 
 
 
 
 
 
  
Abdominal Pain: Care Instructions Your Care Instructions Abdominal pain has many possible causes. Some aren't serious and get better on their own in a few days. Others need more testing and treatment. If your pain continues or gets worse, you need to be rechecked and may need more tests to find out what is wrong. You may need surgery to correct the problem. Don't ignore new symptoms, such as fever, nausea and vomiting, urination problems, pain that gets worse, and dizziness. These may be signs of a more serious problem. Your doctor may have recommended a follow-up visit in the next 8 to 12 hours. If you are not getting better, you may need more tests or treatment. The doctor has checked you carefully, but problems can develop later. If you notice any problems or new symptoms, get medical treatment right away. Follow-up care is a key part of your treatment and safety. Be sure to make and go to all appointments, and call your doctor if you are having problems. It's also a good idea to know your test results and keep a list of the medicines you take. How can you care for yourself at home? · Rest until you feel better. · To prevent dehydration, drink plenty of fluids, enough so that your urine is light yellow or clear like water. Choose water and other caffeine-free clear liquids until you feel better. If you have kidney, heart, or liver disease and have to limit fluids, talk with your doctor before you increase the amount of fluids you drink. · If your stomach is upset, eat mild foods, such as rice, dry toast or crackers, bananas, and applesauce. Try eating several small meals instead of two or three large ones. · Wait until 48 hours after all symptoms have gone away before you have spicy foods, alcohol, and drinks that contain caffeine. · Do not eat foods that are high in fat. · Avoid anti-inflammatory medicines such as aspirin, ibuprofen (Advil, Motrin), and naproxen (Aleve). These can cause stomach upset. Talk to your doctor if you take daily aspirin for another health problem. When should you call for help? Call 911 anytime you think you may need emergency care. For example, call if: 
? · You passed out (lost consciousness). ? · You pass maroon or very bloody stools. ? · You vomit blood or what looks like coffee grounds. ? · You have new, severe belly pain. ?Call your doctor now or seek immediate medical care if: 
? · Your pain gets worse, especially if it becomes focused in one area of your belly. ? · You have a new or higher fever. ? · Your stools are black and look like tar, or they have streaks of blood. ? · You have unexpected vaginal bleeding. ? · You have symptoms of a urinary tract infection. These may include: 
¨ Pain when you urinate. ¨ Urinating more often than usual. 
¨ Blood in your urine. ? · You are dizzy or lightheaded, or you feel like you may faint. ? Watch closely for changes in your health, and be sure to contact your doctor if: 
? · You are not getting better after 1 day (24 hours). Where can you learn more? Go to http://kim-holly.info/. Enter X997 in the search box to learn more about \"Abdominal Pain: Care Instructions. \" Current as of: March 20, 2017 Content Version: 11.4 © 8547-1952 [employer]. Care instructions adapted under license by "[employer] (which disclaims liability or warranty for this information). If you have questions about a medical condition or this instruction, always ask your healthcare professional. [place] [address] any warranty or liability for your use of this information. Dementia: Care Instructions Your Care Instructions Dementia is a loss of mental skills that affects your daily life.  It is different than the occasional trouble with memory that is part of aging. You may find it hard to remember things that you feel you should be able to remember. Or you may feel that your mind is just not working as well as usual. 
Finding out that you have dementia is a shock. You may be afraid and worried about how the condition will change your life. Although there is no cure at this time, medicine may slow memory loss and improve thinking for a while. Other medicines may be able to help you sleep or cope with depression and behavior changes. Dementia often gets worse slowly. But it can get worse quickly. As dementia gets worse, it may become harder to do common things that take planning, like making a list and going shopping. Over time, the disease may make it hard for you to take care of yourself. Some people with dementia need others to help care for them. Dementia is different for everyone. You may be able to function well for a long time. In the early stage of the condition, you can do things at home to make life easier and safer. You also can keep doing your hobbies and other activities. Many people find comfort in planning now for their future needs. Follow-up care is a key part of your treatment and safety. Be sure to make and go to all appointments, and call your doctor if you are having problems. It's also a good idea to know your test results and keep a list of the medicines you take. How can you care for yourself at home? · Take your medicines exactly as prescribed. Call your doctor if you think you are having a problem with your medicine. · Eat healthy foods. Eat lots of whole grains, fruits, and vegetables every day. If you are not hungry, try snacks or nutritional drinks such as Boost, Ensure, or Sustacal. 
· If you have problems sleeping: ¨ Try not to nap too close to your bedtime. ¨ Exercise regularly. Walking is a good choice. ¨ Try a glass of warm milk or caffeine-free herbal tea before bed. · Do tasks and activities during the time of day when you feel your best. It may help to develop a daily routine. · Post labels, lists, and sticky notes to help you remember things. Write your activities on a calendar you can easily find. Put your clock where you can easily see it. · Stay active. Take walks in familiar places, or with friends or loved ones. Try to stay active mentally too. Read and work crossword puzzles if you enjoy these activities. · Do not drive unless you can pass an on-road driving test. If you are not sure if you are safe to drive, your state 's license bureau can test you. · Keep a cordless phone and a flashlight with new batteries by your bed. If possible, put a phone in each of the main rooms of your house, or carry a cell phone in case you fall and cannot reach a phone. Or, you can wear a device around your neck or wrist. You push a button that sends a signal for help. Acknowledge your emotions and plan for the future · Talk openly and honestly with your doctor. · Let yourself grieve. It is common to feel angry, scared, frustrated, anxious, or depressed. · Get emotional support from family, friends, a support group, or a counselor experienced in working with people who have dementia. · Ask for help if you need it. · Plan for the future. ¨ Talk to your family and doctor about preparing a living will and other important papers while you can make decisions. These papers tell your doctors how to care for you at the end of your life. ¨ Consider naming a person to make decisions about your care if you are not able to. When should you call for help? Call 911 anytime you think you may need emergency care. For example, call if: 
? · You are lost and do not know whom to call. ? · You are injured and do not know whom to call. ?Call your doctor now or seek immediate medical care if: ? · You are more confused or upset than usual.  
? · You feel like you could hurt yourself because your mind is not working well. ? Watch closely for changes in your health, and be sure to contact your doctor if you have any problems. Where can you learn more? Go to http://kim-holly.info/. Enter C814 in the search box to learn more about \"Dementia: Care Instructions. \" Current as of: May 12, 2017 Content Version: 11.4 © 1250-0782 [employer]. Care instructions adapted under license by [employer] (which disclaims liability or warranty for this information). If you have questions about a medical condition or this instruction, always ask your healthcare professional. [address] any warranty or liability for your use of this information. .[facility] ACO Transitions of Care Introducing [employer] offers a voluntary care coordination program to provide high quality service and care to [facility] fee-for-service beneficiaries. Nelson Rincon was designed to help you enhance your health and well-being through the following services: ? Transitions of Care  support for individuals who are transitioning from one care setting to another (example: Hospital to home). ? Chronic and Complex Care Coordination  support for individuals and caregivers of those with serious or chronic illnesses or with more than one chronic (ongoing) condition and those who take a number of different medications. If you meet specific medical criteria, a [facility] [address] may call you directly to coordinate your care with your primary care physician and your other care providers. For questions about the [facility] programs, please, contact your physicians office. For general questions or additional information about Accountable Care Organizations: Please visit www.medicare.gov/acos. html or call 1-800-MEDICARE (0-936.473.9273) TTY users should call 5-386.298.1288. [employer] Announcement We are excited to announce that we are making your provider's discharge notes available to you in [employer]. You will see these notes when they are completed and signed by the physician that discharged you from your recent hospital stay. If you have any questions or concerns about any information you see in [employer], please call the Health Information Department where you were seen or reach out to your Primary Care Provider for more information about your plan of care. Introducing [facility]! [employer] introduces [employer] patient portal. Now you can access parts of your medical record, email your doctor's office, and request medication refills online. 1. In your internet browser, go to [email or web address] 2. Click on the First Time User? Click Here link in the Sign In box. You will see the New Member Sign Up page. 3. Enter your [employer] Access Code exactly as it appears below. You will not need to use this code after youve completed the sign-up process. If you do not sign up before the expiration date, you must request a new code. · [employer] Access Code: [email or web address] Expires: 7/10/2018  9:37 PM 
 
4. Enter the last four digits of your Social Security Number (xxxx) and Date of Birth (mm/dd/yyyy) as indicated and click Submit. You will be taken to the next sign-up page. 5. Create a [employer] ID. This will be your [employer] login ID and cannot be changed, so think of one that is secure and easy to remember. 6. Create a [employer] password. You can change your password at any time. 7. Enter your Password Reset Question and Answer. This can be used at a later time if you forget your password. 8. Enter your e-mail address. You will receive e-mail notification when new information is available in [address] 9. Click Sign Up. You can now view and download portions of your medical record. 10. Click the Download Summary menu link to download a portable copy of your medical information. If you have questions, please visit the Frequently Asked Questions section of the [employer] website. Remember, [employer] is NOT to be used for urgent needs. For medical emergencies, dial 911. Now available from your iPhone and Android! Introducing Anjel Stacy As a [facility] patient, I wanted to make you aware of our electronic visit tool called Anjel Tawanna. [employer] allows you to connect within minutes with a medical provider 24 hours a day, seven days a week via a mobile device or tablet or logging into a secure website from your computer. You can access Anjel Tawanna from anywhere in the [place]. A virtual visit might be right for you when you have a simple condition and feel like you just dont want to get out of bed, or cant get away from work for an appointment, when your regular [facility] provider is not available (evenings, weekends or holidays), or when youre out of town and need minor care. Electronic visits cost only $49 and if the [employer] provider determines a prescription is needed to treat your condition, one can be electronically transmitted to a nearby pharmacy*. Please take a moment to enroll today if you have not already done so. The enrollment process is free and takes just a few minutes. To enroll, please download the [employer] 24/7 thanh to your tablet or phone, or visit [email or web address] to enroll on your computer. And, as an [address] [place] [address] patient with a [employer] account, the results of your visits will be scanned into your electronic medical record and your primary care provider will be able to view the scanned results.    
We urge you to continue to see your regular [facility] provider for your ongoing medical care. And while your primary care provider may not be the one available when you seek a [facility] virtual visit, the peace of mind you get from getting a real diagnosis real time can be priceless. For more information on [facility], view our Frequently Asked Questions (FAQs) at [email or web address]. Sincerely, 
 
Kati Myrick MD 
Chief Medical Officer Jenifer Melchor *:  certain medications cannot be prescribed via [facility] Unresulted Labs-Please follow up with your PCP about these lab tests Order Current Status CULTURE, BLOOD Preliminary result CULTURE, BLOOD Preliminary result Last Palliative Care Discharge Note 05/03/18 1314  Version 1 of 1 Goals of Care/Treatment Preferences The Palliative Medicine team was consulted as part of your/your loved one's care in the hospital. Our team is a supportive service; we strive to relieve suffering and improve quality of life. You met with Nurse Practitioner [place] and  Natasha Olea. We reviewed advance care planning information, which includes the following: 
Patient's [address] is[de-identified] Verbal statement (Legal Next of Kin remains as decision maker) Primary Decision Maker Name: Fabiana Hartmann Primary Decision Maker Phone Number: [phone] Primary Decision Maker Relationship to Patient: Spouse Confirm Advance Directive: Yes, not on file Patient Would Like to Complete Advance Directive: Unable Does the patient have other document types: Do Not Resuscitate Patient/Health Care Proxy Stated Goals: Rehabilitation We reviewed / discussed your code status as: DNR 
   Full Code means perform CPR in the event of cardiac arrest. 
    DNR means do NOT perform CPR in the event of cardiac arrest. 
    Partial Code means you have specific preferences, please discuss with your healthcare team. 
 No Order means this issue was not addressed / resolved during your stay Medical Interventions: Limited additional interventions Other Instructions: While in the hospital, a Durable Do Not Resuscitate Order was completed on your behalf. This form should travel with you and should be placed on your chart at the [facility]. Once you return home, it is recommended that this form be placed in a visible location such as on the refrigerator or bedroom door. Artificially Administered Nutrition:  (not addressed) Because of the importance of this information, we are providing you with a printed copy to share with other healthcare providers after this hospitalization is complete. Providers Seen During Your Hospitalization Provider Specialty Primary office phone Farnaz Mujica. Vito Ferrer, [address] [place] [address] Emergency Medicine [phone] Deysi Conteh DO [facility] [phone] Your Primary Care Physician (PCP) Primary Care Physician Office Phone Office Fax Ronen Green [phone] You are allergic to the following Allergen Reactions Macrodantin (Nitrofurantoin Macrocrystalline) Rash Pcn (Penicillins) Rash  
    
 Sulfa (Sulfonamide Antibiotics) Rash Recent Documentation Height Weight BMI OB Status Smoking Status 1.6 m 59.2 kg 23.12 kg/m2 Postmenopausal Never Smoker Emergency Contacts Name Discharge Info Relation Home Work Mobile [address] CAREGIVER [3] Spouse [3] [phone] Patient Belongings The following personal items are in your possession at time of discharge: 
  Dental Appliances: Uppers, Lowers  Visual Aid: Glasses, At bedside, With patient   Hearing Aids/Status: Bilateral, With patient  Home Medications: None   Jewelry: None  Clothing: At bedside    Other Valuables: Eyeglasses Please provide this summary of care documentation to your next provider. Signatures-by signing, you are acknowledging that this After Visit Summary has been reviewed with you and you have received a copy. Patient Signature:  ____________________________________________________________ Date:  ____________________________________________________________  
  
Sissy Lapine Provider Signature:  ____________________________________________________________ Date:  ____________________________________________________________

## 2018-04-30 PROBLEM — R73.03 PREDIABETES: Status: ACTIVE | Noted: 2018-01-01

## 2018-04-30 PROBLEM — R65.10 SIRS (SYSTEMIC INFLAMMATORY RESPONSE SYNDROME) (HCC): Status: ACTIVE | Noted: 2018-01-01

## 2018-04-30 NOTE — ANESTHESIA PREPROCEDURE EVALUATION
Anesthetic History   No history of anesthetic complications            Review of Systems / Medical History  Patient summary reviewed, nursing notes reviewed and pertinent labs reviewed    Pulmonary  Within defined limits                 Neuro/Psych         Dementia    Comments: Moderate to severe dementia Cardiovascular  Within defined limits                Exercise tolerance: >4 METS     GI/Hepatic/Renal     GERD: well controlled           Endo/Other  Within defined limits           Other Findings   Comments: Ruptured appendix: sepsis protocol           Physical Exam    Airway  Mallampati: III    Neck ROM: normal range of motion   Mouth opening: Diminished (comment)     Cardiovascular    Rhythm: regular  Rate: normal         Dental  No notable dental hx    Comments: Per patient and cooperation   Pulmonary  Breath sounds clear to auscultation               Abdominal         Other Findings            Anesthetic Plan    ASA: 2  Anesthesia type: general          Induction: Intravenous  Anesthetic plan and risks discussed with: Spouse      Informed consent obtained with Bonifacio Guzman via phone call.

## 2018-04-30 NOTE — H&P
[address] [facility] [address] [place] [address]   Office [phone], Fax [phone]      History & Physical    Date of admission: 4/29/2018    Patient name: Gavino Gunn  MRN: [id number]  YOB: 1934  Age: 80 y.o. Primary care provider:  Caty Goss MD     Source of Information: patient and medical records                                      Subjective:       Chief complain: Abdominal pain    History of present illness:    Ms. Mikey Espinoza is a 80 y.o.  female with a history of Dementia, GERD, UTI who presented to the Emergency Department today complaining abdominal pain. AP started suddenly this am, moderate, sharp, diffuse, no radiation, associated with subjective chills, nausea, vomiting x 1, generalized weakness motivating this ED visit. No diarrhea, took coffee and toast in am. Similar AP in the past on and off over last week, no clear association with meals. Patient was seen by PCP on 4/05/18, treated for Citrobacter UTI with cipro on 4/13 for 5 days. She denies dysuria or hematuria. Pt further denies fever, diarrhea, constipation, leg pain, leg swelling rash, chest pain, cough or shortness of breath.            Emergency Room Course:   Patient Vitals for the past 12 hrs:   Temp Pulse Resp BP SpO2   04/29/18 2015 - (!) 101 29 160/72 95 %   04/29/18 1951 - (!) 102 24 151/80 95 %   04/29/18 1800 - (!) 107 23 95/53 95 %   04/29/18 1745 - 100 (!) 33 (!) 131/99 96 %   04/29/18 1730 - 100 22 (!) 133/92 94 %   04/29/18 1713 99.1 °F (37.3 °C) 95 28 152/74 94 %      Labs: remarkable for WBC: 19.2 LA: 3.1, U/A no bacteria,  Lipase: 51, cr. 0.93   CXR  negative  CT abdo:  Sigmoid diverticulosis  US abdo: neg      Pt was treated with 30cc/kg NS, cefepime       Allergies   Allergen Reactions    Macrodantin [Nitrofurantoin Macrocrystalline] Rash    Pcn [Penicillins] Rash    Sulfa (Sulfonamide Antibiotics) Rash       Prior to Admission Medications   Prescriptions Last Dose Informant Patient Reported? Taking?   cranberry extract 450 mg tab tablet 4/29/2018 at AM  Yes Yes   Sig: Take 450 mg by mouth daily. Facility-Administered Medications: None       Past Medical History:   Diagnosis Date    Anxiety 4/23/2010    GERD (gastroesophageal reflux disease) 4/23/2010    Mild dementia     UTI (lower urinary tract infection) 4/23/2010        Past Surgical History:   Procedure Laterality Date    ENDOSCOPY, COLON, DIAGNOSTIC      HX CATARACT REMOVAL  5/2011    left    HX UROLOGICAL      bladder uplift          SOCIAL HISTORY    - Alcohol history: Not at all    - Smoking history: Non-smoker    - Illicit drug history: Not at all    - Living arrangement: patient lives with their family. - Ambulates: Independently       Preventive history:  Immunization History   Administered Date(s) Administered    Influenza High Dose Vaccine PF 11/15/2016    Influenza Vaccine 12/08/2017    Pneumococcal Conjugate (PCV-13) 11/30/2016    Tdap 11/15/2016       CODE STATUS discussed with the patient/caregivers  FULL CODE      The following are negative unless bolded. General Fever, chills and unexpected weight change. HENT Ear pain, sinus pressure and sore throat. Eyes Visual disturbance. Respiratory Cough, chest tightness, shortness of breath and wheezing. Cardio Chest pain, palpitations and leg swelling. GI Nausea, vomiting, abd pain, diarrhea, constipation and blood in stool.  Dysuria. Extremities Myalgias and arthralgias. Skin Color change and pallor. Neuro Weakness and headaches. Behavioral Confusion, nervous, anxious. The remainder of the review of systems was reviewed and is noncontributory.     Objective:    Patient Vitals for the past 12 hrs:   BP Temp Pulse Resp SpO2 Height Weight   04/29/18 2015 160/72 - (!) 101 29 95 % - -   04/29/18 1958 - - - - - - 130 lb (59 kg)   04/29/18 1951 151/80 - (!) 102 24 95 % - -   04/29/18 1800 95/53 - (!) 107 23 95 % - -   04/29/18 1745 (!) 131/99 - 100 (!) 33 96 % - -   18 1730 (!) 133/92 - 100 22 94 % - -   18 1713 152/74 99.1 °F (37.3 °C) 95 28 94 % 5' 3\" (1.6 m) -     Temp (24hrs), Av.1 °F (37.3 °C), Min:99.1 °F (37.3 °C), Max:99.1 °F (37.3 °C)      Intake/Output Summary (Last 24 hours) at 18  Last data filed at 18 1821   Gross per 24 hour   Intake                0 ml   Output              600 ml   Net             -600 ml          O2 Device: Room air      Physical Exam  Visit Vitals    /72    Pulse (!) 101    Temp 99.1 °F (37.3 °C)    Resp 29    Ht 5' 3\" (1.6 m)    Wt 130 lb (59 kg)    LMP 1995    SpO2 95%    BMI 23.03 kg/m2       Vitals Reviewed. General Oriented to person, place,and well-developed. Appears well-nourished, no distress. Not diaphoretic. HENT Head Normocephalic and atraumatic. Eyes Conjunctivae are normal, no discharge. No scleral icterus. Nose Nose normal, clear turbinates. Oral Oropharynx is clear and moist. No oropharyngeal exudate. Neck No thyromegaly present. No cervical adenopathy. Cardio Tachycardic, regular rhythm. Exam reveals no gallop and no friction rub. No murmur heard. No chest wall tenderness. Pulmonary Tachypneic, No respiratory distress. No wheezes, bibasilar fine rales. Abdominal Soft. Bowel sounds normal. No distension. severe tenderness with periumbilical guarding     Deferred. Extremities No edema of lower extremities. No tenderness. Distal pulses intact. Neurological Alert and oriented to person, place, and not to time. No focal deficit   Dermatology Skin is warm and dry. No rash noted. No erythema or pallor. Psychiatric Affect normal, mild dementia.         Data Review    Laboratory Data  Recent Results (from the past 8 hour(s))   CBC WITH AUTOMATED DIFF    Collection Time: 18  5:18 PM   Result Value Ref Range    WBC 19.2 (H) 3.6 - 11.0 K/uL    RBC 4.86 3.80 - 5.20 M/uL    HGB 14.4 11.5 - 16.0 g/dL HCT 43.3 35.0 - 47.0 %    MCV 89.1 80.0 - 99.0 FL    MCH 29.6 26.0 - 34.0 PG    MCHC 33.3 30.0 - 36.5 g/dL    RDW 12.6 11.5 - 14.5 %    PLATELET 736 714 - 248 K/uL    MPV 11.0 8.9 - 12.9 FL    NRBC 0.0 0  WBC    ABSOLUTE NRBC 0.00 0.00 - 0.01 K/uL    NEUTROPHILS 88 (H) 32 - 75 %    LYMPHOCYTES 5 (L) 12 - 49 %    MONOCYTES 6 5 - 13 %    EOSINOPHILS 0 0 - 7 %    BASOPHILS 0 0 - 1 %    IMMATURE GRANULOCYTES 1 (H) 0.0 - 0.5 %    ABS. NEUTROPHILS 16.9 (H) 1.8 - 8.0 K/UL    ABS. LYMPHOCYTES 1.0 0.8 - 3.5 K/UL    ABS. MONOCYTES 1.2 (H) 0.0 - 1.0 K/UL    ABS. EOSINOPHILS 0.0 0.0 - 0.4 K/UL    ABS. BASOPHILS 0.0 0.0 - 0.1 K/UL    ABS. IMM. GRANS. 0.1 (H) 0.00 - 0.04 K/UL    DF AUTOMATED     METABOLIC PANEL, COMPREHENSIVE    Collection Time: 04/29/18  5:18 PM   Result Value Ref Range    Sodium 135 (L) 136 - 145 mmol/L    Potassium 3.7 3.5 - 5.1 mmol/L    Chloride 99 97 - 108 mmol/L    CO2 25 21 - 32 mmol/L    Anion gap 11 5 - 15 mmol/L    Glucose 187 (H) 65 - 100 mg/dL    BUN 7 6 - 20 MG/DL    Creatinine 0.93 0.55 - 1.02 MG/DL    BUN/Creatinine ratio 8 (L) 12 - 20      GFR est AA >60 >60 ml/min/1.73m2    GFR est non-AA 58 (L) >60 ml/min/1.73m2    Calcium 9.5 8.5 - 10.1 MG/DL    Bilirubin, total 0.8 0.2 - 1.0 MG/DL    ALT (SGPT) 24 12 - 78 U/L    AST (SGOT) 19 15 - 37 U/L    Alk.  phosphatase 81 45 - 117 U/L    Protein, total 7.7 6.4 - 8.2 g/dL    Albumin 3.6 3.5 - 5.0 g/dL    Globulin 4.1 (H) 2.0 - 4.0 g/dL    A-G Ratio 0.9 (L) 1.1 - 2.2     LIPASE    Collection Time: 04/29/18  5:18 PM   Result Value Ref Range    Lipase 51 (L) 73 - 393 U/L   URINALYSIS W/MICROSCOPIC    Collection Time: 04/29/18  6:10 PM   Result Value Ref Range    Color YELLOW/STRAW      Appearance CLEAR CLEAR      Specific gravity 1.012 1.003 - 1.030      pH (UA) 6.5 5.0 - 8.0      Protein NEGATIVE  NEG mg/dL    Glucose NEGATIVE  NEG mg/dL    Ketone NEGATIVE  NEG mg/dL    Bilirubin NEGATIVE  NEG      Blood NEGATIVE  NEG      Urobilinogen 0.2 0.2 - 1.0 EU/dL    Nitrites NEGATIVE  NEG      Leukocyte Esterase NEGATIVE  NEG      WBC 5-10 0 - 4 /hpf    RBC 0-5 0 - 5 /hpf    Epithelial cells FEW FEW /lpf    Bacteria NEGATIVE  NEG /hpf   URINE CULTURE HOLD SAMPLE    Collection Time: 04/29/18  6:10 PM   Result Value Ref Range    Urine culture hold        URINE ON HOLD IN MICROBIOLOGY DEPT FOR 3 DAYS. IF UNPRESERVED URINE IS SUBMITTED, IT CANNOT BE USED FOR ADDITIONAL TESTING AFTER 24 HRS, RECOLLECTION WILL BE REQUIRED. LACTIC ACID    Collection Time: 04/29/18  6:10 PM   Result Value Ref Range    Lactic acid 3.1 (HH) 0.4 - 2.0 MMOL/L   LACTIC ACID    Collection Time: 04/29/18  8:19 PM   Result Value Ref Range    Lactic acid 2.8 (HH) 0.4 - 2.0 MMOL/L       Imaging  CXR Results  (Last 48 hours)               04/29/18 2007  XR CHEST PORT Final result    Impression:  IMPRESSION:   No acute process. Narrative:  INDICATION:   Sepsis       EXAM:  AP CHEST RADIOGRAPH       COMPARISON: September 20, 2012       FINDINGS:       AP portable view of the chest demonstrates a normal cardiomediastinal   silhouette. The lungs are adequately expanded. There is no edema, effusion,   consolidation, or pneumothorax. The osseous structures are unremarkable. CT Results  (Last 48 hours)               04/29/18 1819  CT ABD PELV W CONT Final result    Impression:  IMPRESSION:   Small pericardial effusion   Right middle lobe 5 mm micronodule, for which follow-up is needed   Sigmoid diverticulosis       Guidelines for Management of Incidental Pulmonary Nodules Detected on CT Images:   from the Fleischner Society 2017       LOW-RISK PATIENTS:       LESS THAN OR EQUAL TO 6 MM:  No routine follow-up needed. GREATER THAN 6-8 MM: CT at 6-12 months, if multiple at 3-6 months, then consider   CT at 18-24 months. GREATER THAN 8 MM:  Consider CT at 3 months, PET/CT, or tissue sampling. If   multiple CT at 3-6 months, then consider CT at 18-24 months.            HIGH-RISK PATIENTS:       LESS THAN OR EQUAL TO 6 MM:  Optional CT at 12 months. GREATER THAN 6-8 MM: CT at 6-12 months, if multiple at 3-6 months, then CT at   18-24 months. GREATER THAN 8 MM:  Consider CT at 3 months, PET/CT, or tissue sampling. If   multiple CT at 3-6 months, then at 18-24 months. Guidelines for Management of Incidental Pulmonary Nodules Detected on CT : A   Statement from the [address] [place] [address] 2017\"  Fox Olmos. Radiology   2017; 000:1-16                   Narrative:  EXAM:  CT ABD PELV W CONT       INDICATION: Abdominal pain       COMPARISON: Ultrasound from 2013       CONTRAST:  100 mL of Isovue-370. TECHNIQUE:    Following the uneventful intravenous administration of contrast, thin axial   images were obtained through the abdomen and pelvis. Coronal and sagittal   reconstructions were generated. Oral contrast was not administered. CT dose   reduction was achieved through use of a standardized protocol tailored for this   examination and automatic exposure control for dose modulation. FINDINGS:    LUNG BASES: Bibasilar subsegmental atelectasis. 5 mm micronodule in the right   middle lobe (image 3). INCIDENTALLY IMAGED HEART AND MEDIASTINUM: Small pericardial effusion. Coronary   artery calcification. LIVER: No mass or biliary dilatation. GALLBLADDER: Unremarkable. SPLEEN: No mass. PANCREAS: No mass or ductal dilatation. ADRENALS: Unremarkable. KIDNEYS: No mass, calculus, or hydronephrosis. STOMACH: Unremarkable. SMALL BOWEL: No dilatation or wall thickening. COLON: No dilatation or wall thickening. Multiple sigmoid diverticula. APPENDIX: Unremarkable. Axial image 70   PERITONEUM: Moderate amount of ascites associated with a thickened 7 cm segment   of sigmoid. Multiple sigmoid diverticula (axial image 71 and coronal image 36). RETROPERITONEUM: No lymphadenopathy or aortic aneurysm.    REPRODUCTIVE ORGANS: Small uterus   URINARY BLADDER: No mass or calculus. BONES: No destructive bone lesion. ADDITIONAL COMMENTS: Disc desiccation at the lumbosacral junction. EKG: NSR with PACs      Assessment and Plan     Neri Albert is a 80 y.o. female who is admitted for Abdominal pain and SIRS. -Acute Abdominal pain with sepsis:  POA 4/4 SIRS criteria met (WBC 19.2, , RR 33, Tmax 101.1). LA; 3.1-> 2.8 infectious sources likely diverticulitis  vs acute mesenteric ischemia. CTA shows bibasilar subsegmental atelectasis, 7 cm thickened sigmoid and moderate amount of ascites ( H/o coronary calcifications). Completed UTI treatment 10 days ago with Cipro. Pneumonia unlikely ( no cough, atelectasis may be 2/2 ascites/ peritoneal reaction)  - Admit to  ICU  - Vital signs per unit protocol  - IV Fluids: Received 30cc/kg in ER. Continue MIVF to maintain a MAP >65  - IV Antibiotics: Broad spectrum Cefepime, Metro and Levaquin (PCN allergy?)  -  CBC, CMP, blood cultures, Ucx, LA q4  - risk factor assessment: lipid panel, A1c,   - serial abdominal exam  - Stat consults: Spoke with Dr. Charanjit Prabhakar: no therapeutic anticoagulation needed. ABG and LA in am   - NPO, strict I/O, Zofran prn, dilaudid prn    Hyponatremia: mild, POA : 135  - IVF  -CMP    GERD: stable, not on any home meds    Right middle lobe nodule: 5mm in size  - outpatient follow up    Dementia: Mild, stable not on any home meds    FEN/GI - Regular diet. NS at 100 mL/hr. Activity - Ambulate with assistance  DVT prophylaxis - Lovenox  GI prophylaxis - Not indicated at this time  Disposition - Admit to Medical. Plan to d/c to Home. Code Status - Full, discussed with patient / caregivers. Patient Neri Albert to be discussed Dr. Cosmo Michelle (Attending Physician). 9:06 PM, 04/29/18  Zach Jefferson MD  Family Medicine Resident    For Billing   Chief Complaint   Patient presents with    Lethargy    Abdominal Pain       Hospital Problems  Date Reviewed: 4/11/2018    None

## 2018-04-30 NOTE — CONSULTS
[facility]     Name: Lin Malone MRN: [id number]   : 1934 Hospital: [address]   Date: 2018        Impression Plan   1. Acute appendicitis with contained perforation  2. Sepsis  3. Pulmonary nodule               · Surgery has evaluated, planned to take to the OR later today  · Continue broad spectrum abx  · Follow-up cultures  · Pain control  · Can follow-up nodule as an outpatient if the family is interested in serially imaging  · PPI  · NPO       Radiology  ( personally reviewed) CT abdomen pelvis with a 5mm nodule in the RML   ABG No results for input(s): PHI, PO2I, PCO2I in the last 72 hours. Subjective     Patient is a 80 y.o. female with a history of dementia and GERD who presented with abdominal pain. Pain started suddenly yesterday and was described as sharp and diffuse. Had chills, n/v, and generalized weakness. Continues to have pain, describes it differently at times. Tells me it is all over, but at times localizes it to her RLQ. Has been febrile to 101.1. CT abd/pelvis initially read negative, but up re-review is now being consistent with acute appendicitis with contained perforation. Review of Systems:  A comprehensive review of systems was negative except for that written in the HPI. Past Medical History:   Diagnosis Date    Alzheimer disease     Anxiety 2010    GERD (gastroesophageal reflux disease) 2010    Mild dementia     UTI (lower urinary tract infection) 2010      Past Surgical History:   Procedure Laterality Date    ENDOSCOPY, COLON, DIAGNOSTIC      HX CATARACT REMOVAL  2011    left    HX UROLOGICAL      bladder uplift      Prior to Admission medications    Medication Sig Start Date End Date Taking? Authorizing Provider   cranberry extract 450 mg tab tablet Take 450 mg by mouth daily.    Yes Historical Provider     Current Facility-Administered Medications   Medication Dose Route Frequency    pantoprazole (PROTONIX) 40 mg in sodium chloride 0.9% 10 mL injection  40 mg IntraVENous Q12H    cefepime (MAXIPIME) 2 g in 0.9% sodium chloride (MBP/ADV) 100 mL  2 g IntraVENous Q8H    sodium chloride (NS) flush 5-10 mL  5-10 mL IntraVENous Q8H    0.9% sodium chloride infusion  100 mL/hr IntraVENous CONTINUOUS    metroNIDAZOLE (FLAGYL) IVPB premix 500 mg  500 mg IntraVENous Q12H     Allergies   Allergen Reactions    Macrodantin [Nitrofurantoin Macrocrystalline] Rash    Pcn [Penicillins] Rash    Sulfa (Sulfonamide Antibiotics) Rash      Social History   Substance Use Topics    Smoking status: Never Smoker    Smokeless tobacco: Never Used    Alcohol use No      Family History   Problem Relation Age of Onset    Cancer Father           Laboratory: I have personally reviewed the critical care flowsheet and labs.      Recent Labs      04/30/18   0454  04/30/18   0006  04/29/18   1718   WBC   --   14.6*  19.2*   HGB  12.7  12.8  14.4   HCT  39.2  39.2  43.3   PLT   --   232  281     Recent Labs      04/30/18   0006  04/29/18   1718   NA  135*  135*   K  3.6  3.7   CL  102  99   CO2  25  25   GLU  190*  187*   BUN  7  7   CREA  0.88  0.93   CA  8.3*  9.5   MG  1.6   --    PHOS   --   3.1   ALB  2.9*  3.6   SGOT  14*  19   ALT  21  24       Objective:     Mode Rate Tidal Volume Pressure FiO2 PEEP                    Vital Signs:     TMAX(24)      Intake/Output:   Last shift:         Last 3 shifts:  [id number]  Intake/Output Summary (Last 24 hours) at 04/30/18 1000  Last data filed at 04/30/18 0600   Gross per 24 hour   Intake             1180 ml   Output             1400 ml   Net             -220 ml     EXAM:   GENERAL: well developed and in no distress, HEENT:  PERRL, EOMI, no alar flaring or epistaxis, oral mucosa moist without cyanosis, NECK:  no jugular vein distention, no retractions, no thyromegaly or masses, LUNGS: CTAB, respirations non-labored , HEART:  Regular rate and rhythm with no MGR; no edema is present, ABDOMEN:  Soft, diffuse tenderness to light touch, no rebound, EXTREMITIES:  warm with no cyanosis, SKIN:  no jaundice or ecchymosis and NEUROLOGIC:  alert and oriented, grossly non-focal    Anthony MD Linette  [facility]

## 2018-04-30 NOTE — PROGRESS NOTES
Spiritual Care Assessment/Progress Note  [address]      NAME: Neil Metz      MRN: [id number]  AGE: 80 y.o. SEX: female  Anglican Affiliation: Sue   Language: English     4/30/2018     Total Time (in minutes): 5     Spiritual Assessment begun in [facility] 3 INTENSIVE CARE through conversation with:         []Patient        [] Family    [] Friend(s)        Reason for Consult: Palliative Care, Initial/Spiritual Assessment     Spiritual beliefs: (Please include comment if needed)     [] Identifies with a bill tradition:     [] Supported by a bill community:      [] Claims no spiritual orientation:      [] Seeking spiritual identity:           [] Adheres to an individual form of spirituality:      [x] Not able to assess:                     Identified resources for coping:      [] Prayer                               [] Music                  [] Guided Imagery     [] Family/friends                 [] Pet visits     [] Devotional reading                         [x] Unknown     [] Other:                                              Interventions offered during this visit: (See comments for more details)                Plan of Care:     [] Support spiritual and/or cultural needs    [] Support AMD and/or advance care planning process      [] Support grieving process   [] Coordinate Rites and/or Rituals    [] Coordination with community clergy   [] No spiritual needs identified at this time   [] Detailed Plan of Care below (See Comments)  [] Make referral to Music Therapy  [] Make referral to Pet Therapy     [] Make referral to Addiction services  [] Make referral to [facility]  [] Make referral to Spiritual Care Partner  [] No future visits requested        [] Follow up visits as needed     Comments:  attempted initial spiritual assessment for pt on ICU. Pt appeared to be peacefully asleep. [address] will return at a later more appropriate time to attempt again.  Please notify chaplains if needs rise beforehand. Wilbert Caro M.S.   Spiritual Care Department  If needs rise please call Jerica-LARRY ([phone])

## 2018-04-30 NOTE — PROGRESS NOTES
[address] Medicine Residency Attending Addendum:    I was NOT present with the resident during the interview & examination of the patient. I personally interviewed the patient & repeated the critical or key portions of the exam.  I agree with the resident's note with the following additions:    Hx: 79yo F presented with n/v/abdominal pain, our team called for admission for further work up. Exam:   VS: Patient Vitals for the past 4 hrs:   Temp Pulse Resp BP SpO2   04/29/18 2200 - 94 21 134/79 95 %   04/29/18 2138 (!) 101.1 °F (38.4 °C) - - - -   04/29/18 2115 - (!) 104 18 150/63 96 %   04/29/18 2030 - 100 16 149/73 97 %   04/29/18 2015 - (!) 101 29 160/72 95 %   04/29/18 1951 - (!) 102 24 151/80 95 %       GEN: NAD, lying in bed comfortably  ABD: lower abd moderately distended, exquisitely tender with only light palpation diffusely, +guarding, +rebound, +BS  PSYCH: pleasantly demented     Assessment/Plan:    1. Severe sepsis: unknown intraabdominal source, some concern for mesenteric ischemia given ultrasound neg, CT abdomen with sigmoid thickening and ascites but otherwise unremarkable for acute process. Acute abdomen on exam and so surgery was called by resident team.  Dr. Altagracia Lai will review images and agrees with ICU admission to watch closely, IV abx, NPO. Did not think ppx heparin or lovenox indicated at this time. Received sepsis fluids, will cover with Levaquin, Cefepime and Flagyl. Serial abd exams. Trend lactate which is improving with fluids.     Beto 83, DO 4/29/2018

## 2018-04-30 NOTE — PROGRESS NOTES
[facility] Dosing Services: 4/29/18    Pharmacist Renal Dosing Progress Note for Dr Clark Nearing      The following medication: Levofloxacin was automatically dose-adjusted per [facility] P&T Committee Protocol, with respect to renal function. Pt Weight:   Wt Readings from Last 1 Encounters:   04/29/18 59 kg (130 lb)         Previous Regimen  Levofloxacin 750mg ivpb q24h   Serum Creatinine Lab Results   Component Value Date/Time    Creatinine 0.93 04/29/2018 05:18 PM    Creatinine (POC) 0.6 06/30/2010 12:06 PM       Creatinine Clearance Estimated Creatinine Clearance: 37.9 mL/min (based on Cr of 0.93). BUN Lab Results   Component Value Date/Time    BUN 7 04/29/2018 05:18 PM    BUN (POC) 9 06/30/2010 12:06 PM       Dosage changed to:  Levofloxacin 750mg ivpb q48h      Pharmacy to continue to monitor patient daily. Will make dosage adjustments based upon changing renal function.   Courtney Mcghee, PHARMD. Contact information:  [phone]

## 2018-04-30 NOTE — PERIOP NOTES
Pt was taken directly back to OR by Dr Jaiden Green and Juice Rollins CRNA. Pre-op assessment and Vital Signs not completed. All other pre-op requirements completed per protocol.

## 2018-04-30 NOTE — PROGRESS NOTES
Problem: Falls - Risk of  Goal: *Absence of Falls  Document Abhi Fall Risk and appropriate interventions in the flowsheet. Outcome: Progressing Towards Goal  Fall Risk Interventions:  Mobility Interventions: Bed/chair exit alarm              Elimination Interventions: Bed/chair exit alarm, Call light in reach             Problem: Pressure Injury - Risk of  Goal: *Prevention of pressure injury  Document Dimitris Scale and appropriate interventions in the flowsheet.    Outcome: Progressing Towards Goal  Pressure Injury Interventions:  Sensory Interventions: Assess changes in LOC, Assess need for specialty bed, Float heels    Moisture Interventions: Apply protective barrier, creams and emollients, Assess need for specialty bed, Moisture barrier    Activity Interventions: Increase time out of bed, Pressure redistribution bed/mattress(bed type)    Mobility Interventions: HOB 30 degrees or less, Pressure redistribution bed/mattress (bed type)    Nutrition Interventions: Document food/fluid/supplement intake    Friction and Shear Interventions: Feet elevated on foot rest, HOB 30 degrees or less

## 2018-04-30 NOTE — PROGRESS NOTES
[facility] FAMILY MEDICINE RESIDENCY PROGRAM  PROGRESS NOTE     4/30/2018  PCP: Sandra Gusman MD     Assessment/Plan:   Kem Moore is a 80 y.o. female who is admitted for Abdominal pain and SIRS. Overnight events: urinary retention: 800cc output via straight cath. FOBT positive    -Acute Abdominal pain with sepsis:  POA 4/4 SIRS, Improving LA; 3.1-> 2.5 WBC: 19.2-> 14.6. Infectious sources likely diverticulitis  vs acute mesenteric ischemia. CTA shows bibasilar subsegmental atelectasis, 7 cm thickened sigmoid and moderate amount of ascites ( H/o coronary calcifications). -  Continue MIVF to maintain a MAP >65  - IV Antibiotics: Broad spectrum Cefepime, Metro and Levaquin (PCN allergy?)  -  CBC, CMP, blood cultures, Ucx, LA q4  - risk factor assessment: lipid panel, A1c:6.3   - serial abdominal exam  -  ABG   - NPO, strict I/O, Zofran prn, dilaudid prn    FOBT+:  Hgb in the ER 14.4->12.8 this am (baseline ~ 13). FOBT+ likely from mesenteric ischemia/ ischemic colitis vs incidental GI bleed. - 2 large bore peripheral IV lines.  - H&H q6hr, T&S  - IVF  - NPO  - IV protonix  - GI consultation ( discussed with senior- will hold off for now)  - Discontinue the following meds: Lovenox  - SCD    Hyponatremia: mild, POA : 135  - IVF  -CMP    GERD: stable, not on any home meds    Right middle lobe nodule: 5mm in size  - outpatient follow up    Dementia: Mild, stable not on any home meds    Urinary retention: Bladder scan prn    FEN/GI - Regular diet. NS at 100 mL/hr. Activity - Ambulate with assistance  DVT prophylaxis - Lovenox  GI prophylaxis - Not indicated at this time  Disposition - Admit to Medical. Plan to d/c to Home. Code Status - Full, discussed with patient / caregivers. Patient Kem Moore to be discussed Dr. Toma Mehta (Attending Physician). Subjective:   Pt was seen and examined at bedside. C/o abdomen still sore, Denies nausea, vomiting,dizziness.      Objective: Physical examination  Visit Vitals    /84 (BP 1 Location: Right arm, BP Patient Position: At rest)    Pulse (!) 104    Temp 99.5 °F (37.5 °C)    Resp 19    Ht 5' 3\" (1.6 m)    Wt 130 lb (59 kg)    LMP 1995    SpO2 94%    BMI 23.03 kg/m2      Temp (24hrs), Av.7 °F (37.6 °C), Min:99 °F (37.2 °C), Max:101.1 °F (38.4 °C)         O2 Device: Room air      Date 18 - 18 0618 - 18 0659   Shift 0574-9918 4463-3808 24 Hour Total 1900-0659 24 Hour Total   I  N  T  A  K  E   I.V.  780 780         Volume (0.9% sodium chloride infusion)  430 430         Volume (cefepime (MAXIPIME) 2 g in 0.9% sodium chloride (MBP/ADV) 100 mL)  100 100         Volume (levoFLOXacin (LEVAQUIN) 750 mg in D5W IVPB)  150 150         Volume (metroNIDAZOLE (FLAGYL) IVPB premix 500 mg)  100 100       Shift Total  (mL/kg)  780  (13.2) 780  (13.2)      O  U  T  P  U  T   Urine          Urine Voided 600  600         Urine Output (mL) (Urinary Catheter 18 Ryder - Temperature)  800 800       Shift Total  (mL/kg) 600 800  (13.6) 1400  (23.7)      NET -600 -20 -620      Weight (kg)  59 59 59 59 59         Last 3 shifts:    701 - 1900  In: -   Out: 600 [Urine:600]    General:   Alert, cooperative, no acute distress   Head:   Atraumatic   Eyes:   Conjunctivae clear   ENT:  Oral mucosa normal   Neck:  Supple, trachea midline, no adenopathy   No JVD   Back:    No CVA tenderness    Chest wall:    No tenderness or deformities    Lungs:   Clear to auscultation bilaterally    Heart:   Regular rhythm, no murmur   Abdomen:    Soft,  Periumbilical and lower quadrants tenderness with guarding with rebound tenderness.     No masses or organomegaly    Extremities:  No edema or DVT signs   Pulses:  Symmetric all extremities   Skin:  Warm and dry    No rashes or lesions   Neurologic:  Oriented   No focal deficits         Data Review:     Recent Labs      18   0005 04/29/18   1718   WBC  14.6*  19.2*   HGB  12.8  14.4   HCT  39.2  43.3   PLT  232  281     Recent Labs      04/30/18   0006  04/29/18   1718   NA  135*  135*   K  3.6  3.7   CL  102  99   CO2  25  25   GLU  190*  187*   BUN  7  7   CREA  0.88  0.93   CA  8.3*  9.5   MG  1.6   --    PHOS   --   3.1   ALB  2.9*  3.6   TBILI  0.8  0.8   SGOT  14*  19   ALT  21  24     Medications reviewed  Current Facility-Administered Medications   Medication Dose Route Frequency    pantoprazole (PROTONIX) 40 mg in sodium chloride 0.9% 10 mL injection  40 mg IntraVENous Q12H    sodium chloride (NS) flush 5-10 mL  5-10 mL IntraVENous PRN    cefepime (MAXIPIME) 2 g in 0.9% sodium chloride (MBP/ADV) 100 mL  2 g IntraVENous Q8H    sodium chloride (NS) flush 5-10 mL  5-10 mL IntraVENous Q8H    sodium chloride (NS) flush 5-10 mL  5-10 mL IntraVENous PRN    0.9% sodium chloride infusion  100 mL/hr IntraVENous CONTINUOUS    naloxone (NARCAN) injection 0.4 mg  0.4 mg IntraVENous PRN    ondansetron (ZOFRAN) injection 4 mg  4 mg IntraVENous Q6H PRN    levoFLOXacin (LEVAQUIN) 750 mg in D5W IVPB  750 mg IntraVENous Q48H    metroNIDAZOLE (FLAGYL) IVPB premix 500 mg  500 mg IntraVENous Q12H    HYDROmorphone (DILAUDID) injection 0.5 mg  0.5 mg IntraVENous Q6H PRN         Signed:   Zach Jefferson MD   Resident, Family Medicine      Attending note: Attending note to follow. ..

## 2018-04-30 NOTE — PROGRESS NOTES
0700  Bedside and Verbal shift change report given to Oxana Marie RN (oncoming nurse) by Charanjit Lind RN  (offgoing nurse). Report included the following information SBAR, MAR and Recent Results. 0755  Pt assessed, VSS.    0915  Soap suds enema given per order received from surgery. 5903  Pt bathed. VS remain stable, RN to monitor. 1900  Bedside and Verbal shift change report given to [facility], RN  (oncoming nurse) by Charanjit Lind RN  (offgoing nurse). Report included the following information SBAR, MAR and Recent Results.

## 2018-04-30 NOTE — ED NOTES
TRANSFER - OUT REPORT:    Verbal report given to [facility] [place], RN(name) on [place]  being transferred to ICU 15(unit) for routine progression of care       Report consisted of patients Situation, Background, Assessment and   Recommendations(SBAR). Information from the following report(s) SBAR, Kardex, ED Summary, Procedure Summary, Intake/Output, MAR, Recent Results and Cardiac Rhythm NSR was reviewed with the receiving nurse. Lines:   Peripheral IV 04/29/18 Left; Inner Antecubital (Active)   Site Assessment Clean, dry, & intact 4/29/2018  5:19 PM   Phlebitis Assessment 0 4/29/2018  5:19 PM   Infiltration Assessment 0 4/29/2018  5:19 PM   Dressing Status Clean, dry, & intact 4/29/2018  5:19 PM   Hub Color/Line Status Pink 4/29/2018  5:19 PM   Action Taken Catheter retaped 4/29/2018  5:19 PM       Peripheral IV 04/29/18 Right Forearm (Active)   Site Assessment Clean, dry, & intact 4/29/2018  8:18 PM   Phlebitis Assessment 0 4/29/2018  8:18 PM   Infiltration Assessment 0 4/29/2018  8:18 PM   Dressing Status Clean, dry, & intact 4/29/2018  8:18 PM   Dressing Type Transparent 4/29/2018  8:18 PM   Hub Color/Line Status Pink;Flushed;Patent 4/29/2018  8:18 PM   Action Taken Blood drawn 4/29/2018  8:18 PM        Opportunity for questions and clarification was provided.       Patient transported with:   Monitor  Registered Nurse

## 2018-04-30 NOTE — ED NOTES
Patient has one episode of vomiting bright green liquid. Dr. Sondra Jackman notified. New orders received.

## 2018-04-30 NOTE — PROGRESS NOTES
SEPSIS REASSESSMENT/ Serial abdominal exam    S: Anastacio Camp is currently being treated for  Sepsis 2/2 to an intraabdominal process. She is undergoing treatment, including Broad-Spectrum Antibiotics and completed the initial 30 cc/kg fluid challenge. Patient is mildly demented.  Has not passed gas or bowel movement per nurse    O:   Sepsis Re-Assessment Documentation:   Vital Signs  Level of Consciousness: Alert  Temp: 99 °F (37.2 °C)  Temp Source: Oral  Pulse (Heart Rate): (!) 104  Heart Rate Source: Monitor  Cardiac Rhythm: Normal sinus rhythm  Resp Rate: 19  BP: 128/84  MAP (Monitor): 96  MAP (Calculated): 100  BP 1 Location: Right arm  BP 1 Method: Automatic  BP Patient Position: At rest  MEWS Score: 3    Date 04/29/18 0700 - 04/30/18 0659 04/30/18 0700 - 05/01/18 0659   Shift 2646-0034 2676-7239 24 Hour Total 3476-7905 9964-4816 24 Hour Total   I  N  T  A  K  E   I.V.  380 380         Volume (0.9% sodium chloride infusion)  30 30         Volume (cefepime (MAXIPIME) 2 g in 0.9% sodium chloride (MBP/ADV) 100 mL)  100 100         Volume (levoFLOXacin (LEVAQUIN) 750 mg in D5W IVPB)  150 150         Volume (metroNIDAZOLE (FLAGYL) IVPB premix 500 mg)  100 100       Shift Total  (mL/kg)  380  (6.4) 380  (6.4)      O  U  T  P  U  T   Urine 600  600         Urine Voided 600  600       Shift Total  (mL/kg) 600  600  (10.2)      NET -600 380 -220      Weight (kg)  59 59 59 59 59       Recent Results (from the past 4 hour(s))   LACTIC ACID    Collection Time: 04/30/18 12:06 AM   Result Value Ref Range    Lactic acid 2.5 (HH) 0.4 - 2.0 MMOL/L   METABOLIC PANEL, COMPREHENSIVE    Collection Time: 04/30/18 12:06 AM   Result Value Ref Range    Sodium 135 (L) 136 - 145 mmol/L    Potassium 3.6 3.5 - 5.1 mmol/L    Chloride 102 97 - 108 mmol/L    CO2 25 21 - 32 mmol/L    Anion gap 8 5 - 15 mmol/L    Glucose 190 (H) 65 - 100 mg/dL    BUN 7 6 - 20 MG/DL    Creatinine 0.88 0.55 - 1.02 MG/DL    BUN/Creatinine ratio 8 (L) 12 - 20      GFR est AA >60 >60 ml/min/1.73m2    GFR est non-AA >60 >60 ml/min/1.73m2    Calcium 8.3 (L) 8.5 - 10.1 MG/DL    Bilirubin, total 0.8 0.2 - 1.0 MG/DL    ALT (SGPT) 21 12 - 78 U/L    AST (SGOT) 14 (L) 15 - 37 U/L    Alk. phosphatase 61 45 - 117 U/L    Protein, total 6.5 6.4 - 8.2 g/dL    Albumin 2.9 (L) 3.5 - 5.0 g/dL    Globulin 3.6 2.0 - 4.0 g/dL    A-G Ratio 0.8 (L) 1.1 - 2.2     CBC WITH AUTOMATED DIFF    Collection Time: 04/30/18 12:06 AM   Result Value Ref Range    WBC 14.6 (H) 3.6 - 11.0 K/uL    RBC 4.34 3.80 - 5.20 M/uL    HGB 12.8 11.5 - 16.0 g/dL    HCT 39.2 35.0 - 47.0 %    MCV 90.3 80.0 - 99.0 FL    MCH 29.5 26.0 - 34.0 PG    MCHC 32.7 30.0 - 36.5 g/dL    RDW 12.8 11.5 - 14.5 %    PLATELET 249 976 - 639 K/uL    MPV 10.9 8.9 - 12.9 FL    NRBC 0.0 0  WBC    ABSOLUTE NRBC 0.00 0.00 - 0.01 K/uL    NEUTROPHILS 86 (H) 32 - 75 %    BAND NEUTROPHILS 4 0 - 6 %    LYMPHOCYTES 6 (L) 12 - 49 %    MONOCYTES 4 (L) 5 - 13 %    EOSINOPHILS 0 0 - 7 %    BASOPHILS 0 0 - 1 %    IMMATURE GRANULOCYTES 0 %    ABS. NEUTROPHILS 13.1 (H) 1.8 - 8.0 K/UL    ABS. LYMPHOCYTES 0.9 0.8 - 3.5 K/UL    ABS. MONOCYTES 0.6 0.0 - 1.0 K/UL    ABS. EOSINOPHILS 0.0 0.0 - 0.4 K/UL    ABS. BASOPHILS 0.0 0.0 - 0.1 K/UL    ABS. IMM. GRANS. 0.0 K/UL    DF MANUAL      RBC COMMENTS NORMOCYTIC, NORMOCHROMIC     MAGNESIUM    Collection Time: 04/30/18 12:06 AM   Result Value Ref Range    Magnesium 1.6 1.6 - 2.4 mg/dL       General appearance: alert, cooperative, no distress, appears stated age  Lungs: bibasilar fine rales, no wheezes, no increased work of breathing  Heart: tachycardic and rhythm, S1, S2 normal, no murmur, click, rub or gallop. Pulses: 2+ and symmetric  Capillary Refill: <3 seconds  Abdomen: soft, mildred umbilical, Lower quadrants tenderness with guarding, rebound tenderness. No tympanism.  Bowel sounds normal. No masses,  no organomegaly  Extremities: extremities normal, atraumatic, no cyanosis or edema  Skin: Pink,  Neurologic: Alert and not oriented to place or time, normal strength and tone. Normal symmetric reflexes. Lactic acid: 2.5  Assessment and Plan: Kristi Jain is an 80 y.o. female currently being treated for  Sepsis due to intraabdominal process. LA is improving. MAP > 65  - continue current management: AB, IVF, f/u labs. ( see progress notes)     Zach Jefferson MD  4/30/2018 12:03 AM

## 2018-04-30 NOTE — CONSULTS
Palliative Medicine Consult  Carlos Enrique: [phone] ([phone])    Patient Name: Roxie Griffin  YOB: 1934    Date of Initial Consult: 04/30/2018  Reason for Consult: Care decisions  Requesting Provider: Damien Zuniga MD   Primary Care Physician: Libertad Alvarez MD     SUMMARY:   Roxie Griffin is a 80 y.o. with a past history of dementia, GERD, UTI, and anxiety who was admitted on 4/29/2018 from home with a diagnosis of abdominal pain. Patient presented to the ED with complaints of generalized weakness, abdominal pain, vomiting, and decreased appetite for the last few days. Work-up revealed elevated lactic acid and leukocytosis. Patient admitted to ICU and started on IVF and broad spectrum antibiotics. Blood and urine cultures pending. Initial CT abdomen/pelvis was read as diverticulosis but has been reviewed by radiologist again today who raised concern for perforated appendicitis. Plan for laparoscopic appendectomy later today (4/30). Current medical issues leading to Palliative Medicine involvement include: care decisions due to risk for decline. PALLIATIVE DIAGNOSES:   1. Impaired memory  2. Abdominal Pain  3. Physical debility  4. Goals of care  5. Concern for perforated appendix- for OR today  6. Baseline Dementia       PLAN:   1. Met with patient and introduced the role of Palliative Medicine. 2. Patient alert and oriented to person only- asks me several time in the course of interview where she was, where her  was, and what she was here for. Very pleasant, is easily reoriented but quickly forgets. 3. Symptom Management--> Patient unable to localize her abdominal pain but is tender to palpation. Has dilaudid ordered IV 0.5 mg every 6 hours as needed and per bedside nurse, this provides adequate pain control. Plan for OR tonight. 4. Goals of care--> Patient scheduled for the OR this evening for lap appendectomy.   has already left the hospital and message was left ([phone]) to schedule meeting with PM team to further discuss goals of care. 5. ACP--> No AMD on file and patient unable to make her own decisions due to dementia; therefore default surrogate decision maker is legal next of kin which is her  Ángel Caro ([phone]). ACP updated and will confirm with family if patient has completed ACP documents. 6. Initial consult note routed to primary continuity provider  7. Communicated plan of care with: Palliative IDT       GOALS OF CARE / TREATMENT PREFERENCES:     GOALS OF CARE:  Patient/Health Care Proxy Stated Goals:  (To OR tonight for appendectomy; fullr estorative measures in an effort to recover)      TREATMENT PREFERENCES:   Code Status: Full Code    Advance Care Planning:  Advance Care Planning 4/30/2018   Patient's Healthcare Decision Maker is: -   Primary Decision Maker Name Ángel Caro   Primary Decision Maker Phone Number [phone]   Primary Decision Maker Relationship to Patient Spouse   Confirm Advance Directive -       Medical Interventions: Full interventions   Other Instructions:   Artificially Administered Nutrition:  (not addressed)     Other:    As far as possible, the palliative care team has discussed with patient / health care proxy about goals of care / treatment preferences for patient. HISTORY:     History obtained from: chart, bedside nurse    CHIEF COMPLAINT:  generalized weakness, abdominal pain, vomiting    HPI/SUBJECTIVE:    The patient is:   [] Verbal and participatory  [x] Non-participatory due to: baseline dementia      Patient presented to the ED with complaints of generalized weakness, abdominal pain, vomiting, and decreased appetite for the last few days. Work-up revealed elevated lactic acid and leukocytosis. Patient admitted to ICU and started on IVF and broad spectrum antibiotics. Blood and urine cultures pending.     Initial CT abdomen/pelvis was read as diverticulosis but has been reviewed by radiologist again today who raised concern for perforated appendicitis. Plan for laparoscopic appendectomy    Clinical Pain Assessment (nonverbal scale for severity on nonverbal patients):   Clinical Pain Assessment  Severity: 4  Location: abdomen  Character: generalized  Duration: several days  Effect: limited movement  Factors: movement, palpation  Frequency: constant     Activity (Movement): Seeking attention through movement or slow, cautious movement    Duration: for how long has pt been experiencing pain (e.g., 2 days, 1 month, years)  Frequency: how often pain is an issue (e.g., several times per day, once every few days, constant)     FUNCTIONAL ASSESSMENT:     Palliative Performance Scale (PPS):  PPS: 60       PSYCHOSOCIAL/SPIRITUAL SCREENING:     Palliative IDT has assessed this patient for cultural preferences / practices and a referral made as appropriate to needs (Cultural Services, Patient Advocacy, Ethics, etc.)    Advance Care Planning:  Advance Care Planning 4/30/2018   Patient's Healthcare Decision Maker is: -   Primary Decision Maker Name Shalini Mckoy   Primary Decision Maker Phone Number [phone]   Primary Decision Maker Relationship to Patient Spouse   Confirm Advance Directive -       Any spiritual / Tenriism concerns:  [] Yes /  [x] No    Caregiver Burnout:  [] Yes /  [] No /  [x] No Caregiver Present      Anticipatory grief assessment:   [x] Normal  / [] Maladaptive       ESAS Anxiety:      ESAS Depression:          REVIEW OF SYSTEMS:     Positive and pertinent negative findings in ROS are noted above in HPI. The following systems were [] reviewed / [x] unable to be reviewed as noted in HPI  Other findings are noted below. Systems: constitutional, ears/nose/mouth/throat, respiratory, gastrointestinal, genitourinary, musculoskeletal, integumentary, neurologic, psychiatric, endocrine. Positive findings noted below.   Modified ESAS Completed by: provider     Drowsiness: 2 Pain: 4                                PHYSICAL EXAM:     From RN flowsheet:  Wt Readings from Last 3 Encounters:   04/29/18 130 lb (59 kg)   04/05/18 119 lb (54 kg)   07/25/17 128 lb (58.1 kg)     Blood pressure 129/61, pulse 89, temperature 97.7 °F (36.5 °C), resp. rate 27, height 5' 3\" (1.6 m), weight 130 lb (59 kg), last menstrual period 01/31/1995, SpO2 94 %. Pain Scale 1: Numeric (0 - 10)  Pain Intensity 1: 0     Pain Location 1: Abdomen  Pain Orientation 1: Right, Lower  Pain Description 1: Hypersensitivity  Pain Intervention(s) 1: Medication (see MAR)  Last bowel movement, if known:     Constitutional: NAD  Eyes: pupils equal, anicteric  ENMT: no nasal discharge, moist mucous membranes  Cardiovascular: regular rhythm, distal pulses intact, no LARA  Respiratory: breathing not labored, symmetric  Gastrointestinal: soft, generalized TTP, +bowel sounds  Musculoskeletal: no deformity, no tenderness to palpation  Skin: warm, dry  Neurologic: following commands, moving all extremities  Psychiatric: full affect, no hallucinations  Other:       HISTORY:     Principal Problem:    Abdominal pain (4/29/2018)    Active Problems:    GERD (gastroesophageal reflux disease) (4/23/2010)      Dementia without behavioral disturbance (5/13/2015)      Prediabetes (4/30/2018)      SIRS (systemic inflammatory response syndrome) (HCC) (4/30/2018)      Past Medical History:   Diagnosis Date    Alzheimer disease     Anxiety 4/23/2010    GERD (gastroesophageal reflux disease) 4/23/2010    Mild dementia     UTI (lower urinary tract infection) 4/23/2010      Past Surgical History:   Procedure Laterality Date    ENDOSCOPY, COLON, DIAGNOSTIC      HX CATARACT REMOVAL  5/2011    left    HX UROLOGICAL      bladder uplift      Family History   Problem Relation Age of Onset    Cancer Father       History reviewed, no pertinent family history.   Social History   Substance Use Topics    Smoking status: Never Smoker    Smokeless tobacco: Never Used    Alcohol use No     Allergies   Allergen Reactions    Macrodantin [Nitrofurantoin Macrocrystalline] Rash    Pcn [Penicillins] Rash    Sulfa (Sulfonamide Antibiotics) Rash      Current Facility-Administered Medications   Medication Dose Route Frequency    pantoprazole (PROTONIX) 40 mg in sodium chloride 0.9% 10 mL injection  40 mg IntraVENous Q12H    glucose chewable tablet 16 g  4 Tab Oral PRN    dextrose (D50W) injection syrg 12.5-25 g  12.5-25 g IntraVENous PRN    glucagon (GLUCAGEN) injection 1 mg  1 mg IntraMUSCular PRN    insulin lispro (HUMALOG) injection   SubCUTAneous Q6H    sodium chloride (NS) flush 5-10 mL  5-10 mL IntraVENous PRN    cefepime (MAXIPIME) 2 g in 0.9% sodium chloride (MBP/ADV) 100 mL  2 g IntraVENous Q8H    sodium chloride (NS) flush 5-10 mL  5-10 mL IntraVENous Q8H    sodium chloride (NS) flush 5-10 mL  5-10 mL IntraVENous PRN    0.9% sodium chloride infusion  100 mL/hr IntraVENous CONTINUOUS    naloxone (NARCAN) injection 0.4 mg  0.4 mg IntraVENous PRN    ondansetron (ZOFRAN) injection 4 mg  4 mg IntraVENous Q6H PRN    metroNIDAZOLE (FLAGYL) IVPB premix 500 mg  500 mg IntraVENous Q12H    HYDROmorphone (DILAUDID) injection 0.5 mg  0.5 mg IntraVENous Q6H PRN          LAB AND IMAGING FINDINGS:     Lab Results   Component Value Date/Time    WBC 14.6 (H) 04/30/2018 12:06 AM    HGB 11.8 04/30/2018 12:47 PM    PLATELET 086 00/61/1021 12:06 AM     Lab Results   Component Value Date/Time    Sodium 135 (L) 04/30/2018 12:06 AM    Potassium 3.6 04/30/2018 12:06 AM    Chloride 102 04/30/2018 12:06 AM    CO2 25 04/30/2018 12:06 AM    BUN 7 04/30/2018 12:06 AM    Creatinine 0.88 04/30/2018 12:06 AM    Calcium 8.3 (L) 04/30/2018 12:06 AM    Magnesium 1.6 04/30/2018 12:06 AM    Phosphorus 3.1 04/29/2018 05:18 PM      Lab Results   Component Value Date/Time    AST (SGOT) 14 (L) 04/30/2018 12:06 AM    Alk.  phosphatase 61 04/30/2018 12:06 AM    Protein, total 6.5 04/30/2018 12:06 AM    Albumin 2.9 (L) 04/30/2018 12:06 AM    Globulin 3.6 04/30/2018 12:06 AM     Lab Results   Component Value Date/Time    INR 1.1 06/30/2010 12:00 PM    Prothrombin time 10.9 06/30/2010 12:00 PM      No results found for: IRON, FE, TIBC, IBCT, PSAT, FERR   No results found for: PH, PCO2, PO2  No components found for: GLPOC   No results found for: CPK, CKMB             Total time: 30 min  Counseling / coordination time, spent as noted above: 20 min  > 50% counseling / coordination?: yes    Prolonged service was provided for  []30 min   []75 min in face to face time in the presence of the patient, spent as noted above. Time Start:   Time End:   Note: this can only be billed with 37874 (initial) or 50569 (follow up). If multiple start / stop times, list each separately.

## 2018-04-30 NOTE — PROGRESS NOTES
Occupational Therapy Note:  Orders acknowledged, chart reviewed, and spoke with team IDR. Current plan is for lap appendectomy this evening. Patient with significant pain at this time. Will hold OT evaluation until post procedure, once stable, and cleared for activity. Thank you.   Laith Parkinson OTR/L

## 2018-04-30 NOTE — PROGRESS NOTES
[facility]: MED RECONCILIATION    Comments/Recommendations:     Pt's spouse indicated she is not taking any prescription medications, supplements, vitamins, etc.  - she is only taking cranberry capsule daily  - reviewed some names of prescriptions seen in RxQuery and the spouse repeatedly confirmed pt is no longer taking anything    Removed Ciprofloxacin from list  -  indicated the pt is not allergic and was recently prescribed the antiobiotic      Medications removed:  · MVI 1 tab daily      Information obtained from: Spouse, RxQuery, recent medical record        Allergies: Macrodantin [nitrofurantoin macrocrystalline]; Pcn [penicillins]; and Sulfa (sulfonamide antibiotics)    Prior to Admission Medications:     Medication Documentation Review Audit       Reviewed by Sanjuana Bonner. Raciel Beckwith (Pharmacist) on 04/29/18 at 2040         Medication Sig Documenting Provider Last Dose Status Taking?      cranberry extract 450 mg tab tablet Take 450 mg by mouth daily. Historical Provider 4/29/2018 AM Active Yes                    Thank you,  Bell Hannah, Pharm. D.

## 2018-04-30 NOTE — PROGRESS NOTES
Physical Therapy Note:  Orders acknowledged, chart reviewed, and spoke with team IDR. Current plan is for lap appendectomy this evening. Patient with significant pain at this time. Will hold PT evaluation until post procedure, once stable, and cleared for activity. Thank you.

## 2018-04-30 NOTE — PROGRESS NOTES
SEPSIS REASSESSMENT    S: Kadi Nguyen is currently being treated for  Sepsis 2/2 to an intraabdominal process. She is undergoing treatment, including Broad-Spectrum Antibiotics and completed the initial 30 cc/kg fluid challenge. Patient is mildly demented    O:   Sepsis Re-Assessment Documentation:   Vital Signs  Level of Consciousness: Alert  Temp: 99 °F (37.2 °C)  Temp Source: Oral  Pulse (Heart Rate): 95  Heart Rate Source: Monitor  Cardiac Rhythm: Normal sinus rhythm  Resp Rate: 30  BP: 146/52  MAP (Monitor): 74  MAP (Calculated): 100  BP 1 Location: Right arm  BP 1 Method: Automatic  BP Patient Position: At rest  MEWS Score: 3    Date 04/29/18 0700 - 04/30/18 0659 04/30/18 0700 - 05/01/18 0659   Shift 7398-1182 0032-3307 24 Hour Total 8622-3005 5698-5726 24 Hour Total   I  N  T  A  K  E   I.V.  380 380         Volume (0.9% sodium chloride infusion)  30 30         Volume (cefepime (MAXIPIME) 2 g in 0.9% sodium chloride (MBP/ADV) 100 mL)  100 100         Volume (levoFLOXacin (LEVAQUIN) 750 mg in D5W IVPB)  150 150         Volume (metroNIDAZOLE (FLAGYL) IVPB premix 500 mg)  100 100       Shift Total  (mL/kg)  380  (6.4) 380  (6.4)      O  U  T  P  U  T   Urine 600  600         Urine Voided 600  600       Shift Total  (mL/kg) 600  600  (10.2)      NET -600 380 -220      Weight (kg)  59 59 59 59 59       Recent Results (from the past 4 hour(s))   LACTIC ACID    Collection Time: 04/29/18  8:19 PM   Result Value Ref Range    Lactic acid 2.8 (HH) 0.4 - 2.0 MMOL/L       General appearance: alert, cooperative, no distress, appears stated age  Lungs: bibasilar fine rales, no wheezes, no increased work of breathing  Heart: tachycardic and rhythm, S1, S2 normal, no murmur, click, rub or gallop. Pulses: 2+ and symmetric  Capillary Refill: <3 seconds  Abdomen: soft, mildred umbilical, Lower quadrants tenderness with guarding.  Bowel sounds normal. No masses,  no organomegaly  Extremities: extremities normal, atraumatic, no cyanosis or edema  Skin:  Pink,  Neurologic: Alert and not oriented to place or time, normal strength and tone. Normal symmetric reflexes. Lactic acid: 2.8  Assessment and Plan: Romana Vinson is an 80 y.o. female currently being treated for  Sepsis due to intraabdominal process. LA is improving. MAP > 65  - continue current management: AB, IVF, f/u labs. ( see progress notes)     Zach Jefferson MD  4/30/2018 12:03 AM

## 2018-04-30 NOTE — CONSULTS
Surgery Consult    Subjective:      Kadi Nguyen is a 80 y.o. female admitted overnight for abdominal pain. Pt had intermittent abdominal pain over the past few days with worsening yesterday. Pain lower abdomen, last BM 2 days ago. Pain accompanied by nausea, weakness, fever, vomiting. On admission had leukocytosis of 19, elevated lactate. Initial CT read as diverticulosis but has been reviewed by radiologist again today who raised concern for perforated appendicitis.      Past Medical History:   Diagnosis Date    Alzheimer disease     Anxiety 4/23/2010    GERD (gastroesophageal reflux disease) 4/23/2010    Mild dementia     UTI (lower urinary tract infection) 4/23/2010     Past Surgical History:   Procedure Laterality Date    ENDOSCOPY, COLON, DIAGNOSTIC      HX CATARACT REMOVAL  5/2011    left    HX UROLOGICAL      bladder uplift      Family History   Problem Relation Age of Onset    Cancer Father      Social History     Social History    Marital status:      Spouse name: N/A    Number of children: N/A    Years of education: N/A     Social History Main Topics    Smoking status: Never Smoker    Smokeless tobacco: Never Used    Alcohol use No    Drug use: No    Sexual activity: Not Currently     Other Topics Concern    Not on file     Social History Narrative      Current Facility-Administered Medications   Medication Dose Route Frequency    pantoprazole (PROTONIX) 40 mg in sodium chloride 0.9% 10 mL injection  40 mg IntraVENous Q12H    sodium chloride (NS) flush 5-10 mL  5-10 mL IntraVENous PRN    cefepime (MAXIPIME) 2 g in 0.9% sodium chloride (MBP/ADV) 100 mL  2 g IntraVENous Q8H    sodium chloride (NS) flush 5-10 mL  5-10 mL IntraVENous Q8H    sodium chloride (NS) flush 5-10 mL  5-10 mL IntraVENous PRN    0.9% sodium chloride infusion  100 mL/hr IntraVENous CONTINUOUS    naloxone (NARCAN) injection 0.4 mg  0.4 mg IntraVENous PRN    ondansetron (ZOFRAN) injection 4 mg  4 mg IntraVENous Q6H PRN    metroNIDAZOLE (FLAGYL) IVPB premix 500 mg  500 mg IntraVENous Q12H    HYDROmorphone (DILAUDID) injection 0.5 mg  0.5 mg IntraVENous Q6H PRN      Allergies   Allergen Reactions    Macrodantin [Nitrofurantoin Macrocrystalline] Rash    Pcn [Penicillins] Rash    Sulfa (Sulfonamide Antibiotics) Rash       Review of Systems:REVIEW OF SYSTEMS:     []     Unable to obtain  ROS due to  []    mental status change  []    sedated   []    intubated   []    Total of 12 systems reviewed as follows:    Constitutional: + for fevers, malaise  Eyes: negative for blurry vision  Ears, nose, mouth, throat, and face: negative for sore throat  Respiratory: negative for SOB  Cardiovascular: negative for CP  Gastrointestinal: + for nausea, vomiting, abdominal pain, negative for diarrhea, constipation, melena, hematochezia  Genitourinary: negative for dysuria  Integument/breast: neg for skin rash  Hematologic/lymphatic: neg for bruising  Musculoskeletal: negative for muscle aches  Neurological: no dizziness or h/a    Objective:      Patient Vitals for the past 8 hrs:   BP Temp Pulse Resp SpO2   18 0900 124/53 - 89 30 94 %   18 0800 124/54 - 87 23 96 %   18 0755 - - - - 97 %   18 0700 118/48 99.5 °F (37.5 °C) 87 25 94 %   18 0600 117/59 - 90 17 97 %   18 0500 119/56 - 91 20 95 %   18 0400 124/62 - 97 24 96 %   18 0300 128/84 99.5 °F (37.5 °C) (!) 104 19 94 %       Temp (24hrs), Av.6 °F (37.6 °C), Min:99 °F (37.2 °C), Max:101.1 °F (38.4 °C)      Physical Exam:  General:  Alert, cooperative, no distress   Eyes:  Conjunctivae/corneas clear. Nose: Nares normal. Septum midline   Mouth/Throat: Lips, mucosa, and tongue normal.    Neck: Supple, symmetrical, trachea midline   Lungs:   Clear to auscultation bilaterally. Heart:  Regular rate and rhythm   Abdomen:   Soft, lower abdominal tenderness with guarding, no distention.     Extremities: Extremities normal, atraumatic, no cyanosis or edema. Skin: Skin color, texture, turgor normal. No rashes or lesions   Neuro: Alert, oriented, speech clear     Labs: Recent Labs      04/30/18   0454  04/30/18   0006   WBC   --   14.6*   HGB  12.7  12.8   HCT  39.2  39.2   PLT   --   232     Recent Labs      04/30/18   0006  04/29/18   1718   NA  135*  135*   K  3.6  3.7   CL  102  99   CO2  25  25   GLU  190*  187*   BUN  7  7   CREA  0.88  0.93   CA  8.3*  9.5   MG  1.6   --    PHOS   --   3.1   ALB  2.9*  3.6   TBILI  0.8  0.8   SGOT  14*  19   ALT  21  24     No results for input(s): INR in the last 72 hours. No lab exists for component: INREXT    CT reviewed with Dr. Deejay Peralta. Appendix tip thickened with surrounding stranding, fluid, extraluminal air concerning for perforated appendicitis. No drainable collection. Assessment and Plan:     Perforated appendicitis    Continue IV fluid, ABX, NPO. Plan for lap appendectomy this evening by Dr. Simi Ames. ICU has spoken with family. We will discuss surgical plan further with  this afternoon when he arrives.            Signed By: KALI King     April 30, 2018

## 2018-04-30 NOTE — PROGRESS NOTES
Reason for Admission:   abdominal pain                  RRAT Score:     13             Do you (patient/family) have any concerns for transition/discharge? Memory problems which may interfere with remembering how to care for self post-operatively  Plan for utilizing home health:       Home health versus rehab s/p appendectomy  Likelihood of readmission? High/red  Transition of Care Plan:         I met with pt who is not a good historian due to her memory problems. When  ,Isabel Huff and daughter Jasper Fernández visited ,I met with them to begin disposition discussion . Iggy's number is [phone]. Daughter,Vangie's number is [phone]   states that his wife is typically independent with her ADLs but she has severe memory deficits. Pt needs constant reminders over and over with one step directions. Pt has medicare & blue cross insurance. Her  visited briefly today and does not plan to return to the hospital but would like an update by phone after [phone]. Daughter ,Jasper Fernández ,who is hearing impaired plans to return to the hospital but kept asking if she should return if her mother will be sleeping. Case Management will continue to follow pt for discharge needs. Do Son      Addendum-I notified Dr Blanquita Agudelo and Horacio Parker ,nurse navigator with Dr Blanquita Agudelo that pt has been admitted to [facility].     Do Son

## 2018-04-30 NOTE — ROUTINE PROCESS
TRANSFER - IN REPORT:    Verbal report received from [facility]) on Liza Moon  being received from ED(unit) for routine progression of care      Report consisted of patients Situation, Background, Assessment and   Recommendations(SBAR). Information from the following report(s) SBAR, Recent Results, Med Rec Status, Cardiac Rhythm SR and Alarm Parameters  was reviewed with the receiving nurse. Opportunity for questions and clarification was provided. Assessment completed upon patients arrival to unit and care assumed.

## 2018-04-30 NOTE — PROGRESS NOTES
Resident Pulmonary Note     Name: Stephania Coronado  MRN: [id number]  Date: 2018 8:33 AM  Admission Date: 2018  : 1934  PCP: Eleni Marquez MD     Assessment/Plan:         Impression Plan   1. Acute Appendicitis with contained perforation: Discussed with   2. Sepsis secondary to Appendicitis  3. FOBT Positive  4. Elevated Lactic Acid: Resolved  5. Prediabetes  6. Fecal Impaction  7. Dementia:  of Patient Keri Ochoa - [phone]  8. Right Middle Lung Nodule: 5 mm nodule noted, present on previous scans, no follow up required     · Continue Broad spectrum antibiotics  · IVF  · Pain Control  · Gen Surgery following: Plan for OR tonight,  aware of situation will come to discuss plan  · GI Following  · Strict I/Os - Currently adequate (~1.8 ml/kg/hr)  · F/u Ucx, Bcx  · Trend Hgb  · NPO    SUP Prophylaxis: Protonix  DVT: SCDs (FOBT Positive)         Subjective:   24 Hour Events:  - No acute events    Vent Settings: N/A  Drips: N/A    Pt was seen and examined at bedside. Patient reports continued abdominal pain, sharp in nature, worse on the right side. Exacerbated by palpation and release of pressure on the abdomen. Noting abdominal pain moving to the right. Spoke to : Reports that patient had a very painful bowel movement yesterday after which patient was complaining of abodminal pain with fevers/chills, nausea, and other complaints. Patient has not been eating/drinking well due to abdominal pain. Denies any history of cardiac issues, diabetes, or abdominal issues. Confirms bladder lift (2012 surgery and recent review for increased frequency) and no other surgeries. Patient is Non-smoker, No drug use.     Review of Systems:  Endorses abdominal pain  Denies chest pain  ROS limited due to patient factors    Objective:   Physical examination  Visit Vitals    /48    Pulse 87    Temp 99.5 °F (37.5 °C)    Resp 25    Ht 5' 3\" (1.6 m)    Wt 130 lb (59 kg)    LMP 1995    SpO2 97%    BMI 23.03 kg/m2      Temp (24hrs), Av.6 °F (37.6 °C), Min:99 °F (37.2 °C), Max:101.1 °F (38.4 °C)         O2 Device: Room air      Intake/Output Summary (Last 24 hours) at 18 0833  Last data filed at 18 0600   Gross per 24 hour   Intake             1180 ml   Output             1400 ml   Net             -220 ml     Last 3 shifts:     190 -  0700  In: 1180 [I.V.:1180]  Out: 1400 [Urine:1400]    General:   Alert, cooperative, no acute distress   HEENT:  Atraumatic, Oral mucosa normal, Conjunctivae clear   Neck:  Supple, trachea midline, no adenopathy   No JVD   Lungs:   Clear to auscultation bilaterally    Heart:   Regular rhythm, no murmur   Abdomen:    Soft, Tender to light palpation on the right upper and lower quadrant. Positive rebound.    No masses or organomegaly    Extremities:  No edema or DVT signs   Skin:  Warm and dry    No rashes or lesions   Neurologic:  Oriented to self and situation, not to time, place   No focal deficits     Ventilator Settings:              Data Review:      Telemetry NSR    Recent Labs      18   0454  18   0006  18   1718   WBC   --   14.6*  19.2*   HGB  12.7  12.8  14.4   HCT  39.2  39.2  43.3   PLT   --   232  281     Recent Labs      18   0006  18   1718   NA  135*  135*   K  3.6  3.7   CL  102  99   CO2  25  25   GLU  190*  187*   BUN  7  7   CREA  0.88  0.93   CA  8.3*  9.5   MG  1.6   --    PHOS   --   3.1   ALB  2.9*  3.6   SGOT  14*  19   ALT  21  24     Medications reviewed  Current Facility-Administered Medications   Medication Dose Route Frequency    pantoprazole (PROTONIX) 40 mg in sodium chloride 0.9% 10 mL injection  40 mg IntraVENous Q12H    sodium chloride (NS) flush 5-10 mL  5-10 mL IntraVENous PRN    cefepime (MAXIPIME) 2 g in 0.9% sodium chloride (MBP/ADV) 100 mL  2 g IntraVENous Q8H    sodium chloride (NS) flush 5-10 mL  5-10 mL IntraVENous Q8H    sodium chloride (NS) flush 5-10 mL  5-10 mL IntraVENous PRN    0.9% sodium chloride infusion  100 mL/hr IntraVENous CONTINUOUS    naloxone (NARCAN) injection 0.4 mg  0.4 mg IntraVENous PRN    ondansetron (ZOFRAN) injection 4 mg  4 mg IntraVENous Q6H PRN    levoFLOXacin (LEVAQUIN) 750 mg in D5W IVPB  750 mg IntraVENous Q48H    metroNIDAZOLE (FLAGYL) IVPB premix 500 mg  500 mg IntraVENous Q12H    HYDROmorphone (DILAUDID) injection 0.5 mg  0.5 mg IntraVENous Q6H PRN         Signed:   Kellie Webb MD   Resident, Family Medicine      Attending note: Attending note to follow. ..

## 2018-04-30 NOTE — PROGRESS NOTES
[address] [facility]   Senior Resident Admission Note    CC: Abdominal Pain     HPI:  Kristi Jain is a 80 y.o. female with a PMH dementia, GERD, UTIs here for abdominal pain. Reports abdominal pain started this morning and is associated with nausea/vomiting (nonbloody, nonbilious x 1), loss of appetite, generalized weakness, chills. Denies fever, chest pain, sob, dysuria, urgency, diarrhea. Recently treated with cipro for urine culture positive for citrobacter. Pt has baseline dementia and is unable to give further details. VS in the ED: T 991, HR 95, /74, RR 28, SpO2 94% RA   Labs: WBC 19.2, Hg 14.4, Na 135, K 3.7, glucose 187, Cr 0.93, lipase normal. UA negative, LA: 3.1. Trop negative. Blood cultures drawn in the ED. EKG NSR and PACs   CT abd/pelvis with contrast showed sigmoid diverticulosis and thickened segment of sigmoid colon. RUQ abdominal ultrasound negative. CXR negative. Pt was treated with morphine 4mg and 1.7L NS bolus, and cefepime. Chart reviewed. Patient seen, examined, and discussed with Dr. Krupa Goodson (PGY-1). See his note for more details. Visit Vitals    /63    Pulse 104    Temp 99.1    Resp 18    Ht 5' 3\" (1.6 m)    Wt 130 lb (59 kg)    LMP 01/31/1995    SpO2 95%    BMI 23.03 kg/m2     General: NAD, speaking in complete sentences, pleasantly demented  Cardiac: +Tachycardic, regular rhythm, normal s1, s2 heard  Lungs: CTAB   Abdomen: +BS, lower abdomen slightly distended/full. +guarding, + rebound, diffuse tenderness to palpation particularly in the lower central portion of the abdomen. Neuro: CN II-XII grossly intact. AO to self and place. Extremities: 2+ pulses, no LARA, cyanosis, calf tenderness    A/P: 80year old female with dementia presenting with abdominal pain, nausea and vomiting admitted for sepsis and concern for acute abdomen.     1. Sepsis likely 2/2 intra-abdominal process: 3/4 SIRS on admission and acute abdomen on exam. CT findings significant for sigmoid diverticulosis and a segment of thickened sigmoid colon in conjunction with elevated LA. General surgery was consulted stat after we examined the patient. Discussed the patient's presentation, clinical picture, labs, imaging findings and concern for mesenteric ischemia with Dr. Abel Anna who recommended admission to ICU, bowel rest, broad spectrum antibiotics, continue to trend lactic acids and check ABG in morning. Dr. Abel Anna stated that he will review imaging personally and call back if he has any other recommendations; did not recommend therapeutic heparin/lovenox.   -Admit to ICU  -NPO, zofran PRN, pain control with Dilaudid   -Levaquin, cefepime and flagyl (allergic to penicillin)  -Trend lactic acid q4hr, ABG in AM   -F/U blood cultures   -F/U further surgery recommendations  -Serial abdominal exams q4hr, will inform surgery immediately in case of any change in patient's status    Pt is full code. 's phone number: [phone]. I agree with remaining assessment and plan as documented in Dr. Jiang Gip note.     Pt seen and discussed with Dr. Andie Castaneda (on-call attending physician)    Kamilah Medeiros MD  Family Medicine Resident

## 2018-04-30 NOTE — MED STUDENT NOTES
*ATTENTION:  This note has been created by a medical student for educational purposes only. Please do not refer to the content of this note for clinical decision-making, billing, or other purposes. Please see attending physicians note to obtain clinical information on this patient. *         [facility]   Daily Progress Note    Date: 4/30/2018    Assessment/Plan:   Romana Vinson is a 80 y.o. female who is hospitalized for acute abdominal pain and 4/4 SIRS concerning for sepsis with a history of dementia, UTI, and GERD. Abdominal Pain with Sepsis - concern for divertiuclitis vs. Mesenteric ischemia. Sepsis likely 2/2 intrabdominal process. Sudden onset pain with + FOBT concerning for ischemic colitis along with findings of thickened sigmoid on CT. Also could be acute diverticulitis in the setting of multiple diverticula visualized on CT abdomen  - Continue IVF  - NPO, strict I/O, Zofran prn  - Continue dilaudid for pain control  - Consult surgery and GI to evaluate the patient  - Serial abdominal exams  - H&H q6h  - Trending lactate, 1.7 this morning  - Continue Cefepime, levaquin, metronidazole  - Lovenox not indicated at this time    Hyponatremia - mild on admission, sodium 135  - continue IVF     Urinary retention - 800 cc overnight with straight cath, no medications at home  - Ryder catheter in place    Dementia - not on any home medications  - Continue to monitor mental status  - Frequent reorientation, lights on, limit sedating medications    GERD - not on any medications at home, well-controlled  - Continue to monitor  - GI ppx with Protonix    Pulmonary Nodule - found on CT  - Outpatient follow up     FEN/GI - NPO. NS at 100 mL/hr. Activity - with assisstance  DVT prophylaxis - SCDs  GI prophylaxis -  Protonix  Disposition - Plan to d/c to home.  Needs to be evaluated by surgery and GI.    CODE STATUS:  FULL CODE    Patient discussed with Dr. Marie Sapp Anant, M4  Family Medicine          CC: \"I want some water, my mouth is dry\"    Subjective  Patient had 800 cc of urinary rentention overnight and a denise catheter was placed. Patient was admitted yesterday for sudden abdominal pain. Patient continues to endorse abdominal pain and is uncomfortable in her bed. This morning she is confused and disoriented, repeatedly asks for water even after told she cannot have any at this time. Per chart review patient has a history of short term memory loss confirmed by her family. Denies chills, headaches, chest pain, shortness of breath, palpitations, and LE edema.        Inpatient Medications  Current Facility-Administered Medications   Medication Dose Route Frequency    pantoprazole (PROTONIX) 40 mg in sodium chloride 0.9% 10 mL injection  40 mg IntraVENous Q12H    sodium chloride (NS) flush 5-10 mL  5-10 mL IntraVENous PRN    cefepime (MAXIPIME) 2 g in 0.9% sodium chloride (MBP/ADV) 100 mL  2 g IntraVENous Q8H    sodium chloride (NS) flush 5-10 mL  5-10 mL IntraVENous Q8H    sodium chloride (NS) flush 5-10 mL  5-10 mL IntraVENous PRN    0.9% sodium chloride infusion  100 mL/hr IntraVENous CONTINUOUS    naloxone (NARCAN) injection 0.4 mg  0.4 mg IntraVENous PRN    ondansetron (ZOFRAN) injection 4 mg  4 mg IntraVENous Q6H PRN    levoFLOXacin (LEVAQUIN) 750 mg in D5W IVPB  750 mg IntraVENous Q48H    metroNIDAZOLE (FLAGYL) IVPB premix 500 mg  500 mg IntraVENous Q12H    HYDROmorphone (DILAUDID) injection 0.5 mg  0.5 mg IntraVENous Q6H PRN         Allergies  Allergies   Allergen Reactions    Macrodantin [Nitrofurantoin Macrocrystalline] Rash    Pcn [Penicillins] Rash    Sulfa (Sulfonamide Antibiotics) Rash         Objective  Vitals:  Patient Vitals for the past 8 hrs:   Temp Pulse Resp BP SpO2   04/30/18 0600 - 90 17 117/59 97 %   04/30/18 0500 - 91 20 119/56 95 %   04/30/18 0400 - 97 24 124/62 96 %   04/30/18 0300 99.5 °F (37.5 °C) (!) 104 19 128/84 94 %   04/30/18 0200 - 94 29 125/56 94 %   04/30/18 0100 - 97 29 129/49 96 %   04/30/18 0000 - - - 135/76 -   04/29/18 2345 - 95 - - -   04/29/18 2300 99 °F (37.2 °C) 95 30 146/52 95 %         I/O:    Intake/Output Summary (Last 24 hours) at 04/30/18 0640  Last data filed at 04/30/18 0600   Gross per 24 hour   Intake             1180 ml   Output             1400 ml   Net             -220 ml     Last shift:    04/29 1901 - 04/30 0700  In: 1180 [I.V.:1180]  Out: 800 [Urine:800]  Last 3 shifts:    04/28 0701 - 04/29 1900  In: -   Out: 600 [Urine:600]    Physical Exam:  General: Appears uncomfortable and confused in bed. Alert. Not oriented to place, date, or current events. Asked the same question multiple times. Cooperative. Head: Normocephalic. Atraumatic. Respiratory: CTAB. No w/r/r/c.   Cardiovascular: RRR. Normal S1,S2. No m/r/g. Pulses 2+ throughout. GI: decreased bowel sounds throughout. Diffusely tender to palpation. Hypersensitive in right lower quadrant and suprapubic region. Mild guarding. Mild distention. Extremities: No edema. No tenderness.      Laboratory Data  Recent Results (from the past 12 hour(s))   LACTIC ACID    Collection Time: 04/29/18  8:19 PM   Result Value Ref Range    Lactic acid 2.8 (HH) 0.4 - 2.0 MMOL/L   EKG, 12 LEAD, INITIAL    Collection Time: 04/29/18  8:22 PM   Result Value Ref Range    Ventricular Rate 98 BPM    Atrial Rate 98 BPM    P-R Interval 142 ms    QRS Duration 68 ms    Q-T Interval 354 ms    QTC Calculation (Bezet) 451 ms    Calculated P Axis 11 degrees    Calculated R Axis -24 degrees    Calculated T Axis 65 degrees    Diagnosis       Sinus rhythm with premature atrial complexes  Septal infarct , age undetermined  Abnormal ECG  When compared with ECG of 30-JUN-2010 12:06,  premature atrial complexes are now present  Septal infarct is now present     OCCULT BLOOD, STOOL    Collection Time: 04/29/18  9:45 PM   Result Value Ref Range    Occult blood, stool POSITIVE (A) NEG     LACTIC ACID    Collection Time: 04/30/18 12:06 AM   Result Value Ref Range    Lactic acid 2.5 (HH) 0.4 - 2.0 MMOL/L   METABOLIC PANEL, COMPREHENSIVE    Collection Time: 04/30/18 12:06 AM   Result Value Ref Range    Sodium 135 (L) 136 - 145 mmol/L    Potassium 3.6 3.5 - 5.1 mmol/L    Chloride 102 97 - 108 mmol/L    CO2 25 21 - 32 mmol/L    Anion gap 8 5 - 15 mmol/L    Glucose 190 (H) 65 - 100 mg/dL    BUN 7 6 - 20 MG/DL    Creatinine 0.88 0.55 - 1.02 MG/DL    BUN/Creatinine ratio 8 (L) 12 - 20      GFR est AA >60 >60 ml/min/1.73m2    GFR est non-AA >60 >60 ml/min/1.73m2    Calcium 8.3 (L) 8.5 - 10.1 MG/DL    Bilirubin, total 0.8 0.2 - 1.0 MG/DL    ALT (SGPT) 21 12 - 78 U/L    AST (SGOT) 14 (L) 15 - 37 U/L    Alk. phosphatase 61 45 - 117 U/L    Protein, total 6.5 6.4 - 8.2 g/dL    Albumin 2.9 (L) 3.5 - 5.0 g/dL    Globulin 3.6 2.0 - 4.0 g/dL    A-G Ratio 0.8 (L) 1.1 - 2.2     LIPID PANEL    Collection Time: 04/30/18 12:06 AM   Result Value Ref Range    LIPID PROFILE          Cholesterol, total 150 <200 MG/DL    Triglyceride 37 <150 MG/DL    HDL Cholesterol 53 MG/DL    LDL, calculated 89.6 0 - 100 MG/DL    VLDL, calculated 7.4 MG/DL    CHOL/HDL Ratio 2.8 0 - 5.0     CBC WITH AUTOMATED DIFF    Collection Time: 04/30/18 12:06 AM   Result Value Ref Range    WBC 14.6 (H) 3.6 - 11.0 K/uL    RBC 4.34 3.80 - 5.20 M/uL    HGB 12.8 11.5 - 16.0 g/dL    HCT 39.2 35.0 - 47.0 %    MCV 90.3 80.0 - 99.0 FL    MCH 29.5 26.0 - 34.0 PG    MCHC 32.7 30.0 - 36.5 g/dL    RDW 12.8 11.5 - 14.5 %    PLATELET 246 466 - 289 K/uL    MPV 10.9 8.9 - 12.9 FL    NRBC 0.0 0  WBC    ABSOLUTE NRBC 0.00 0.00 - 0.01 K/uL    NEUTROPHILS 86 (H) 32 - 75 %    BAND NEUTROPHILS 4 0 - 6 %    LYMPHOCYTES 6 (L) 12 - 49 %    MONOCYTES 4 (L) 5 - 13 %    EOSINOPHILS 0 0 - 7 %    BASOPHILS 0 0 - 1 %    IMMATURE GRANULOCYTES 0 %    ABS. NEUTROPHILS 13.1 (H) 1.8 - 8.0 K/UL    ABS. LYMPHOCYTES 0.9 0.8 - 3.5 K/UL    ABS. MONOCYTES 0.6 0.0 - 1.0 K/UL    ABS.  EOSINOPHILS 0.0 0.0 - 0.4 K/UL    ABS. BASOPHILS 0.0 0.0 - 0.1 K/UL    ABS. IMM.  GRANS. 0.0 K/UL    DF MANUAL      RBC COMMENTS NORMOCYTIC, NORMOCHROMIC     MAGNESIUM    Collection Time: 04/30/18 12:06 AM   Result Value Ref Range    Magnesium 1.6 1.6 - 2.4 mg/dL   VENOUS BLOOD GAS    Collection Time: 04/30/18  4:40 AM   Result Value Ref Range    VENOUS PH 7.45 (H) 7.32 - 7.42      VENOUS PCO2 29 (L) 41 - 51 mmHg    VENOUS PO2 38 25 - 40 mmHg    VENOUS O2 SATURATION 76 65 - 88 %    VENOUS BICARBONATE 20 (L) 23 - 28 mmol/L    VENOUS BASE DEFICIT 3.0 mmol/L    O2 METHOD ROOM AIR      FIO2 21 %    Sample source VENOUS      SITE OTHER     LACTIC ACID    Collection Time: 04/30/18  4:54 AM   Result Value Ref Range    Lactic acid 1.7 0.4 - 2.0 MMOL/L   HGB & HCT    Collection Time: 04/30/18  4:54 AM   Result Value Ref Range    HGB 12.7 11.5 - 16.0 g/dL    HCT 39.2 35.0 - 47.0 %   TYPE & SCREEN    Collection Time: 04/30/18  5:03 AM   Result Value Ref Range    Crossmatch Expiration 05/03/2018     ABO/Rh(D) O POSITIVE     Antibody screen NEG        Imaging  CT abdomen/pelvis: multiple sigmoid diverticula, moderate ascites, 7 cm thickened segment of sigmoid colon, Right middle lobe 5 mm micronodule warrants future follow up  CXR: no acute process  Abd U/S: no acute process      Hospital Problems:  Hospital Problems  Date Reviewed: 4/11/2018          Codes Class Noted POA    Abdominal pain ICD-10-CM: R10.9  ICD-9-CM: 789.00  4/29/2018 Unknown

## 2018-04-30 NOTE — PROGRESS NOTES
2217- Residents called, informed of patient actively vomiting, with zofran on board, at this time they do not want an NG tube. 2347- Patient is receiving her antibiotics, patient is alert, oriented to self, knows that she is in the hospital setting. Family was at bedside but have gone home. Pt states that patient has extreme short term memory loss. Pt is very jumpy and gets overstimulated easily. Hypersensitivity noted. Easton.Pavan OTTO came to bedside and did fecal specimen. Lyndon.Anu- MD resident notified of Lactic acid and positive stool blood test.     0342- Called resident to notify of patient bladder having 850, patient attempted to urinate on bedside commode without success. Pt placed back in bed. Ryder catheter order obtained, patient emptied 800 ml.     0546-type and cross completed. Lactic acid drawn. Bedside shift change report given to Polina Pringle (oncoming nurse) by Yevgeniy Hawkins RN (offgoing nurse). Report included the following information SBAR, Kardex, Recent Results, Med Rec Status, Cardiac Rhythm SR and Alarm Parameters .

## 2018-04-30 NOTE — CONSULTS
[address] [place] [address]. [address] [place] [address] NP  [phone]                    GASTROENTEROLOGY CONSULTATION NOTE              NAME:  Joshua Camargo   :   1934   MRN:   [id number]       Referring Physician:    Dr. Gregory Bourne Date:   2018 8:55 AM    Chief Complaint:    Abdominal Pain     History of Present Illness:    Patient is a 80 y.o. who we were asked to see in consultation for abdominal pain at the request for Dr. Robert Estrella.  Of note the patient has dementia and no family was with her at the bedside. She complains of left and right lower quadrant abdominal pain which started 4 days ago. She describes it as persistent and aching, and it is worse when she eats. There are no relieving factors. She denies nausea, vomiting and diarrhea. Denies feeling feverish but endorses chills. She had been having normal bowel movements prior to the onset of pain. PMH:  Past Medical History:   Diagnosis Date    Alzheimer disease     Anxiety 2010    GERD (gastroesophageal reflux disease) 2010    Mild dementia     UTI (lower urinary tract infection) 2010       PSH:  Past Surgical History:   Procedure Laterality Date    ENDOSCOPY, COLON, DIAGNOSTIC      HX CATARACT REMOVAL  2011    left    HX UROLOGICAL      bladder uplift       Allergies: Allergies   Allergen Reactions    Macrodantin [Nitrofurantoin Macrocrystalline] Rash    Pcn [Penicillins] Rash    Sulfa (Sulfonamide Antibiotics) Rash       Home Medications:  Prior to Admission Medications   Prescriptions Last Dose Informant Patient Reported? Taking?   cranberry extract 450 mg tab tablet 2018 at AM  Yes Yes   Sig: Take 450 mg by mouth daily.       Facility-Administered Medications: None       Hospital Medications:  Current Facility-Administered Medications   Medication Dose Route Frequency    pantoprazole (PROTONIX) 40 mg in sodium chloride 0.9% 10 mL injection  40 mg IntraVENous Q12H    sodium chloride (NS) flush 5-10 mL  5-10 mL IntraVENous PRN    cefepime (MAXIPIME) 2 g in 0.9% sodium chloride (MBP/ADV) 100 mL  2 g IntraVENous Q8H    sodium chloride (NS) flush 5-10 mL  5-10 mL IntraVENous Q8H    sodium chloride (NS) flush 5-10 mL  5-10 mL IntraVENous PRN    0.9% sodium chloride infusion  100 mL/hr IntraVENous CONTINUOUS    naloxone (NARCAN) injection 0.4 mg  0.4 mg IntraVENous PRN    ondansetron (ZOFRAN) injection 4 mg  4 mg IntraVENous Q6H PRN    levoFLOXacin (LEVAQUIN) 750 mg in D5W IVPB  750 mg IntraVENous Q48H    metroNIDAZOLE (FLAGYL) IVPB premix 500 mg  500 mg IntraVENous Q12H    HYDROmorphone (DILAUDID) injection 0.5 mg  0.5 mg IntraVENous Q6H PRN       Social History:  Social History   Substance Use Topics    Smoking status: Never Smoker    Smokeless tobacco: Never Used    Alcohol use No       Family History:  Family History   Problem Relation Age of Onset    Cancer Father        Review of Systems:  Constitutional: negative fever, positive chills, negative weight loss  Eyes:   negative visual changes  ENT:   negative sore throat, tongue or lip swelling  Respiratory:  negative cough, negative dyspnea  Cards:  negative for chest pain, palpitations, lower extremity edema  GI:   See HPI  :  negative for frequency, dysuria  Integument:  negative for rash and pruritus  Heme:  negative for easy bruising and gum/nose bleeding  Musculoskel: negative for myalgias,  back pain and muscle weakness  Neuro: negative for headaches, dizziness, vertigo  Psych:  negative for feelings of anxiety, depression     Objective:   Patient Vitals for the past 8 hrs:   BP Temp Pulse Resp SpO2   04/30/18 0800 124/54 - 87 23 96 %   04/30/18 0755 - - - - 97 %   04/30/18 0700 118/48 99.5 °F (37.5 °C) 87 25 94 %   04/30/18 0600 117/59 - 90 17 97 %   04/30/18 0500 119/56 - 91 20 95 %   04/30/18 0400 124/62 - 97 24 96 %   04/30/18 0300 128/84 99.5 °F (37.5 °C) (!) 104 19 94 %   04/30/18 0200 125/56 - 94 29 94 %   04/30/18 0100 129/49 - 97 29 96 %        04/28 1901 - 04/30 0700  In: 1180 [I.V.:1180]  Out: 1400 [Urine:1400]    EXAM:     NEURO-alert, oriented x3, affect appropriate   HEENT-Head: Normocephalic, no lesions, without obvious abnormality. LUNGS-clear to auscultation bilaterally    COR-regular rate and rhythym     ABD- Firm, diffusely tender to palpation. Bowel sounds hypoactive. EXT-no edema    Skin - No rash     Data Review     Recent Labs      04/30/18   0454  04/30/18   0006  04/29/18   1718   WBC   --   14.6*  19.2*   HGB  12.7  12.8  14.4   HCT  39.2  39.2  43.3   PLT   --   232  281     Recent Labs      04/30/18   0006  04/29/18   1718   NA  135*  135*   K  3.6  3.7   CL  102  99   CO2  25  25   BUN  7  7   CREA  0.88  0.93   GLU  190*  187*   PHOS   --   3.1   CA  8.3*  9.5     Recent Labs      04/30/18   0006  04/29/18   1718   SGOT  14*  19   AP  61  81   TP  6.5  7.7   ALB  2.9*  3.6   GLOB  3.6  4.1*   LPSE   --   51*     No results for input(s): INR, PTP, APTT in the last 72 hours. No lab exists for component: INREXT    Patient Active Problem List   Diagnosis Code    Anxiety F41.9    GERD (gastroesophageal reflux disease) K21.9    Dizziness R42    Nonspecific abnormal electrocardiogram (ECG) (EKG) R94.31    Urinary urgency R39.15    Fatigue R53.83    Dementia without behavioral disturbance F03.90    Memory loss R41.3    Abdominal pain R10.9    Prediabetes R73.03    SIRS (systemic inflammatory response syndrome) (HCC) R65.10       Assessment and Plan:  Abdominal Pain:  CT scan shows findings of \"Findings are suggestive of acute appendicitis with contained perforation. There  is no evidence for abscess or drainable collection. \"  - Continue NPO.  - Continue IV ABX.  - Continue IVF. General Surgery is taking her to the OR today. Will sign off. Please call with any questions or concerns. Thanks for allowing me to participate in the care of this patient.   Signed By: Naresh Aggarwal NP 4/30/2018  8:55 AM

## 2018-04-30 NOTE — PROGRESS NOTES
UPDATE:    -FOBT + in the setting of abdominal pain: Consulted GI this morning. Spoke with Dr. Nichol Hooks.  He will see patient today    Erick Cervantes MD  Family Medicine Resident  PGY-1

## 2018-05-01 NOTE — PROGRESS NOTES
Bedside and Verbal shift change report given to Emilia Mendoza RN  (oncoming nurse) by Shanika Olguin RN  (offgoing nurse). Report included the following information SBAR, Kardex, Intake/Output, MAR and Recent Results. 2330: VS stable. Patient assessed. Blood sugar done at this time. 0000: Labs drawn. VS remain stable. 0200: VS Stable. Patient resting comfortably. 0400: Labs drawn. Patient complained of some pain. Patient medicated with Dilaudid 0.5 mg IV.   0500: Patient repositioned. Patient states that pain was a lot better. Bedside and Verbal shift change report given to Amanda Randhawa RN  (oncoming nurse) by Emilia Mendoza RN  (offgoing nurse). Report included the following information SBAR, Kardex, Intake/Output, MAR and Recent Results.

## 2018-05-01 NOTE — PERIOP NOTES
TRANSFER - OUT REPORT:    Verbal report given to Lexa Bishop RN (name) on Kirsten Cai  being transferred to [phone] (unit) for routine post - op       Report consisted of patients Situation, Background, Assessment and   Recommendations(SBAR). Information from the following report(s) SBAR, Kardex, Intake/Output, MAR, Recent Results and Cardiac Rhythm SR/ST was reviewed with the receiving nurse. Lines:   Peripheral IV 04/29/18 Left; Inner Antecubital (Active)   Site Assessment Clean, dry, & intact 4/30/2018  9:19 PM   Phlebitis Assessment 0 4/30/2018  9:19 PM   Infiltration Assessment 0 4/30/2018  9:19 PM   Dressing Status Clean, dry, & intact 4/30/2018  9:19 PM   Dressing Type Transparent;Tape 4/30/2018  9:19 PM   Hub Color/Line Status Pink 4/30/2018  9:19 PM   Action Taken Open ports on tubing capped 4/30/2018  3:00 AM   Alcohol Cap Used Yes 4/30/2018  9:19 PM       Peripheral IV 04/29/18 Right Forearm (Active)   Site Assessment Clean, dry, & intact 4/30/2018  9:19 PM   Phlebitis Assessment 0 4/30/2018  9:19 PM   Infiltration Assessment 0 4/30/2018  9:19 PM   Dressing Status Clean, dry, & intact 4/30/2018  9:19 PM   Dressing Type Transparent;Tape 4/30/2018  9:19 PM   Hub Color/Line Status Pink 4/30/2018  9:19 PM   Action Taken Open ports on tubing capped 4/30/2018  3:00 AM   Alcohol Cap Used Yes 4/30/2018  9:19 PM        Opportunity for questions and clarification was provided.       Patient transported with:   Monitor  Registered Nurse

## 2018-05-01 NOTE — PROGRESS NOTES
0265: Bedside shift change report given to Escobar Bryan and Ayde Vinson (oncoming nurse) by Angelo See (offgoing nurse). Report included the following information SBAR, Kardex, ED Summary, OR Summary, Procedure Summary and MAR.   0715: Initial assessment complete. Patient A&Ox1 (to self only). VSS. Call bell within reach. Bed in locked position. No distress noted. Patient denies pain at this time. No additional needs expressed. Will continue to monitor patient. 0800: Family Practice physician paged at this time. Patient requesting water but is NPO status. [facility] states to defer to intensivist/surgeon regarding diet status. 6696: Surgery PA, Alaina Rodríguez, at bedside evaluating patient. 0920: Morning medication administered (see MAR). Patient set up for breakfast. Call bell within reach. VSS. No additional needs at this time. Will continue to monitor patient. 1100: TRANSFER - OUT REPORT:    Verbal report given to VANDANA Fernandez (name) on Kristi Jain  being transferred to Med Surg (unit) for routine progression of care       Report consisted of patients Situation, Background, Assessment and   Recommendations(SBAR). Information from the following report(s) SBAR, Kardex, OR Summary, Procedure Summary and MAR was reviewed with the receiving nurse. Lines:   Peripheral IV 04/29/18 Left; Inner Antecubital (Active)   Site Assessment Clean, dry, & intact 5/1/2018  7:22 AM   Phlebitis Assessment 0 5/1/2018  7:22 AM   Infiltration Assessment 0 5/1/2018  7:22 AM   Dressing Status Clean, dry, & intact 5/1/2018  7:22 AM   Dressing Type Tape;Transparent 5/1/2018  7:22 AM   Hub Color/Line Status Pink;Flushed;End cap changed 5/1/2018  7:22 AM   Action Taken Open ports on tubing capped 5/1/2018  7:22 AM   Alcohol Cap Used Yes 5/1/2018  7:22 AM       Peripheral IV 04/29/18 Right Forearm (Active)   Site Assessment Clean, dry, & intact 5/1/2018  7:22 AM   Phlebitis Assessment 0 5/1/2018  7:22 AM   Infiltration Assessment 0 5/1/2018  7:22 AM   Dressing Status Clean, dry, & intact 5/1/2018  7:22 AM   Dressing Type Tape;Transparent 5/1/2018  7:22 AM   Hub Color/Line Status Pink; Infusing 5/1/2018  7:22 AM   Action Taken Open ports on tubing capped 5/1/2018  7:22 AM   Alcohol Cap Used Yes 5/1/2018  7:22 AM        Opportunity for questions and clarification was provided. Patient transported with:   O2 @ 2 liters    1200:Patient transferred to Med Surg with RN and tech via stretcher at this time.

## 2018-05-01 NOTE — PROGRESS NOTES
Primary Nurse Tracey Paul RN and Darrel Renee RN performed a dual skin assessment on this patient No impairment noted  Dimitris score is 20

## 2018-05-01 NOTE — PROGRESS NOTES
General Surgery Daily Progress Note    Patient: Kirsten Cai MRN: [id number]  SSN: [id number]    YOB: 1934  Age: 80 y.o. Sex: female      Admit Date: 4/29/2018    Subjective:   C/o abdominal pain. Denies N/V. She is pleasantly confused and does not recall she had surgery. Per RN, no pressor need overnight, patient has been cooperative.      Current Facility-Administered Medications   Medication Dose Route Frequency    pantoprazole (PROTONIX) 40 mg in sodium chloride 0.9% 10 mL injection  40 mg IntraVENous Q12H    glucose chewable tablet 16 g  4 Tab Oral PRN    dextrose (D50W) injection syrg 12.5-25 g  12.5-25 g IntraVENous PRN    glucagon (GLUCAGEN) injection 1 mg  1 mg IntraMUSCular PRN    insulin lispro (HUMALOG) injection   SubCUTAneous Q6H    sodium chloride (NS) flush 5-10 mL  5-10 mL IntraVENous PRN    cefepime (MAXIPIME) 2 g in 0.9% sodium chloride (MBP/ADV) 100 mL  2 g IntraVENous Q8H    sodium chloride (NS) flush 5-10 mL  5-10 mL IntraVENous Q8H    sodium chloride (NS) flush 5-10 mL  5-10 mL IntraVENous PRN    0.9% sodium chloride infusion  100 mL/hr IntraVENous CONTINUOUS    naloxone (NARCAN) injection 0.4 mg  0.4 mg IntraVENous PRN    ondansetron (ZOFRAN) injection 4 mg  4 mg IntraVENous Q6H PRN    metroNIDAZOLE (FLAGYL) IVPB premix 500 mg  500 mg IntraVENous Q12H    HYDROmorphone (DILAUDID) injection 0.5 mg  0.5 mg IntraVENous Q6H PRN        Objective:   05/01 0701 - 05/01 1900  In: 400 [I.V.:400]  Out: 50 [Urine:50]  04/29 1901 - 05/01 0700  In: 1148 [I.V.:6230]  Out: 3870 [Urine:2420; Drains:210]  Patient Vitals for the past 8 hrs:   BP Temp Pulse Resp SpO2   05/01/18 0900 115/53 - 81 19 96 %   05/01/18 0800 119/51 - 90 25 96 %   05/01/18 0701 - - 95 - -   05/01/18 0700 128/69 97.2 °F (36.2 °C) 100 26 96 %   05/01/18 0500 126/60 - 96 19 96 %   05/01/18 0400 130/62 98.7 °F (37.1 °C) 91 21 96 %   05/01/18 0300 136/59 - 89 25 96 %       Physical Exam:  General: Alert, cooperative, confused  Lungs: Unlabored  Heart:  Regular rate and  rhythm  Abdomen: Soft, ATTP, distended, quiet Incisions c/d/i. MARCUS drain SS. Extremities: Warm, moves all, no edema  Skin:  Warm and dry, no rash    Labs: Recent Labs      05/01/18   0402   WBC  10.5   HGB  11.4*   HCT  34.8*   PLT  203     Recent Labs      05/01/18   0402   04/29/18   1718   NA  136   < >  135*   K  3.6   < >  3.7   CL  106   < >  99   CO2  21   < >  25   GLU  152*   < >  187*   BUN  10   < >  7   CREA  0.70   < >  0.93   CA  8.0*   < >  9.5   MG  1.5*   < >   --    PHOS   --    --   3.1   ALB  2.1*   < >  3.6   TBILI  0.9   < >  0.8   SGOT  14*   < >  19   ALT  14   < >  24    < > = values in this interval not displayed. Assessment / Plan:   · POD#1 lap appendectomy for perforated appendicitis  · Abdominal exam appropriate, good UOP, HD stable  · May have small quantity clears as tolerated. Anticipate period of ileus. · Continue ABX  · Replete Mg and K  · Keep denise  · Scheduled Tylenol and low-dose Toradol.  PRN Dilaudid  · PT/OT, encourage IS  · OK to transfer to remote tele

## 2018-05-01 NOTE — PROGRESS NOTES
[facility]  [facility]. [facility]       Resident Post Op Note    S: Pt is s/p laparoscopic appendectomy. EBL 50cc and an intra-abdominal drain was left in place per Op note. Significant findings, \"Purulent exudate covering the peritoneum of the right lower quadrant, ileum and sigmoid colon. The sigmoid colon was thickened and contained numerous diverticula without perforation or fecal peritoneal contamination. The tip of the appendix was adherent to the sigmoid colon and appeared to have perforated, the remainder of the appendix appeared normal.\" Patient seen and examined at bedside. Pt states that pain is well controlled at this time. She denies HA, dizziness, SOB, CP and N/V. No other complaints this time. O:  Visit Vitals    /59 (BP 1 Location: Right arm, BP Patient Position: At rest)    Pulse (!) 102    Temp 98.6 °F (37 °C)    Resp 24    Ht 5' 3\" (1.6 m)    Wt 130 lb (59 kg)    LMP 01/31/1995    SpO2 98%    BMI 23.03 kg/m2     Physical Examination:   General appearance - alert, well appearing, and in no distress, demented. Chest - clear to auscultation, good air movement. Heart - tachycardia, regular rhythm, normal S1, S2  Abdomen - +BS, appropriate tenderness, drain in place (draining serosanguinous fluid), incisions clean, dry and intact. Bandage clean and dry. Neurological - alert, oriented only to self. Extremities - peripheral pulses normal, no pedal edema, no clubbing or cyanosis    A/P:   Pt is a 81 y/o F who is s/p lap appendectomy. - Encourage IS use  - Daily dressing changes.   - f/u body fuild cx and f/u blood cx.  - Monitor Vitals per unit protocol  - Continue routine post op care       Johan Fu MD  Family Medicine Resident

## 2018-05-01 NOTE — CONSULTS
[facility]     Name: Garth Parker MRN: [id number]   : 1934 Hospital: [address]   Date: 2018        Impression Plan   1. Acute appendicitis with contained perforation  2. Sepsis  3. Pulmonary nodule               · Routine post-opt management as per surgery  · Cefepime/flagly  · Follow-up cultures  · Pain control  · Can follow-up nodule as an outpatient if the family is interested in serially imaging  · PPI  · Clear liquid    Stable for transfer out of the ICU       Radiology  ( personally reviewed) CT abdomen pelvis with a 5mm nodule in the RML   ABG No results for input(s): PHI, PO2I, PCO2I in the last 72 hours. Subjective     Underwent appendectomy yesterday. Reports abdominal pain, but thinks it is improved but also has no recollection that she had appendicitis or underwent surgery yesterday. Otherwise no complaints. Review of Systems:  A comprehensive review of systems was negative except for that written in the HPI. Past Medical History:   Diagnosis Date    Alzheimer disease     Anxiety 2010    GERD (gastroesophageal reflux disease) 2010    Mild dementia     UTI (lower urinary tract infection) 2010      Past Surgical History:   Procedure Laterality Date    ENDOSCOPY, COLON, DIAGNOSTIC      HX CATARACT REMOVAL  2011    left    HX UROLOGICAL      bladder uplift      Prior to Admission medications    Medication Sig Start Date End Date Taking? Authorizing Provider   cranberry extract 450 mg tab tablet Take 450 mg by mouth daily.    Yes Historical Provider     Current Facility-Administered Medications   Medication Dose Route Frequency    pantoprazole (PROTONIX) 40 mg in sodium chloride 0.9% 10 mL injection  40 mg IntraVENous Q12H    insulin lispro (HUMALOG) injection   SubCUTAneous Q6H    cefepime (MAXIPIME) 2 g in 0.9% sodium chloride (MBP/ADV) 100 mL  2 g IntraVENous Q8H    sodium chloride (NS) flush 5-10 mL  5-10 mL IntraVENous Q8H    0.9% sodium chloride infusion  100 mL/hr IntraVENous CONTINUOUS    metroNIDAZOLE (FLAGYL) IVPB premix 500 mg  500 mg IntraVENous Q12H     Allergies   Allergen Reactions    Macrodantin [Nitrofurantoin Macrocrystalline] Rash    Pcn [Penicillins] Rash    Sulfa (Sulfonamide Antibiotics) Rash      Social History   Substance Use Topics    Smoking status: Never Smoker    Smokeless tobacco: Never Used    Alcohol use No      Family History   Problem Relation Age of Onset    Cancer Father           Laboratory: I have personally reviewed the critical care flowsheet and labs. Recent Labs      05/01/18   0402  04/30/18   2350  04/30/18   1829   04/30/18   0006  04/29/18   1718   WBC  10.5   --    --    --   14.6*  19.2*   HGB  11.4*  12.0  12.0   < >  12.8  14.4   HCT  34.8*  37.6  36.8   < >  39.2  43.3   PLT  203   --    --    --   232  281    < > = values in this interval not displayed.      Recent Labs      05/01/18   0402  04/30/18   0006  04/29/18   1718   NA  136  135*  135*   K  3.6  3.6  3.7   CL  106  102  99   CO2  21  25  25   GLU  152*  190*  187*   BUN  10  7  7   CREA  0.70  0.88  0.93   CA  8.0*  8.3*  9.5   MG   --   1.6   --    PHOS   --    --   3.1   ALB  2.1*  2.9*  3.6   SGOT  14*  14*  19   ALT  14  21  24       Objective:     Mode Rate Tidal Volume Pressure FiO2 PEEP                    Vital Signs:     TMAX(24)      Intake/Output:   Last shift:         Last 3 shifts: 05/01 0701 - 05/01 1900  In: 236.7 [I.V.:236.7]  Out: - RRIOLAST3    Intake/Output Summary (Last 24 hours) at 05/01/18 0856  Last data filed at 05/01/18 1801   Gross per 24 hour   Intake          5086.68 ml   Output             1830 ml   Net          3256.68 ml     EXAM:   GENERAL: well developed and in no distress, HEENT:  PERRL, EOMI, no alar flaring or epistaxis, oral mucosa moist without cyanosis, NECK:  no jugular vein distention, no retractions, no thyromegaly or masses, LUNGS: CTAB, respirations non-labored , HEART:  Regular rate and rhythm with no MGR; no edema is present, ABDOMEN:  Soft, diffuse tenderness to touch but improved, no rebound, EXTREMITIES:  warm with no cyanosis, SKIN:  no jaundice or ecchymosis and NEUROLOGIC:  alert and oriented, grossly non-focal    Ladoris MD Alyssa  [facility] [place]

## 2018-05-01 NOTE — PROGRESS NOTES
Patient has denise per nurse driven protocol, called family practice, keep denise in till in am per physician, will reassess in am.

## 2018-05-01 NOTE — PROGRESS NOTES
[facility]       INTENSIVIST DAILY PROGRESS NOTE  Name: Naveed Dale   : 1934   MRN: [id number]   Date: 2018 7:39 AM     I have reviewed the flowsheet and previous days notes. s/p laparoscopic appendectomy 2/ to perforation. Pt was able to maintain BP without need of pressors  Pt without pain this morning. Asking for meal        Vital Signs:    Visit Vitals    /69 (BP 1 Location: Right arm, BP Patient Position: At rest)    Pulse 100    Temp 97.2 °F (36.2 °C)    Resp 26    Ht 5' 3\" (1.6 m)    Wt 130 lb 8.2 oz (59.2 kg)    LMP 1995    SpO2 96%    BMI 23.12 kg/m2       O2 Device: Nasal cannula   O2 Flow Rate (L/min): 2 l/min   Temp (24hrs), Av.2 °F (36.8 °C), Min:97.2 °F (36.2 °C), Max:98.7 °F (37.1 °C)       Intake/Output:   Last shift:       07 -  190  In: 236.7 [I.V.:236.7]  Out: -   Last 3 shifts: 1901 -  0700  In: 7752 [I.V.:6230]  Out: 7699 [Urine:2420; Drains:210]    Intake/Output Summary (Last 24 hours) at 18 0739  Last data filed at 18 3097   Gross per 24 hour   Intake          5286.68 ml   Output             1830 ml   Net          3456.68 ml       Physical Exam:  General appearance - alert to self and place, comfortable, and in no distress  Chest - clear to auscultation, good air movement. Heart - , RRR normal S1, S2  Abdomen - +BS, appropriate tenderness, drain in place (draining serosanguinous fluid), incisions clean, dry and intact. Bandage clean and dry.   Neurological - follows commands   Extremities - peripheral pulses normal, no pedal edema, no clubbing or cyanosis    DATA:   Current Facility-Administered Medications   Medication Dose Route Frequency    pantoprazole (PROTONIX) 40 mg in sodium chloride 0.9% 10 mL injection  40 mg IntraVENous Q12H    glucose chewable tablet 16 g  4 Tab Oral PRN    dextrose (D50W) injection syrg 12.5-25 g  12.5-25 g IntraVENous PRN    glucagon (GLUCAGEN) injection 1 mg  1 mg IntraMUSCular PRN    insulin lispro (HUMALOG) injection   SubCUTAneous Q6H    sodium chloride (NS) flush 5-10 mL  5-10 mL IntraVENous PRN    cefepime (MAXIPIME) 2 g in 0.9% sodium chloride (MBP/ADV) 100 mL  2 g IntraVENous Q8H    sodium chloride (NS) flush 5-10 mL  5-10 mL IntraVENous Q8H    sodium chloride (NS) flush 5-10 mL  5-10 mL IntraVENous PRN    0.9% sodium chloride infusion  100 mL/hr IntraVENous CONTINUOUS    naloxone (NARCAN) injection 0.4 mg  0.4 mg IntraVENous PRN    ondansetron (ZOFRAN) injection 4 mg  4 mg IntraVENous Q6H PRN    metroNIDAZOLE (FLAGYL) IVPB premix 500 mg  500 mg IntraVENous Q12H    HYDROmorphone (DILAUDID) injection 0.5 mg  0.5 mg IntraVENous Q6H PRN             Labs:  Recent Labs      05/01/18   0402  04/30/18   2350  04/30/18   1829   04/30/18   0006  04/29/18   1718   WBC  10.5   --    --    --   14.6*  19.2*   HGB  11.4*  12.0  12.0   < >  12.8  14.4   HCT  34.8*  37.6  36.8   < >  39.2  43.3   PLT  203   --    --    --   232  281    < > = values in this interval not displayed. Recent Labs      05/01/18   0402  04/30/18   0006  04/29/18   1718   NA  136  135*  135*   K  3.6  3.6  3.7   CL  106  102  99   CO2  21  25  25   GLU  152*  190*  187*   BUN  10  7  7   CREA  0.70  0.88  0.93   CA  8.0*  8.3*  9.5   MG   --   1.6   --    PHOS   --    --   3.1   ALB  2.1*  2.9*  3.6   SGOT  14*  14*  19   ALT  14  21  24     Recent Labs      04/30/18   0440   FIO2  21       Imaging:  I have personally reviewed the patients radiographs and reports.        IMPRESSION:   S/p laparoscopic appendectomy for Acute Appendicitis with contained perforation   Sepsis secondary to Appendicitis  FOBT Positive  Elevated Lactic Acid: Resolved  Prediabetes  Right Middle Lung Nodule: 5 mm nodule noted, present on previous scans, no follow up required   PLAN:   Continue Broad spectrum antibiotics  Pain Control  Gen Surgery following  F/u Ucx, Bcx  Trend Hgb  Per surgery recommendation will start with CLD, monitor if pt is tolerating  May move to remote tele on 4th floor   GLOBAL ISSUES:   · Head of bed elevated  · GI Prophylaxis: Protonix  · DVT Prophylaxis: SCD (FOBT+)  · Medical Decision Maker:  of Patient Erick Mcdaniel - [phone]     My assessment/plan will be discussed with Dr. Thomas Covert, ICU Attending Physician.     Kavon Reyes, DO  Family Medicine Resident, PGY-2

## 2018-05-01 NOTE — PROGRESS NOTES
Bedside and Verbal shift change report given to Advise Only (oncoming nurse) by ELAN Sousa (offgoing nurse). Report given with SBAR, Kardex, OR Summary, Intake/Output, MAR and Recent Results.

## 2018-05-01 NOTE — PROGRESS NOTES
Problem: Falls - Risk of  Goal: *Absence of Falls  Document Abhi Fall Risk and appropriate interventions in the flowsheet. Outcome: Progressing Towards Goal  Fall Risk Interventions:  Mobility Interventions: Bed/chair exit alarm         Medication Interventions: Bed/chair exit alarm, Evaluate medications/consider consulting pharmacy    Elimination Interventions: Call light in reach, Elevated toilet seat             Problem: Pressure Injury - Risk of  Goal: *Prevention of pressure injury  Document Idmitris Scale and appropriate interventions in the flowsheet. Outcome: Progressing Towards Goal  Pressure Injury Interventions:  Sensory Interventions: Assess need for specialty bed, Assess changes in LOC, Check visual cues for pain, Float heels, Monitor skin under medical devices, Turn and reposition approx.  every two hours (pillows and wedges if needed)    Moisture Interventions: Assess need for specialty bed, Check for incontinence Q2 hours and as needed, Moisture barrier    Activity Interventions: Pressure redistribution bed/mattress(bed type), Increase time out of bed    Mobility Interventions: HOB 30 degrees or less, Pressure redistribution bed/mattress (bed type), Float heels    Nutrition Interventions: Document food/fluid/supplement intake    Friction and Shear Interventions: HOB 30 degrees or less

## 2018-05-01 NOTE — PROGRESS NOTES
Problem: Self Care Deficits Care Plan (Adult)  Goal: *Acute Goals and Plan of Care (Insert Text)  Occupational Therapy Goals  Initiated 5/1/2018  1. Patient will perform grooming with supervision/setup standing at the sink >5 minutes within 7 day(s). 2.  Patient will perform upper body dressing and bathing with modified independence within 7 day(s). 3.  Patient will perform lower body dressing and bathing with minimum assistance within 7 day(s). 4.  Patient will perform toilet transfers with supervision/set-up using rolling walker within 7 day(s). 5.  Patient will perform all aspects of toileting with minimal assistance within 7 day(s). 6.  Patient will participate in upper extremity therapeutic exercise/activities with modified independence for 10 minutes within 7 day(s). 7.  Patient will utilize energy conservation techniques during functional activities with verbal cues within 7 day(s). Occupational Therapy EVALUATION  Patient: Shade Juares (73 y.o. female)  Date: 5/1/2018  Primary Diagnosis: Abdominal pain  Appendicitis  Procedure(s) (LRB):  APPENDECTOMY LAPAROSCOPIC  (N/A) 1 Day Post-Op   Precautions: fall       ASSESSMENT :  Based on the objective data described below, the patient presents with decreased activity tolerance on POD 1 of lap appendectomy for perforated appendicitis. Patient was received after transferring to 4th floor from ICU. Patient is a poor historian, oriented to self only, and unable to provide background information d/t baseline cognitive impairments. Per chart, she lives with her . CM is working to contact  to determine if he is able to provide assistance at home. Patient continuously asks why her stomach hurts and needs frequent redirection to situation, specifically that she is at the hospital post surgery.   Patient educated on precautions to avoid increased abdominal pain, including log roll technique for bed mobility and limit pushing, pulling, and picking up. Patient required min A for both bed mobility and OOB transfer to chair. Patient fatigues easily and limited in additional transfers d/t pain and weakness. Patient currently requires setup to min A for UB ADLs and required max to total A for LB ADLs. Patient educated on use of call bell and performed teach back x3 however chair alarm activated d/t patient's safety risk and poor short term memory. Patient would benefit from continued skilled OT to progress towards goals and improve overall independence. Patient will benefit from skilled intervention to address the above impairments. Patients rehabilitation potential is considered to be Good  Factors which may influence rehabilitation potential include:   [x]             None noted  []             Mental ability/status  []             Medical condition  []             Home/family situation and support systems  []             Safety awareness  []             Pain tolerance/management  []             Other:      PLAN :  Recommendations and Planned Interventions:  [x]               Self Care Training                  [x]        Therapeutic Activities  [x]               Functional Mobility Training    []        Cognitive Retraining  [x]               Therapeutic Exercises           [x]        Endurance Activities  []               Balance Training                   []        Neuromuscular Re-Education  []               Visual/Perceptual Training     [x]   Home Safety Training  [x]               Patient Education                 [x]        Family Training/Education  []               Other (comment):    Frequency/Duration: Patient will be followed by occupational therapy 5 times a week to address goals. Discharge Recommendations: [address] [facility] with 24/7 supervision + hands on assist with OOB transfers/activity;   This will pend how much assistance  can provide at discharge  Further Equipment Recommendations for Discharge: none at this time     SUBJECTIVE:   Patient stated I haven't needed much until recently.  re: assistance at home with self-care    OBJECTIVE DATA SUMMARY:   HISTORY:   Past Medical History:   Diagnosis Date    Alzheimer disease     Anxiety 4/23/2010    GERD (gastroesophageal reflux disease) 4/23/2010    Mild dementia     UTI (lower urinary tract infection) 4/23/2010     Past Surgical History:   Procedure Laterality Date    ENDOSCOPY, COLON, DIAGNOSTIC      HX CATARACT REMOVAL  5/2011    left    HX UROLOGICAL      bladder uplift       Prior Level of Function/Environment/Context: Patient lives with her . She is a poor historian. Patient does state that she was independent with self-care PTA. CM is reaching out to  to clarify background information. Home Situation  Home Environment: Private residence  One/Two Story Residence: One story  Living Alone: No  Support Systems: Spouse/Significant Other/Partner  Patient Expects to be Discharged to[de-identified] Private residence  Current DME Used/Available at Home: None  [x]  Right hand dominant   []  Left hand dominant    EXAMINATION OF PERFORMANCE DEFICITS:  Cognitive/Behavioral Status:  Neurologic State: Alert  Orientation Level: Oriented to person;Disoriented to time;Disoriented to situation;Disoriented to place  Cognition: Follows commands;Memory loss  Perception: Appears intact  Perseveration: No perseveration noted  Safety/Judgement: Decreased awareness of environment    Skin: Intact in the uppers    Edema: None noted in the uppers    Hearing:   Auditory  Auditory Impairment: Hearing aid(s), Hard of hearing, left side, Hard of hearing, right side  Hearing Aids/Status: Bilateral, With patient    Vision/Perceptual:    Tracking: Able to track stimulus in all quadrants w/o difficulty    Diplopia: No    Acuity: Within Defined Limits       Range of Motion:  WDL in the uppers    Strength:  Decreased but functional in the uppers    Coordination:  Fine Motor Skills-Upper: Left Intact; Right Intact    Gross Motor Skills-Upper: Left Intact; Right Intact    Tone & Sensation:  Tone: normal  Sensation: intact    Balance:  Sitting: Impaired  Sitting - Static: Good (unsupported)  Sitting - Dynamic: Fair (occasional)  Standing: Impaired  Standing - Static: Poor  Standing - Dynamic : Poor    Functional Mobility and Transfers for ADLs:  Bed Mobility:  Rolling: Minimum assistance; Additional time;Assist x1 (max education for log roll technique)  Supine to Sit: Minimum assistance; Additional time;Assist x1  Sit to Supine: Minimum assistance; Additional time;Assist x1  Scooting: Minimum assistance;Assist x1;Additional time    Transfers:  Sit to Stand: Minimum assistance;Assist x1;Additional time  Stand to Sit: Minimum assistance;Assist x1;Additional time  Bed to Chair: Minimum assistance;Assist x1;Additional time  Toilet Transfer : Minimum assistance; Additional time;Assist x1    ADL Assessment:  Feeding: Setup    Oral Facial Hygiene/Grooming: Minimum assistance    Bathing: Maximum assistance    Upper Body Dressing: Minimum assistance    Lower Body Dressing: Total assistance    Toileting: Maximum assistance    Cognitive Retraining  Safety/Judgement: Decreased awareness of environment    Functional Measure:  Barthel Index:    Bathin  Bladder: 5  Bowels: 10  Groomin  Dressin  Feedin  Mobility: 0  Stairs: 0  Toilet Use: 5  Transfer (Bed to Chair and Back): 10  Total: 35       Barthel and G-code impairment scale:  Percentage of impairment CH  0% CI  1-19% CJ  20-39% CK  40-59% CL  60-79% CM  80-99% CN  100%   Barthel Score 0-100 100 99-80 79-60 59-40 20-39 1-19   0   Barthel Score 0-20 20 17-19 13-16 9-12 5-8 1-4 0      The Barthel ADL Index: Guidelines  1. The index should be used as a record of what a patient does, not as a record of what a patient could do.   2. The main aim is to establish degree of independence from any help, physical or verbal, however minor and for whatever reason. 3. The need for supervision renders the patient not independent. 4. A patient's performance should be established using the best available evidence. Asking the patient, friends/relatives and nurses are the usual sources, but direct observation and common sense are also important. However direct testing is not needed. 5. Usually the patient's performance over the preceding 24-48 hours is important, but occasionally longer periods will be relevant. 6. Middle categories imply that the patient supplies over 50 per cent of the effort. 7. Use of aids to be independent is allowed. Vicki Louise., Barthel, [place] ([id number]). Functional evaluation: the Barthel Index. [address] [facility] (14)2. Norma Press reid GUSTAVO Gongora, Toño Joya., Kwame Abernathy., [place], [address] (1999). Measuring the change indisability after inpatient rehabilitation; comparison of the responsiveness of the Barthel Index and Functional [place] Measure. Journal of Neurology, Neurosurgery, and Psychiatry, 66(4), 818-146. ESTIVEN Paez, KEKE Garvin, & Werner Nixon MLazaroA. (2004.) Assessment of post-stroke quality of life in cost-effectiveness studies: The usefulness of the Barthel Index and the EuroQoL-5D. Quality of Life Research, 13, 961-16       G codes: In compliance with CMSs Claims Based Outcome Reporting, the following G-code set was chosen for this patient based on their primary functional limitation being treated: The outcome measure chosen to determine the severity of the functional limitation was the Barthel Index with a score of 35/100 which was correlated with the impairment scale. ?  Self Care:     - CURRENT STATUS: CL - 60%-79% impaired, limited or restricted    - GOAL STATUS: CK - 40%-59% impaired, limited or restricted    - D/C STATUS:  ---------------To be determined---------------     Occupational Therapy Evaluation Charge Determination   History Examination Decision-Making   LOW Complexity : Brief history review  LOW Complexity : 1-3 performance deficits relating to physical, cognitive , or psychosocial skils that result in activity limitations and / or participation restrictions  LOW Complexity : No comorbidities that affect functional and no verbal or physical assistance needed to complete eval tasks       Based on the above components, the patient evaluation is determined to be of the following complexity level: LOW   Activity Tolerance:   Patient with fair activity tolerance. Please refer to the flowsheet for vital signs taken during this treatment. After treatment:   [x] Patient left in no apparent distress sitting up in chair  [] Patient left in no apparent distress in bed  [x] Call bell left within reach  [x] Nursing notified  [] Caregiver present  [x] Chair alarm activated    COMMUNICATION/EDUCATION:   The patients plan of care was discussed with: Physical Therapist, Registered Nurse and patient. .  [x] Home safety education was provided and the patient/caregiver indicated understanding. [x] Patient/family have participated as able in goal setting and plan of care. [x] Patient/family agree to work toward stated goals and plan of care. [] Patient understands intent and goals of therapy, but is neutral about his/her participation. [] Patient is unable to participate in goal setting and plan of care. This patients plan of care is appropriate for delegation to hospitals.     Thank you for this referral.  Rodriguez Herr OTR/L  Time Calculation: 38 mins

## 2018-05-01 NOTE — PROGRESS NOTES
Problem: Mobility Impaired (Adult and Pediatric)  Goal: *Acute Goals and Plan of Care (Insert Text)  Physical Therapy Goals  Initiated 5/1/2018  1. Patient will move from supine to sit and sit to supine , scoot up and down and roll side to side in bed with independence within 7 day(s). 2.  Patient will transfer from bed to chair and chair to bed with independence using the least restrictive device within 7 day(s). 3.  Patient will perform sit to stand with modified independence within 7 day(s). 4.  Patient will ambulate with modified independence for 200 feet with the least restrictive device within 7 day(s). physical Therapy EVALUATION  Patient: Roxie Griffin (69 y.o. female)  Date: 5/1/2018  Primary Diagnosis: Abdominal pain  Appendicitis  Procedure(s) (LRB):  APPENDECTOMY LAPAROSCOPIC  (N/A) 1 Day Post-Op   Precautions: fall       ASSESSMENT :  Based on the objective data described below, the patient presents with generalized weakness and debility. Sit on the edge of bed with min assist, sit to stand min assist, ambulate towards the chair with rolling walker min assist. Sitting balance good, standing balance fair. Pleasantly confuse has short term memory loss about her surgery keep on asking what happen. OOB to chair as tolerated performed some active range range of motion exercise on both LE all planes. Activated chair alarm and notified nurse who agreed to monitor and assist back to bed when ask. Patient will benefit from skilled intervention to address the above impairments.   Patients rehabilitation potential is considered to be Excellent  Factors which may influence rehabilitation potential include:   []         None noted  [x]         Mental ability/status  [x]         Medical condition  []         Home/family situation and support systems  [x]         Safety awareness  [x]         Pain tolerance/management  []         Other:      PLAN :  Recommendations and Planned Interventions:  [x] Bed Mobility Training             []    Neuromuscular Re-Education  [x]           Transfer Training                   []    Orthotic/Prosthetic Training  [x]           Gait Training                         []    Modalities  [x]           Therapeutic Exercises           []    Edema Management/Control  [x]           Therapeutic Activities            []    Patient and Family Training/Education  []           Other (comment):    Frequency/Duration: Patient will be followed by physical therapy  5 times a week to address goals. Discharge Recommendations: Rehab vs Home Health and To Be Determined pending progress from therapy. Further Equipment Recommendations for Discharge: TBD     SUBJECTIVE:   Patient stated Ok. How did I end up here? [facility]    OBJECTIVE DATA SUMMARY:   HISTORY:    Past Medical History:   Diagnosis Date    Alzheimer disease     Anxiety 4/23/2010    GERD (gastroesophageal reflux disease) 4/23/2010    Mild dementia     UTI (lower urinary tract infection) 4/23/2010     Past Surgical History:   Procedure Laterality Date    ENDOSCOPY, COLON, DIAGNOSTIC      HX CATARACT REMOVAL  5/2011    left    HX UROLOGICAL      bladder uplift     Prior Level of Function/Home Situation: patient poor historian per chart review patient lives in a single level house with spouse. Personal factors and/or comorbidities impacting plan of care:     Home Situation  Home Environment: Private residence  One/Two Story Residence: One story  Living Alone: No  Support Systems: Spouse/Significant Other/Partner  Patient Expects to be Discharged to[de-identified] Private residence  Current DME Used/Available at Home: None    EXAMINATION/PRESENTATION/DECISION MAKING:   Critical Behavior:  Neurologic State: Alert  Orientation Level: Oriented to person, Disoriented to time, Disoriented to situation, Disoriented to place  Cognition: Follows commands, Memory loss  Safety/Judgement: Decreased awareness of environment  Hearing:   Auditory  Auditory Impairment: Hearing aid(s), Hard of hearing, left side, Hard of hearing, right side  Hearing Aids/Status: Bilateral, With patient    Range Of Motion:  AROM: Within functional limits           PROM: Within functional limits           Strength:    Strength: Generally decreased, functional                    Tone & Sensation:                                  Coordination:  Coordination: Within functional limits  Vision:   Tracking: Able to track stimulus in all quadrants w/o difficulty  Diplopia: No  Acuity: Within Defined Limits  Functional Mobility:  Bed Mobility:  Rolling: Minimum assistance; Additional time;Assist x1 (max education for log roll technique)  Supine to Sit: Minimum assistance; Additional time;Assist x1  Sit to Supine: Minimum assistance; Additional time;Assist x1  Scooting: Minimum assistance;Assist x1;Additional time  Transfers:  Sit to Stand: Minimum assistance;Assist x1;Additional time  Stand to Sit: Minimum assistance;Assist x1;Additional time  Stand Pivot Transfers: Minimum assistance     Bed to Chair: Minimum assistance;Assist x1;Additional time              Balance:   Sitting: Impaired  Sitting - Static: Good (unsupported)  Sitting - Dynamic: Fair (occasional)  Standing: Impaired  Standing - Static: Poor  Standing - Dynamic : Poor  Ambulation/Gait Training:  Distance (ft): 5 Feet (ft)  Assistive Device: Walker, rolling;Gait belt  Ambulation - Level of Assistance: Minimal assistance; Additional time     Gait Description (WDL): Exceptions to WDL  Gait Abnormalities: Antalgic; Path deviations; Step to gait        Base of Support: Widened     Speed/Kassandra: Slow                     Functional Measure:  Barthel Index:    Bathin  Bladder: 5  Bowels: 10  Groomin  Dressin  Feedin  Mobility: 0  Stairs: 0  Toilet Use: 5  Transfer (Bed to Chair and Back): 10  Total: 35       Barthel and G-code impairment scale:  Percentage of impairment CH  0% CI  1-19% CJ  20-39% CK  40-59% CL  60-79% CM  80-99% CN  100%   Barthel Score 0-100 100 99-80 79-60 59-40 20-39 1-19   0   Barthel Score 0-20 20 17-19 13-16 9-12 5-8 1-4 0      The Barthel ADL Index: Guidelines  1. The index should be used as a record of what a patient does, not as a record of what a patient could do. 2. The main aim is to establish degree of independence from any help, physical or verbal, however minor and for whatever reason. 3. The need for supervision renders the patient not independent. 4. A patient's performance should be established using the best available evidence. Asking the patient, friends/relatives and nurses are the usual sources, but direct observation and common sense are also important. However direct testing is not needed. 5. Usually the patient's performance over the preceding 24-48 hours is important, but occasionally longer periods will be relevant. 6. Middle categories imply that the patient supplies over 50 per cent of the effort. 7. Use of aids to be independent is allowed. Eunice Wilson., Barthel, D.W. ([id number]). Functional evaluation: the Barthel Index. [address] [facility] (14)2. Mariola Rincon reid GUSTAVO Gongora, Tianna Vargas., Shannan Fournier., [place], [address] [place] [address] (1999). Measuring the change indisability after inpatient rehabilitation; comparison of the responsiveness of the Barthel Index and Functional [place] Measure. Journal of Neurology, Neurosurgery, and Psychiatry, 66(4), 573-865. Ashley Ruano, [place].A, BLANK GarvinJ.ENE, & Benita Mendoza M.A. (2004.) Assessment of post-stroke quality of life in cost-effectiveness studies: The usefulness of the Barthel Index and the EuroQoL-5D. Quality of Life Research, 13, 385-37       G codes: In compliance with CMSs Claims Based Outcome Reporting, the following G-code set was chosen for this patient based on their primary functional limitation being treated:     The outcome measure chosen to determine the severity of the functional limitation was the barthel with a score of 35/100 which was correlated with the impairment scale. ? Mobility - Walking and Moving Around:     - CURRENT STATUS: CJ - 20%-39% impaired, limited or restricted    - GOAL STATUS: CI - 1%-19% impaired, limited or restricted    - D/C STATUS:  ---------------To be determined---------------      Physical Therapy Evaluation Charge Determination   History Examination Presentation Decision-Making   MEDIUM  Complexity : 1-2 comorbidities / personal factors will impact the outcome/ POC  MEDIUM Complexity : 3 Standardized tests and measures addressing body structure, function, activity limitation and / or participation in recreation  MEDIUM Complexity : Evolving with changing characteristics  Other outcome measures barthel  MEDIUM      Based on the above components, the patient evaluation is determined to be of the following complexity level: MEDIUM    Pain:  Pain Scale 1: Visual  Pain Intensity 1: 0  Pain Location 1: Abdomen  Pain Orientation 1: Right; Lower  Pain Description 1: Sore  Pain Intervention(s) 1: Medication (see MAR)  Activity Tolerance:   Good. Please refer to the flowsheet for vital signs taken during this treatment. After treatment:   [x]         Patient left in no apparent distress sitting up in chair  []         Patient left in no apparent distress in bed  [x]         Call bell left within reach  [x]         Nursing notified  []         Caregiver present  [x]         Chair alarm activated    COMMUNICATION/EDUCATION:   The patients plan of care was discussed with: Occupational Therapist, Registered Nurse and patient. [x]         Fall prevention education was provided and the patient/caregiver indicated understanding. [x]         Patient/family have participated as able in goal setting and plan of care. [x]         Patient/family agree to work toward stated goals and plan of care.   []         Patient understands intent and goals of therapy, but is neutral about his/her participation. []         Patient is unable to participate in goal setting and plan of care. Thank you for this referral.  Ricky Watson, PT,WCC.    Time Calculation: 26 mins

## 2018-05-01 NOTE — PROGRESS NOTES
[address] [facility] [address] [place] [address] [place] [address]   Office [phone], Fax [phone]  Family Medicine Daily Progress Note: 5/1/2018      Assessment/Plan:   Gil Brown is a 80 y.o. female with hx dementia, A-fib who is admitted for Abdominal pain and SIRS. 24hr Events: Patient underwent laparoscopic appendectomy     Sepsis 2/2 Performated Appendicitis s/p Laparoscopic repair on 4/30/18: Per Surgery, patient had peritonitis with purulent exudate covering peritoneum of RLQ, ileum, sigmoid colon.   -Patient currently stable, will advance diet to clear liquids and transfer to remote telemetry.   -routine post-op care  -MARCUS drain in place, continue to monitor output  -Dilaudid 0.5mg Q 6hrs PRN for pain  -Surgery following, appreciate recommendations  -Continue Cefepime and flagyl   -F/u blood and urine cx    Hyponatremia resolved: mild, POA : 135 --> 136  - IVF  - daily CMP     GERD: stable, not on any home meds     Right middle lobe nodule: 5mm in size  - outpatient follow up     Dementia: Mild, stable not on any home meds  -Palliative consulted. Patient's family aware and will be in hospital this afternoon to discuss ACP. FEN/Diet: clear liquids  VTE prophylaxis: SCDs  Consults: Surgery  Code Status: Full  Discussed plan of care with Patient/Family   Disposition:  TBD     Subjective:   Patient reports she is not in pain, however does wince when abdomen is touched.       Review of Systems:   Review of Systems: unable to obtain due to patient's mental status    Objective:     Physical examination  Patient Vitals for the past 12 hrs:   BP Temp Pulse Resp SpO2 Weight   05/01/18 0800 119/51 - 90 25 96 % -   05/01/18 0701 - - 95 - - -   05/01/18 0700 128/69 97.2 °F (36.2 °C) 100 26 96 % -   05/01/18 0500 126/60 - 96 19 96 % -   05/01/18 0400 130/62 98.7 °F (37.1 °C) 91 21 96 % -   05/01/18 0300 136/59 - 89 25 96 % -   05/01/18 0200 142/65 - 93 26 96 % -   05/01/18 0100 138/59 - 87 23 98 % -   05/01/18 0032 - - - - - 130 lb 8.2 oz (59.2 kg)   18 0000 141/61 98.7 °F (37.1 °C) 93 20 97 % -   18 2301 - - 94 - - -   18 2300 133/63 - 94 20 95 % -   18 2205 146/59 98.6 °F (37 °C) (!) 102 24 98 % -   180 141/59 - 96 22 97 % -   18 143/60 - 97 25 99 % -   180 145/62 - (!) 101 27 98 % -   185 144/64 - 100 24 98 % -   180 150/74 97.9 °F (36.6 °C) (!) 102 28 97 % -   18 153/66 - (!) 103 23 98 % -   180 156/74 - 100 21 97 % -   18 152/80 - 98 18 97 % -   18 152/67 - 99 19 95 % -   18 141/69 98.5 °F (36.9 °C) 93 18 96 % -   18 144/72 - 94 17 95 % -     Temp (24hrs), Av.2 °F (36.8 °C), Min:97.2 °F (36.2 °C), Max:98.7 °F (37.1 °C)      Intake/Output Summary (Last 24 hours) at 18 8975  Last data filed at 18 1352   Gross per 24 hour   Intake          5086.68 ml   Output             1830 ml   Net          3256.68 ml      O2 Flow Rate (L/min): 2 l/min   O2 Device: Nasal cannula     General:   Alert, cooperative, no acute distress   Head:   Atraumatic   Eyes:   Conjunctivae clear   ENT:  Oral mucosa normal   Neck:  Supple, trachea midline, no adenopathy   No JVD   Chest wall:    No tenderness or deformities    Lungs:   Clear to auscultation bilaterally    Heart:   Regular rhythm, no murmur   Abdomen:    Soft, non-tender   No masses or organomegaly    Extremities:  No edema or DVT signs   Pulses:  Symmetric all extremities   Skin:  Warm and dry    No rashes or lesions   Neurologic:  Oriented   No focal deficits   Psychiatric:  At baseline level of dementia          Data Review:       Recent Labs      18   0402  18   2350  18   1829   18   0006  18   1718   WBC  10.5   --    --    --   14.6*  19.2*   HGB  11.4*  12.0  12.0   < >  12.8  14.4   HCT  34.8*  37.6  36.8   < >  39.2  43.3   PLT  203   --    --    --   232  281    < > = values in this interval not displayed.      Recent Labs      05/01/18   0402  04/30/18   0006  04/29/18   1718   NA  136  135*  135*   K  3.6  3.6  3.7   CL  106  102  99   CO2  21  25  25   GLU  152*  190*  187*   BUN  10  7  7   CREA  0.70  0.88  0.93   CA  8.0*  8.3*  9.5   MG   --   1.6   --    PHOS   --    --   3.1   ALB  2.1*  2.9*  3.6   SGOT  14*  14*  19   ALT  14  21  24     Medications reviewed  Current Facility-Administered Medications   Medication Dose Route Frequency    pantoprazole (PROTONIX) 40 mg in sodium chloride 0.9% 10 mL injection  40 mg IntraVENous Q12H    glucose chewable tablet 16 g  4 Tab Oral PRN    dextrose (D50W) injection syrg 12.5-25 g  12.5-25 g IntraVENous PRN    glucagon (GLUCAGEN) injection 1 mg  1 mg IntraMUSCular PRN    insulin lispro (HUMALOG) injection   SubCUTAneous Q6H    sodium chloride (NS) flush 5-10 mL  5-10 mL IntraVENous PRN    cefepime (MAXIPIME) 2 g in 0.9% sodium chloride (MBP/ADV) 100 mL  2 g IntraVENous Q8H    sodium chloride (NS) flush 5-10 mL  5-10 mL IntraVENous Q8H    sodium chloride (NS) flush 5-10 mL  5-10 mL IntraVENous PRN    0.9% sodium chloride infusion  100 mL/hr IntraVENous CONTINUOUS    naloxone (NARCAN) injection 0.4 mg  0.4 mg IntraVENous PRN    ondansetron (ZOFRAN) injection 4 mg  4 mg IntraVENous Q6H PRN    metroNIDAZOLE (FLAGYL) IVPB premix 500 mg  500 mg IntraVENous Q12H    HYDROmorphone (DILAUDID) injection 0.5 mg  0.5 mg IntraVENous Q6H PRN       Signed:   Jackie Lacy MD   Resident, Family Medicine    Patient discussed with Dr. Reji Stock , [facility] Medicine Attending

## 2018-05-01 NOTE — PROGRESS NOTES
TRANSFER - IN REPORT:    Verbal report received from Geovanni Adam RN(name) on Ruddy Branch  being received from PACU (unit) for routine progression of care      Report consisted of patients Situation, Background, Assessment and   Recommendations(SBAR). Information from the following report(s) SBAR, Kardex, Recent Results, Med Rec Status, Cardiac Rhythm ST and Alarm Parameters  was reviewed with the receiving nurse. Opportunity for questions and clarification was provided. Assessment completed upon patients arrival to unit and care assumed. MARCUS drain in the left lower abdomen. Ryder catheter intact. Lap sites intact.      Primary Nurse Richard Daigle RN and Geovanni Adam RN performed a dual skin assessment on this patient Impairment noted- see wound doc flow sheet  Dimitris score is 17

## 2018-05-01 NOTE — OP NOTES
Date: 04/30/18     Pre-operative Diagnosis: Perforated appendicitis with peritonitis     Post-operative Diagnosis: Perforated appendicitis      Procedure: Laparoscopic Appendectomy      Surgeon: Beverly Boucher MD     Surgical Assistant: Conrad Reddy     Anesthesia: General     Estimated Blood Loss: 50 cc    Drains: Intra-abdominal 19 Fr Heladio drain     Findings:Purulent exudate covering the peritoneum of the right lower quadrant, ileum and sigmoid colon. The sigmoid colon was thickened and contained numerous diverticula without perforation or fecal peritoneal contamination. The tip of the appendix was adherent to the sigmoid colon and appeared to have perforated, the remainder of the appendix appeared normal.     Specimen: Appendix     Indication: 80year-old female with acute onset right lower abdominal pain and localized peritonitis. Work-up demonstrated  acute appendicitis with perforated tip.     Procedure: After consent was obtained, the patient was taken back to the operating room and placed in a supine position. After induction of general anesthesia and endotracheal intubation, the abdomen was prepped and draped in normal sterile fashion and left arm tucked. The patient is being treated for appendicitis, and appropriate antibiotic therapy was verified in the EMR as given with the team during pre-incision time out. Pre-incision subcutaneous local analgesia was instilled in the pre-planned port site tissues and the first incision was made a minute later. Access to the abdomen was obtained via an direct cutdown onto the fascia through an infraumbilical incision. A 5-12 mm blunt tip balloon port was placed and pneumoperitoneum was established a 15mmHg. Inspection of the underlying bowel showed no injury. Next a suprapubic 5mm port and left lower quadrant 5 mm port was placed under direct vision. There was purulent exudate covering the peritoneum of the right lower quadrant, ileum and sigmoid colon.  The sigmoid colon was thickened and contained numerous diverticula without perforation or fecal peritoneal contamination. The tip of the appendix was adherent to the sigmoid colon and appeared to have perforated, the remainder of the appendix appeared normal. The perforated appendix was identified in the right lower quadrant in plain view at the confluence of the taenia. The junction of the terminal ileum to the cecum and the ascending colon was also identified for further confirmation. There was no peritoneal fluid in the right lower quadrant. The meso-appendix was grasped and lifted upwards to clearly identify the base and the junction with the cecum. The meso appendix was divided using one 45mm tan load stapler. The base of the appendix was then transected using a 60 mm purple load stapler. The appendix was then retrieved through the infra-umbilical port using an endo catch retrieval bag. Reexamination of the right lower quadrant revealed no bleeding. An additional 5 mm right lower abdominal port was placed. The sigmoid colon was inspected, no full thickness perforation was appreciated. Hemostasis was satisfactory. Instrument, sponge and needle counts were correct. The ports were removed under direct vision confirming no rectus bleeding, peritoneal bleeding or injury to intraabdominal structures. A 19 Fr Heladio drain was positioned along the right lower quadrant, pelvis and left lower quadrant. It was brought out through the left lower abdominal  5 mm port site ans secured to the skin with a 2-0 Nylon. The pneumoperitoneum was maximally evacuated. The 92OD infra-umbilical port site fascial defect was closed with an 0-PDS figure of eight stitch.  All skin was closed with subcuticular 4-0 Monocryl, Dermabond was applied.     The patient tolerated procedure well and returned to the post-anesthesia recovery room in stable condition.     Nisha Tidwell MD

## 2018-05-01 NOTE — ANESTHESIA POSTPROCEDURE EVALUATION
Post-Anesthesia Evaluation and Assessment    Patient: Gayle Angel MRN: [id number]  SSN: [id number]    YOB: 1934  Age: 80 y.o. Sex: female       Cardiovascular Function/Vital Signs  Visit Vitals    /59    Pulse (!) 102    Temp 36.6 °C (97.9 °F)    Resp 24    Ht 5' 3\" (1.6 m)    Wt 59 kg (130 lb)    SpO2 98%    BMI 23.03 kg/m2       Patient is status post general anesthesia for Procedure(s):  APPENDECTOMY LAPAROSCOPIC . Nausea/Vomiting: None    Postoperative hydration reviewed and adequate. Pain:  Pain Scale 1: Numeric (0 - 10) (04/30/18 2140)  Pain Intensity 1: 5 (04/30/18 2140)   Managed    Neurological Status:   Neuro (WDL): Exceptions to WDL (04/30/18 2140)  Neuro  Neurologic State: Drowsy; Confused (04/30/18 2140)  Orientation Level: Oriented to person (04/30/18 2140)  Cognition: Decreased attention/concentration;Decreased command following (04/30/18 2140)  Speech: Clear (04/30/18 2140)  Assessment L Pupil: Sluggish (04/30/18 2140)  Size L Pupil (mm): 2 (04/30/18 2140)  Assessment R Pupil: Sluggish (04/30/18 2140)  Size R Pupil (mm): 2 (04/30/18 2140)  LUE Motor Response: Weak (04/30/18 2140)  LLE Motor Response: Weak (04/30/18 2140)  RUE Motor Response: Weak (04/30/18 2140)  RLE Motor Response: Weak (04/30/18 2140)   At baseline    Mental Status and Level of Consciousness: Arousable    Pulmonary Status:   O2 Device: Nasal cannula (04/30/18 2140)   Adequate oxygenation and airway patent    Complications related to anesthesia: None    Post-anesthesia assessment completed.  No concerns    Signed By: Frankie Cline DO     April 30, 2018

## 2018-05-01 NOTE — PROGRESS NOTES
5/1/2018 4:55 PM Called and spoke with pt's  regarding discharge, recommendation for SNF. Pt's  was agreeable and selected [facility] preference and [facility] as back up. Referrals sent via [employer]. 5/1/2018 12:34 PM Introduced self to pt's daughter and  to explain potential discharge dispo, hh vs snf. Pt's family was understanding CM will follow up after PT and OT evals.  MARTITA Rivera

## 2018-05-01 NOTE — PROGRESS NOTES
Bedside and Verbal shift change report given to Lissette brooks RN (oncoming nurse) by Major Nguyen RN (offgoing nurse). Report included the following information SBAR, Kardex, ED Summary, OR Summary, Procedure Summary, Intake/Output, MAR and Recent Results.

## 2018-05-01 NOTE — MED STUDENT NOTES
*ATTENTION:  This note has been created by a medical student for educational purposes only. Please do not refer to the content of this note for clinical decision-making, billing, or other purposes. Please see attending physicians note to obtain clinical information on this patient. *         [facility]   Daily Progress Note    Date: 5/1/2018    Assessment/Plan:   Brayan Stockton is a 80 y.o. female who is POD 1 from laparoscopic apendectomy for contained appendix perforation. She was admitted for acute abdominal pain and 4/4 SIRS concerning for sepsis with a past medical history of dementia, UTI, and GERD. Ruptured Appendix with Sepsis - POD 1 lap appendectomy, EBL 50 cc, purulent exudate was found on peritoneum, sigmoid was thickened with multiple diverticuli and no signs of perforation. Sepsis most likely 2/2 perforated appendix, 4/4 SIRS on admission.  - Continue IVF  - NPO, strict I/O, Zofran prn  - Continue dilaudid for pain control  - Follow up with GI recs  - Surgery is following  - Continue Cefepime, levaquin, metronidazole (Day 2)  - Continue incentive spirometry, wound care and routine post-op care  - PT/OT eval and treat     Hyponatremia - mild on admission, RESOLVED   - continue IVF   - Continue to montior BMP daily    Urinary retention -  no medications at home  - Ryder catheter in place    Dementia - not on any home medications  - Continue to monitor mental status  - Frequent reorientation, lights on, limit sedating medications  - Palliative care consulted, appreciate the recs  -Try to attend family meeting to discuss goals of care    GERD - not on any medications at home, well-controlled  - Continue to monitor  - GI ppx with Protonix      FEN/GI - NPO. NS at 100 mL/hr. Activity - with assisstance  DVT prophylaxis - SCDs  GI prophylaxis -  Protonix  Disposition - Plan to d/c to home.  Needs to be evaluated by surgery and GI.    CODE STATUS:  FULL CODE    Patient discussed with Dr. Suzanna Velasco, [address] Medicine          CC: \"I feel confused this morning\"    Subjective  Patient is POD 1 from laparoscopic appendectomy and there were no acute events overnight. This morning she continues to endorse abdominal pain and mild shortness of breath. She feels confused this morning and inquires about whether her family knows she is in the hospital. Per chart review patient has a history of short term memory loss confirmed by her family. Denies chills, headaches, chest pain, palpitations, and LE edema.        Inpatient Medications  Current Facility-Administered Medications   Medication Dose Route Frequency    pantoprazole (PROTONIX) 40 mg in sodium chloride 0.9% 10 mL injection  40 mg IntraVENous Q12H    glucose chewable tablet 16 g  4 Tab Oral PRN    dextrose (D50W) injection syrg 12.5-25 g  12.5-25 g IntraVENous PRN    glucagon (GLUCAGEN) injection 1 mg  1 mg IntraMUSCular PRN    insulin lispro (HUMALOG) injection   SubCUTAneous Q6H    sodium chloride (NS) flush 5-10 mL  5-10 mL IntraVENous PRN    cefepime (MAXIPIME) 2 g in 0.9% sodium chloride (MBP/ADV) 100 mL  2 g IntraVENous Q8H    sodium chloride (NS) flush 5-10 mL  5-10 mL IntraVENous Q8H    sodium chloride (NS) flush 5-10 mL  5-10 mL IntraVENous PRN    0.9% sodium chloride infusion  100 mL/hr IntraVENous CONTINUOUS    naloxone (NARCAN) injection 0.4 mg  0.4 mg IntraVENous PRN    ondansetron (ZOFRAN) injection 4 mg  4 mg IntraVENous Q6H PRN    metroNIDAZOLE (FLAGYL) IVPB premix 500 mg  500 mg IntraVENous Q12H    HYDROmorphone (DILAUDID) injection 0.5 mg  0.5 mg IntraVENous Q6H PRN         Allergies  Allergies   Allergen Reactions    Macrodantin [Nitrofurantoin Macrocrystalline] Rash    Pcn [Penicillins] Rash    Sulfa (Sulfonamide Antibiotics) Rash         Objective  Vitals:  Patient Vitals for the past 8 hrs:   Temp Pulse Resp BP SpO2   05/01/18 0700 - 100 26 128/69 96 %   05/01/18 0500 - 96 19 126/60 96 %   05/01/18 0400 98.7 °F (37.1 °C) 91 21 130/62 96 %   05/01/18 0300 - 89 25 136/59 96 %   05/01/18 0200 - 93 26 142/65 96 %   05/01/18 0100 - 87 23 138/59 98 %   05/01/18 0000 98.7 °F (37.1 °C) 93 20 141/61 97 %         I/O:    Intake/Output Summary (Last 24 hours) at 05/01/18 0723  Last data filed at 05/01/18 0700   Gross per 24 hour   Intake          5050.01 ml   Output             1830 ml   Net          3220.01 ml     Last shift:       Last 3 shifts:    04/29 1901 - 05/01 0700  In: 2795 [I.V.:6230]  Out: 2630 [Urine:2420; Drains:210]    Physical Exam:  General: Appears uncomfortable and confused in bed. Alert. Oriented to self and location. Not oriented to date and recent events. Cooperative. Head: Normocephalic. Atraumatic. Respiratory: Clear to auscultation bilaterally. No crackles, wheezes, or rhonchi. Cardiovascular: RRR. Normal S1,S2. No m/r/g. Pulses 2+ throughout. GI: decreased bowel sounds throughout. Diffusely tender to palpation. Soft. No guarding. Mild distention. Extremities: No edema. No tenderness.      Laboratory Data  Recent Results (from the past 12 hour(s))   GLUCOSE, POC    Collection Time: 04/30/18  9:29 PM   Result Value Ref Range    Glucose (POC) 106 (H) 65 - 100 mg/dL    Performed by Chance Tomlinson    GLUCOSE, POC    Collection Time: 04/30/18 11:23 PM   Result Value Ref Range    Glucose (POC) 135 (H) 65 - 100 mg/dL    Performed by RCISTY HERNANDEZ    LACTIC ACID    Collection Time: 04/30/18 11:50 PM   Result Value Ref Range    Lactic acid 2.1 (HH) 0.4 - 2.0 MMOL/L   HGB & HCT    Collection Time: 04/30/18 11:50 PM   Result Value Ref Range    HGB 12.0 11.5 - 16.0 g/dL    HCT 37.6 35.0 - 08.6 %   METABOLIC PANEL, COMPREHENSIVE    Collection Time: 05/01/18  4:02 AM   Result Value Ref Range    Sodium 136 136 - 145 mmol/L    Potassium 3.6 3.5 - 5.1 mmol/L    Chloride 106 97 - 108 mmol/L    CO2 21 21 - 32 mmol/L    Anion gap 9 5 - 15 mmol/L    Glucose 152 (H) 65 - 100 mg/dL    BUN 10 6 - 20 MG/DL    Creatinine 0.70 0.55 - 1.02 MG/DL    BUN/Creatinine ratio 14 12 - 20      GFR est AA >60 >60 ml/min/1.73m2    GFR est non-AA >60 >60 ml/min/1.73m2    Calcium 8.0 (L) 8.5 - 10.1 MG/DL    Bilirubin, total 0.9 0.2 - 1.0 MG/DL    ALT (SGPT) 14 12 - 78 U/L    AST (SGOT) 14 (L) 15 - 37 U/L    Alk. phosphatase 36 (L) 45 - 117 U/L    Protein, total 5.2 (L) 6.4 - 8.2 g/dL    Albumin 2.1 (L) 3.5 - 5.0 g/dL    Globulin 3.1 2.0 - 4.0 g/dL    A-G Ratio 0.7 (L) 1.1 - 2.2     CBC WITH AUTOMATED DIFF    Collection Time: 05/01/18  4:02 AM   Result Value Ref Range    WBC 10.5 3.6 - 11.0 K/uL    RBC 3.88 3.80 - 5.20 M/uL    HGB 11.4 (L) 11.5 - 16.0 g/dL    HCT 34.8 (L) 35.0 - 47.0 %    MCV 89.7 80.0 - 99.0 FL    MCH 29.4 26.0 - 34.0 PG    MCHC 32.8 30.0 - 36.5 g/dL    RDW 13.0 11.5 - 14.5 %    PLATELET 200 934 - 847 K/uL    MPV 11.2 8.9 - 12.9 FL    NRBC 0.0 0  WBC    ABSOLUTE NRBC 0.00 0.00 - 0.01 K/uL    NEUTROPHILS 86 (H) 32 - 75 %    BAND NEUTROPHILS 8 (H) 0 - 6 %    LYMPHOCYTES 3 (L) 12 - 49 %    MONOCYTES 3 (L) 5 - 13 %    EOSINOPHILS 0 0 - 7 %    BASOPHILS 0 0 - 1 %    IMMATURE GRANULOCYTES 0 %    ABS. NEUTROPHILS 9.9 (H) 1.8 - 8.0 K/UL    ABS. LYMPHOCYTES 0.3 (L) 0.8 - 3.5 K/UL    ABS. MONOCYTES 0.3 0.0 - 1.0 K/UL    ABS. EOSINOPHILS 0.0 0.0 - 0.4 K/UL    ABS. BASOPHILS 0.0 0.0 - 0.1 K/UL    ABS. IMM.  GRANS. 0.0 K/UL    DF MANUAL      RBC COMMENTS NORMOCYTIC, NORMOCHROMIC     LACTIC ACID    Collection Time: 05/01/18  4:02 AM   Result Value Ref Range    Lactic acid 1.9 0.4 - 2.0 MMOL/L   GLUCOSE, POC    Collection Time: 05/01/18  5:34 AM   Result Value Ref Range    Glucose (POC) 149 (H) 65 - 100 mg/dL    Performed by Sammy Birch    Blood Cx: NGTD for 10 hrs, x2  UCx: pending  MRSA: negative, preliminary     Imaging  CT abdomen/pelvis: multiple sigmoid diverticula, moderate ascites, 7 cm thickened segment of sigmoid colon, Right middle lobe 5 mm micronodule warrants future follow up  CXR: no acute process  Abd U/S: no acute process        Hospital Problems:  Hospital Problems  Date Reviewed: 4/11/2018          Codes Class Noted POA    Prediabetes ICD-10-CM: R73.03  ICD-9-CM: 790.29  4/30/2018 Yes        SIRS (systemic inflammatory response syndrome) ([facility] ICD-10-CM: R65.10  ICD-9-CM: 995.90  4/30/2018 Yes        * (Principal)Abdominal pain ICD-10-CM: R10.9  ICD-9-CM: 789.00  4/29/2018 Yes        Dementia without behavioral disturbance ICD-10-CM: F03.90  ICD-9-CM: 294.20  5/13/2015 Yes        GERD (gastroesophageal reflux disease) ICD-10-CM: K21.9  ICD-9-CM: 530.81  4/23/2010 Yes

## 2018-05-01 NOTE — PROGRESS NOTES
[address] [facility] [address] [place] [address]   Office [phone], Fax [phone]  Family Medicine Daily Progress Note: 2018      Assessment/Plan:   Romana Vinson is a 80 y.o. female with hx dementia, A-fib who is admitted for Abdominal pain and SIRS. 24hr Events: LEE overnight    Sepsis / Performated Appendicitis s/p Laparoscopic repair on 18: Per Surgery, patient had peritonitis with purulent exudate covering peritoneum of RLQ, ileum, sigmoid colon.    -routine post-op care  -MARCUS drain in place, decreased output from yesterday  -Dilaudid 0.5mg Q 6hrs PRN for pain  -Surgery following, appreciate recommendations  -Continue Cefepime and flagyl  -F/u blood and urine cx    Hyponatremia resolved: mild, POA : 135   - Sodium 134 today  - IVF  - daily CMP     GERD: stable, not on any home meds     Right middle lobe nodule: 5mm in size  - outpatient follow up     Dementia: Mild, stable not on any home meds  -Palliative consulted. Patient's family aware and will be in hospital this afternoon to discuss ACP. Dispo: PT recommended Rehab vs HH. Patient's family have stated preference for [facility] vs [facility]. Will follow up with CM about placement. FEN/Diet: clear liquids  VTE prophylaxis: SCDs  Consults: Surgery  Code Status: Full  Discussed plan of care with Patient/Family   Disposition:  TBD     Subjective:   Patient continues to repeat \"I think its better\" when questioned about several things including her pain.      Review of Systems:   Review of Systems: unable to obtain due to patient's mental status    Objective:     Physical examination  Patient Vitals for the past 12 hrs:   BP Temp Pulse Resp SpO2   18 0749 148/70 98.3 °F (36.8 °C) 78 18 99 %   18 0343 130/66 97.4 °F (36.3 °C) 79 18 96 %   18 0000 117/62 97.6 °F (36.4 °C) 74 18 97 %   18 2300 - - 76 - -   18 2037 127/63 98.4 °F (36.9 °C) 81 16 96 %     Temp (24hrs), Av.9 °F (36.6 °C), Min:97.4 °F (36.3 °C), Max:98.4 °F (36.9 °C)      Intake/Output Summary (Last 24 hours) at 05/02/18 0817  Last data filed at 05/02/18 9630   Gross per 24 hour   Intake              100 ml   Output             1005 ml   Net             -905 ml      O2 Flow Rate (L/min): 2 l/min   O2 Device: Room air     General:   Alert, cooperative, no acute distress   Head:   Atraumatic   Eyes:   Conjunctivae clear   ENT:  Oral mucosa normal   Neck:  Supple, trachea midline, no adenopathy   No JVD   Chest wall:    No tenderness or deformities    Lungs:   Clear to auscultation bilaterally    Heart:   Regular rhythm, no murmur   Abdomen:    soft, diffusely TTP. MARCUS drain in place with surgical bandaging. Extremities:  No edema or DVT signs   Pulses:  Symmetric all extremities   Skin:  Warm and dry    No rashes or lesions   Neurologic:  Oriented   No focal deficits   Psychiatric:  At baseline level of dementia. Oriented to person but not place or time. Data Review:       Recent Labs      05/02/18   0410  05/01/18   0402  04/30/18   2350   04/30/18   0006   WBC  10.4  10.5   --    --   14.6*   HGB  9.8*  11.4*  12.0   < >  12.8   HCT  30.7*  34.8*  37.6   < >  39.2   PLT  204  203   --    --   232    < > = values in this interval not displayed.      Recent Labs      05/02/18   0410  05/01/18   0402  04/30/18   0006  04/29/18   1718   NA  134*  136  135*  135*   K  3.7  3.6  3.6  3.7   CL  107  106  102  99   CO2  21  21  25  25   GLU  104*  152*  190*  187*   BUN  11  10  7  7   CREA  0.65  0.70  0.88  0.93   CA  8.0*  8.0*  8.3*  9.5   MG  2.2  1.5*  1.6   --    PHOS   --    --    --   3.1   ALB  1.8*  2.1*  2.9*  3.6   SGOT  14*  14*  14*  19   ALT  11*  14  21  24     Medications reviewed  Current Facility-Administered Medications   Medication Dose Route Frequency    ketorolac (TORADOL) injection 15 mg  15 mg IntraVENous Q6H    acetaminophen (TYLENOL) tablet 650 mg  650 mg Oral Q6H    docusate sodium (COLACE) capsule 100 mg  100 mg Oral DAILY    heparin (porcine) injection 5,000 Units  5,000 Units SubCUTAneous Q8H    insulin lispro (HUMALOG) injection   SubCUTAneous QID WITH MEALS    pantoprazole (PROTONIX) 40 mg in sodium chloride 0.9% 10 mL injection  40 mg IntraVENous Q12H    glucose chewable tablet 16 g  4 Tab Oral PRN    dextrose (D50W) injection syrg 12.5-25 g  12.5-25 g IntraVENous PRN    glucagon (GLUCAGEN) injection 1 mg  1 mg IntraMUSCular PRN    sodium chloride (NS) flush 5-10 mL  5-10 mL IntraVENous PRN    cefepime (MAXIPIME) 2 g in 0.9% sodium chloride (MBP/ADV) 100 mL  2 g IntraVENous Q8H    sodium chloride (NS) flush 5-10 mL  5-10 mL IntraVENous Q8H    sodium chloride (NS) flush 5-10 mL  5-10 mL IntraVENous PRN    0.9% sodium chloride infusion  100 mL/hr IntraVENous CONTINUOUS    naloxone (NARCAN) injection 0.4 mg  0.4 mg IntraVENous PRN    ondansetron (ZOFRAN) injection 4 mg  4 mg IntraVENous Q6H PRN    metroNIDAZOLE (FLAGYL) IVPB premix 500 mg  500 mg IntraVENous Q12H    HYDROmorphone (DILAUDID) injection 0.5 mg  0.5 mg IntraVENous Q6H PRN       Signed:   Luz Lopez MD   Resident, Family Medicine    Patient discussed with Dr. Yessica Abraham , [facility] Medicine Attending

## 2018-05-01 NOTE — PROGRESS NOTES
Palliative Medicine Consult  Carlos Enrique: [phone] ([phone])    Patient Name: Wendy Wild  YOB: 1934    Date of Initial Consult: 04/30/2018  Reason for Consult: Care decisions  Requesting Provider: Alicia Mckenna MD   Primary Care Physician: Wilton Clark MD     SUMMARY:   Wendy Wild is a 80 y.o. with a past history of dementia, GERD, UTI, and anxiety who was admitted on 4/29/2018 from home with a diagnosis of abdominal pain. Patient presented to the ED with complaints of generalized weakness, abdominal pain, vomiting, and decreased appetite for the last few days. Work-up revealed elevated lactic acid and leukocytosis. Patient admitted to ICU and started on IVF and broad spectrum antibiotics. Blood and urine cultures pending. Initial CT abdomen/pelvis was read as diverticulosis but has been reviewed by radiologist again today who raised concern for perforated appendicitis. Plan for laparoscopic appendectomy later today (4/30). Current medical issues leading to Palliative Medicine involvement include: care decisions due to risk for decline. Interim History:  05/1--> Patient underwent lap appendectomy last night. No acute events post-op. Transferred out of ICU this am.      PALLIATIVE DIAGNOSES:   1. Impaired memory  2. Abdominal Pain  3. Physical debility  4. Goals of care  5. DNR discussion  6. Concern for perforated appendix- for OR today  7. Baseline Dementia-- FAST scale 6C       PLAN:   1. Met with patient in follow-up and introduced the role of Palliative Medicine to , Don Fernandes. 2. Patient continues to be pleasantly confusion. 3. Symptom Management--> Patient currently denies any pain and no s/s of discomfort. Has only received one dose of IV dilaudid overnight (at 4am) and currently pain controlled on scheduled tylenol. Continue to optimize symptom management.   4. Goals of care--> Discussed current hospitalization and chronic comorbidities with patient's . Maria Del Carmen Brink described patient's level of functioning prior to admission- was able to ambulate but, due to memory issues, requires multiple cues and redirection in order to complete basic tasks/ADLs. Is unable to prepare meals or perform any household duties. Maria Del Carmen Brink endorses that patient's memory impairment started several years ago and has been progressive to the point that she requires 24 hour care at home and cannot be left alone. Discussed PT/OT recommendations and possibility of patient benefiting from short stay at SNF for skilled therapies and  wishes to pursue this. Explained that this would not be long term and questioned whether he is still able to support the patient at home moving forward, to which he replied \"I don't think so\". Discussed options of LTC placement versus hiring private paid caregivers to assist in providing 24/7 care for the patient, and Maria Del Carmen Brink is considering all of these options going forward. Given FAST scale of 6C, patient currently doesn't qualify for hospice services under Medicare Guidelines for the diagnosis of dementia, but given progressive nature of disease, may be able to utilize the support services in the future. Will follow-up tomorrow 5/2 at 1pm with  and daughter to continue to address goals of care. 5. DNR discussion--> Discussed code status at length with Maria Del Carmen Brink, including patient's debilitation and the unlikelihood of meaningful recovery in the event of cardiopulmonary arrest. Addressed what patient would want done in the event of cardiopulmonary arrest and what resuscitation would entail. Maria Del Carmen Brink states that he and the patient never discussed what she would want done in this situation and elects that patient remain FULL CODE, given this uncertainty. Would like to think about this further and is willing to discuss it further tomorrow. 6. ACP--> No AMD on file and patient unable to make her own decisions due to dementia;   confirms that she has not completed an AMD. Therefore, default surrogate decision maker is legal next of kin which is her  Natalya Luu ([phone]). ACP updated and will confirm with family if patient has completed ACP documents. 7. Initial consult note routed to primary continuity provider  8. Communicated plan of care with: Palliative IDT       GOALS OF CARE / TREATMENT PREFERENCES:     GOALS OF CARE:  Patient/Health Care Proxy Stated Goals:  (Continue to recover and attempt rehab at [facility]; Family considering LTC)      TREATMENT PREFERENCES:   Code Status: Full Code    Advance Care Planning:  Advance Care Planning 5/1/2018   Patient's Healthcare Decision Maker is: Legal Next of David 69   Primary Decision Maker Name Natalya Luu   Primary Decision Maker Phone Number [phone]   Primary Decision Maker Relationship to Patient Spouse   Confirm Advance Directive None   Does the patient have other document types -       Medical Interventions: Full interventions   Other Instructions:   Artificially Administered Nutrition:  (not addressed)     Other:    As far as possible, the palliative care team has discussed with patient / health care proxy about goals of care / treatment preferences for patient. HISTORY:     History obtained from: chart, bedside nurse    CHIEF COMPLAINT: generalized weakness, abdominal pain, vomiting    HPI/SUBJECTIVE:    The patient is:   [] Verbal and participatory  [x] Non-participatory due to: baseline dementia      Patient presented to the ED with complaints of generalized weakness, abdominal pain, vomiting, and decreased appetite for the last few days. Work-up revealed elevated lactic acid and leukocytosis. Patient admitted to ICU and started on IVF and broad spectrum antibiotics. Blood and urine cultures pending. Initial CT abdomen/pelvis was read as diverticulosis but has been reviewed by radiologist again today who raised concern for perforated appendicitis.  Plan for laparoscopic appendectomy 4/30. Interim History:  05/1--> Patient underwent lap appendectomy last night. No acute events post-op. Transferred out of ICU this am.     Clinical Pain Assessment (nonverbal scale for severity on nonverbal patients):   Clinical Pain Assessment  Severity: 0  Location: abdomen  Character: generalized  Duration: several days  Effect: limited movement  Factors: movement, palpation  Frequency: constant     Activity (Movement): Lying quietly, normal position    Duration: for how long has pt been experiencing pain (e.g., 2 days, 1 month, years)  Frequency: how often pain is an issue (e.g., several times per day, once every few days, constant)     FUNCTIONAL ASSESSMENT:     Palliative Performance Scale (PPS):  PPS: 60       PSYCHOSOCIAL/SPIRITUAL SCREENING:     Palliative IDT has assessed this patient for cultural preferences / practices and a referral made as appropriate to needs (Cultural Services, Patient Advocacy, Ethics, etc.)    Advance Care Planning:  Advance Care Planning 5/1/2018   Patient's Healthcare Decision Maker is: Legal Next of David Keith   Primary Decision Maker Name Haley Marie   Primary Decision Maker Phone Number [phone]   Primary Decision Maker Relationship to Patient Spouse   Confirm Advance Directive None   Does the patient have other document types -       Any spiritual / Moravian concerns:  [] Yes /  [x] No    Caregiver Burnout:  [x] Yes /  [] No /  [] No Caregiver Present      Anticipatory grief assessment:   [x] Normal  / [] Maladaptive       ESAS Anxiety: Anxiety: 0    ESAS Depression:          REVIEW OF SYSTEMS:     Positive and pertinent negative findings in ROS are noted above in HPI. The following systems were [] reviewed / [x] unable to be reviewed as noted in HPI  Other findings are noted below.   Systems: constitutional, ears/nose/mouth/throat, respiratory, gastrointestinal, genitourinary, musculoskeletal, integumentary, neurologic, psychiatric, endocrine. Positive findings noted below. Modified ESAS Completed by: provider     Drowsiness: 3     Pain: 0   Anxiety: 0       Dyspnea: 0                    PHYSICAL EXAM:     From RN flowsheet:  Wt Readings from Last 3 Encounters:   05/01/18 130 lb 8.2 oz (59.2 kg)   04/05/18 119 lb (54 kg)   07/25/17 128 lb (58.1 kg)     Blood pressure 127/58, pulse 93, temperature 97.2 °F (36.2 °C), resp. rate 16, height 5' 3\" (1.6 m), weight 130 lb 8.2 oz (59.2 kg), last menstrual period 01/31/1995, SpO2 95 %.     Pain Scale 1: Visual  Pain Intensity 1: 0  Pain Onset 1: Post op  Pain Location 1: Abdomen  Pain Orientation 1: Right, Lower  Pain Description 1: Sore  Pain Intervention(s) 1: Medication (see MAR)  Last bowel movement, if known:     Constitutional: NAD, sitting up in chair  Eyes: pupils equal, anicteric  ENMT: no nasal discharge, moist mucous membranes  Cardiovascular: regular rhythm, distal pulses intact, no LARA  Respiratory: breathing not labored, symmetric  Gastrointestinal: lap incisions without redness, +bowel sounds  Musculoskeletal: no deformity, no tenderness to palpation  Skin: warm, dry  Neurologic: following commands, moving all extremities, +memory impairment requiring reorientation  Psychiatric: full affect, no hallucinations  Other:       HISTORY:     Principal Problem:    Abdominal pain (4/29/2018)    Active Problems:    GERD (gastroesophageal reflux disease) (4/23/2010)      Dementia without behavioral disturbance (5/13/2015)      Prediabetes (4/30/2018)      SIRS (systemic inflammatory response syndrome) (HCC) (4/30/2018)      Past Medical History:   Diagnosis Date    Alzheimer disease     Anxiety 4/23/2010    GERD (gastroesophageal reflux disease) 4/23/2010    Mild dementia     UTI (lower urinary tract infection) 4/23/2010      Past Surgical History:   Procedure Laterality Date    ENDOSCOPY, COLON, DIAGNOSTIC      HX CATARACT REMOVAL  5/2011    left    HX UROLOGICAL      bladder uplift Family History   Problem Relation Age of Onset    Cancer Father       History reviewed, no pertinent family history.   Social History   Substance Use Topics    Smoking status: Never Smoker    Smokeless tobacco: Never Used    Alcohol use No     Allergies   Allergen Reactions    Macrodantin [Nitrofurantoin Macrocrystalline] Rash    Pcn [Penicillins] Rash    Sulfa (Sulfonamide Antibiotics) Rash      Current Facility-Administered Medications   Medication Dose Route Frequency    ketorolac (TORADOL) injection 15 mg  15 mg IntraVENous Q6H    acetaminophen (TYLENOL) tablet 650 mg  650 mg Oral Q6H    docusate sodium (COLACE) capsule 100 mg  100 mg Oral DAILY    heparin (porcine) injection 5,000 Units  5,000 Units SubCUTAneous Q8H    pantoprazole (PROTONIX) 40 mg in sodium chloride 0.9% 10 mL injection  40 mg IntraVENous Q12H    glucose chewable tablet 16 g  4 Tab Oral PRN    dextrose (D50W) injection syrg 12.5-25 g  12.5-25 g IntraVENous PRN    glucagon (GLUCAGEN) injection 1 mg  1 mg IntraMUSCular PRN    insulin lispro (HUMALOG) injection   SubCUTAneous Q6H    sodium chloride (NS) flush 5-10 mL  5-10 mL IntraVENous PRN    cefepime (MAXIPIME) 2 g in 0.9% sodium chloride (MBP/ADV) 100 mL  2 g IntraVENous Q8H    sodium chloride (NS) flush 5-10 mL  5-10 mL IntraVENous Q8H    sodium chloride (NS) flush 5-10 mL  5-10 mL IntraVENous PRN    0.9% sodium chloride infusion  100 mL/hr IntraVENous CONTINUOUS    naloxone (NARCAN) injection 0.4 mg  0.4 mg IntraVENous PRN    ondansetron (ZOFRAN) injection 4 mg  4 mg IntraVENous Q6H PRN    metroNIDAZOLE (FLAGYL) IVPB premix 500 mg  500 mg IntraVENous Q12H    HYDROmorphone (DILAUDID) injection 0.5 mg  0.5 mg IntraVENous Q6H PRN          LAB AND IMAGING FINDINGS:     Lab Results   Component Value Date/Time    WBC 10.5 05/01/2018 04:02 AM    HGB 11.4 (L) 05/01/2018 04:02 AM    PLATELET 881 45/28/9739 04:02 AM     Lab Results   Component Value Date/Time    Sodium 136 05/01/2018 04:02 AM    Potassium 3.6 05/01/2018 04:02 AM    Chloride 106 05/01/2018 04:02 AM    CO2 21 05/01/2018 04:02 AM    BUN 10 05/01/2018 04:02 AM    Creatinine 0.70 05/01/2018 04:02 AM    Calcium 8.0 (L) 05/01/2018 04:02 AM    Magnesium 1.5 (L) 05/01/2018 04:02 AM    Phosphorus 3.1 04/29/2018 05:18 PM      Lab Results   Component Value Date/Time    AST (SGOT) 14 (L) 05/01/2018 04:02 AM    Alk. phosphatase 36 (L) 05/01/2018 04:02 AM    Protein, total 5.2 (L) 05/01/2018 04:02 AM    Albumin 2.1 (L) 05/01/2018 04:02 AM    Globulin 3.1 05/01/2018 04:02 AM     Lab Results   Component Value Date/Time    INR 1.1 06/30/2010 12:00 PM    Prothrombin time 10.9 06/30/2010 12:00 PM      No results found for: IRON, FE, TIBC, IBCT, PSAT, FERR   No results found for: PH, PCO2, PO2  No components found for: GLPOC   No results found for: CPK, CKMB             Total time: 35 min  Counseling / coordination time, spent as noted above: 30 min  > 50% counseling / coordination?: yes    Prolonged service was provided for  []30 min   []75 min in face to face time in the presence of the patient, spent as noted above. Time Start:   Time End:   Note: this can only be billed with 61521 (initial) or 86586 (follow up). If multiple start / stop times, list each separately.

## 2018-05-01 NOTE — PROGRESS NOTES
Pt is being transferred to room 427. Once pt is able to work with PT & OT,recommendations will be discussed with pt.'s  and daughter. Hand-off given to 4th floor .     Shawn Dooley

## 2018-05-02 NOTE — PROGRESS NOTES
Spiritual Care Assessment/Progress Note  [address]      NAME: Kem Moore      MRN: [id number]  AGE: 80 y.o.  SEX: female  Evangelical Affiliation: Sue   Language: English     5/2/2018     Total Time (in minutes): 13     Spiritual Assessment begun in [facility] 4[facility] POST SURG ORT 2 through conversation with:         [x]Patient        [] Family    [] Friend(s)        Reason for Consult: Palliative Care, Initial/Spiritual Assessment     Spiritual beliefs: (Please include comment if needed)     [] Identifies with a bill tradition:     [] Supported by a bill community:      [] Claims no spiritual orientation:      [] Seeking spiritual identity:           [x] Adheres to an individual form of spirituality:      [] Not able to assess:                     Identified resources for coping:      [] Prayer                               [] Music                  [] Guided Imagery     [x] Family/friends                 [] Pet visits     [] Devotional reading                         [] Unknown     [] Other:                                              Interventions offered during this visit: (See comments for more details)    Patient Interventions: Affirmation of emotions/emotional suffering, Affirmation of bill, Catharsis/review of pertinent events in supportive environment, Iconic (affirming the presence of God/Higher Power), Initial/Spiritual assessment, patient floor, Prayer (assurance of)           Plan of Care:     [] Support spiritual and/or cultural needs    [] Support AMD and/or advance care planning process      [] Support grieving process   [] Coordinate Rites and/or Rituals    [] Coordination with community clergy   [] No spiritual needs identified at this time   [] Detailed Plan of Care below (See Comments)  [] Make referral to Music Therapy  [] Make referral to Pet Therapy     [] Make referral to Addiction services  [] Make referral to [facility]  [] Make referral to Spiritual Care Partner  [] No future visits requested        [x] Follow up visits as needed     Comments: Initial spiritual assessment in 4 Post Surg Ort. Miss Katiuska Montemayor expressed she grew up in [employer] and knows God is with her. She mentioned several times that she and her  and friends are in their [de-identified] and unable to do things as they used to. She shared that that is hard. Affirmed her feelings and struggles. She is receptive to  presence. Provided spiritual presence and assurance of prayer. Chaplains available as able and/or needed.   Visited by: Franca Cummings [id number] [facility] ([id number])

## 2018-05-02 NOTE — PROGRESS NOTES
Bedside and Verbal shift change report given to Erickson Pennington RN (oncoming nurse) by ELAN Blancas (offgoing nurse). Report given with SBAR, Kardex, OR Summary, Intake/Output, MAR and Recent Results.

## 2018-05-02 NOTE — PROGRESS NOTES
Problem: Mobility Impaired (Adult and Pediatric)  Goal: *Acute Goals and Plan of Care (Insert Text)  Physical Therapy Goals  Initiated 5/1/2018  1. Patient will move from supine to sit and sit to supine , scoot up and down and roll side to side in bed with independence within 7 day(s). 2.  Patient will transfer from bed to chair and chair to bed with independence using the least restrictive device within 7 day(s). 3.  Patient will perform sit to stand with modified independence within 7 day(s). 4.  Patient will ambulate with modified independence for 200 feet with the least restrictive device within 7 day(s). physical Therapy TREATMENT  Patient: Tee Salas (28 y.o. female)  Date: 5/2/2018  Diagnosis: Abdominal pain  Appendicitis Abdominal pain  Procedure(s) (LRB):  APPENDECTOMY LAPAROSCOPIC  (N/A) 2 Days Post-Op  Precautions:    Chart, physical therapy assessment, plan of care and goals were reviewed. ASSESSMENT:  Patient supine upon arrival, cleared for session by RN, who encouraged attempt at [facility] as denise removed earlier in day. Patient continues to be pleasantly confused, needed frequent redirection during session and reminders as to why she was here and details of surgery. Overall MIN A for bed mobility, transfesr and gait with RW. Difficulty initially with full trunk extension 2/2 abdominal pain but with cues and support, balance improved significantly. AMbulated first to [facility] where patient able to pass flatus but no urine. Then ambulated with RW and MIN A to other side of room to bedside chair. Manual assist for RW management and intermittent trunk support due to tendency to flex forward. Positioned in chair with alarm in place and patient in NAD. Improved activity tolerance, posture and balance today but continues to have difficutly with new learning 2/2 memory deficits.       Progression toward goals:  []    Improving appropriately and progressing toward goals  [x]    Improving slowly and progressing toward goals  []    Not making progress toward goals and plan of care will be adjusted     PLAN:  Patient continues to benefit from skilled intervention to address the above impairments. Continue treatment per established plan of care. Discharge Recommendations:  Rehab vs Home Health, pending progress  Further Equipment Recommendations for Discharge:  TBD     SUBJECTIVE:   Patient stated Is is normal for people's bladders to do this (referring to appendix), did I wait too long to come in? Vazquez Mealing    OBJECTIVE DATA SUMMARY:   Critical Behavior:  Neurologic State: Alert, Confused  Orientation Level: Oriented to person, Disoriented to place, Disoriented to situation, Disoriented to time  Cognition: Follows commands  Safety/Judgement: Decreased awareness of environment  Functional Mobility Training:  Bed Mobility:  Rolling: Minimum assistance;Assist x1 (cues again for log roll technique)  Supine to Sit: Minimum assistance              Transfers:  Sit to Stand: Minimum assistance;Assist x1  Stand to Sit: Minimum assistance;Assist x1        Bed to Chair: Minimum assistance                    Balance:  Sitting: Impaired  Sitting - Static: Good (unsupported)  Sitting - Dynamic: Fair (occasional)  Standing: Impaired  Standing - Static: Fair;Constant support  Standing - Dynamic : Fair  Ambulation/Gait Training:  Distance (ft): 20 Feet (ft)  Assistive Device: Gait belt;Walker, rolling  Ambulation - Level of Assistance: Minimal assistance; Additional time (RW assist with turns/obstacles, mild trunk support)        Gait Abnormalities: Antalgic;Decreased step clearance        Base of Support: Widened     Speed/Kassandra: Slow;Shuffled                                  Activity Tolerance:   Low - ambulated 5' to [facility] then 15' around bed to bedside chair, MIN A x 1    Please refer to the flowsheet for vital signs taken during this treatment.   After treatment:   [x]    Patient left in no apparent distress sitting up in chair  []    Patient left in no apparent distress in bed  [x]    Call bell left within reach  [x]    Nursing notified  []    Caregiver present  [x]    Chair alarm activated    COMMUNICATION/COLLABORATION:   The patients plan of care was discussed with: Registered Nurse    Mounika Vo, PT   Time Calculation: 24 mins

## 2018-05-02 NOTE — PROGRESS NOTES
Problem: Self Care Deficits Care Plan (Adult)  Goal: *Acute Goals and Plan of Care (Insert Text)  Occupational Therapy Goals  Initiated 5/1/2018  1. Patient will perform grooming with supervision/setup standing at the sink >5 minutes within 7 day(s). 2.  Patient will perform upper body dressing and bathing with modified independence within 7 day(s). 3.  Patient will perform lower body dressing and bathing with minimum assistance within 7 day(s). 4.  Patient will perform toilet transfers with supervision/set-up using rolling walker within 7 day(s). 5.  Patient will perform all aspects of toileting with minimal assistance within 7 day(s). 6.  Patient will participate in upper extremity therapeutic exercise/activities with modified independence for 10 minutes within 7 day(s). 7.  Patient will utilize energy conservation techniques during functional activities with verbal cues within 7 day(s). Occupational Therapy TREATMENT  Patient: Nahun Rubin (89 y.o. female)  Date: 5/2/2018  Diagnosis: Abdominal pain  Appendicitis Abdominal pain  Procedure(s) (LRB):  APPENDECTOMY LAPAROSCOPIC  (N/A) 2 Days Post-Op  Precautions:    Chart, occupational therapy assessment, plan of care, and goals were reviewed. ASSESSMENT:  Pt pleasantly confused, agreeable to OT. Sit to stand with min assist from chair and transfer to bedside commode with min assist. Pt asked questions regarding whether her  knows she is here and about recent events several times within short time span. She was unable to void and assisted back to bed to receive ECHO. Will cont to follow. Recommend SNF for rehab. Progression toward goals:  []       Improving appropriately and progressing toward goals  [x]       Improving slowly and progressing toward goals  []       Not making progress toward goals and plan of care will be adjusted     PLAN:  Patient continues to benefit from skilled intervention to address the above impairments. Continue treatment per established plan of care. Discharge Recommendations: SNF for Rehab  Further Equipment Recommendations for Discharge:  None     SUBJECTIVE:   Patient stated Hue Peña my  know I am here?     OBJECTIVE DATA SUMMARY:   Cognitive/Behavioral Status:  Neurologic State: Alert;Confused  Orientation Level: Oriented to person  Cognition: Follows commands             Functional Mobility and Transfers for ADLs:  Bed Mobility:  Rolling: Minimum assistance;Assist x1 (cues again for log roll technique)  Supine to Sit: Minimum assistance    Transfers:  Sit to Stand: Minimum assistance;Assist x1  Functional Transfers  Toilet Transfer : Minimum assistance  Adaptive Equipment: Bedside commode;Walker (comment)  Bed to Chair: Minimum assistance    Balance:  Sitting: Impaired  Sitting - Static: Good (unsupported)  Sitting - Dynamic: Fair (occasional)  Standing: Impaired  Standing - Static: Fair;Constant support  Standing - Dynamic : Fair    ADL Intervention:     Toileting  Bladder Hygiene: Minimum assistance and to transfer to [facility]     Activity Tolerance:   Fair  Please refer to the flowsheet for vital signs taken during this treatment.   After treatment:   [] Patient left in no apparent distress sitting up in chair  [x] Patient left in no apparent distress in bed  [x] Call bell left within reach  [x] Nursing notified  [] Caregiver present  [] Bed alarm activated    COMMUNICATION/COLLABORATION:   The patients plan of care was discussed with: Occupational Therapist and Registered Nurse    MARIELA Betancur  Time Calculation: 15 mins

## 2018-05-02 NOTE — CONSULTS
Vikas Earl MD [facility] [address]  Office [phone]      Date of  Admission: 4/29/2018  5:05 PM       Assessment/Plan:   · New onset a fib in setting of sepsis/perforated appendix, s/p lap thanh/anxiety. Would cont IV fluids, monitor electrolytes. Start po short acting dilt. Her CHADs 2 Vasc score is 3. She has a 3.2% risk per yr of embolic event without anticoagulation. Will need to assess fall risk and discuss with family. · Dementia: inc anxiety this pm  · GERD:  · Perforated appendix s/p lap appy 4/30/18: tx per surgery. Pt personally seen and examined. Chart reviewed. Agree with advanced NP's history, exam and  A/P with changes/additons. Pt is a poor historian  Neck-no JVD  CVS-S1-S2 present, Irr, 2/6 systolic murmur present  RS-   CTAB ant  LE-   No  edema        Jhonny Paul [place]. MD, [facility]      Thank you for allowing us to participate in Clint Saenz care. We will be happy to follow along. Please call for any questions. Consult requested by Laith De León DO for Abdominal pain  Appendicitis    Subjective:  Clint Saenz is a 80 y.o.   female  with PMH significant for dementia, GERD, anxiety who was admitted with abdominal pain. AP started suddenly the am of admission,  moderate, sharp, diffuse, no radiation, associated with subjective chills, nausea, vomiting x 1, generalized weakness. Patient was seen by PCP on 4/05/18, treated for Citrobacter UTI with cipro on 4/13 for 5 days. She was found to be septic  2/2 perforated appe: lap appendectomy on 4/30. This am she deveeoped a fib RVR in the 120's. Pt is poor historian and unable to give hx due to dementia, no family present. ECHO in process. TSH nml, K 3.7, Mg++ 2.2. Nml ECHO on 2012.            The patient denies chest pain, dependent edema, diaphoresis, GABRIEL,  palpitations,  shortness of breath.              Cardiographics: Telemetry: a fib   ECG: a fib RVR    Cardiac Testing/ Procedures: A. Cardiac Cath/PCI:   B.ECHO/JUAN: 2012 nml EF   C. StressNuclear/Stress ECHO/Stress test:   D.Vascular:   E. EP:   F. Miscellaneous         Patient Active Problem List    Diagnosis Date Noted    Prediabetes 04/30/2018    SIRS (systemic inflammatory response syndrome) (HCC) 04/30/2018    Abdominal pain 04/29/2018    Dementia without behavioral disturbance 05/13/2015    Memory loss 05/13/2015    Fatigue 04/10/2015    Urinary urgency 09/27/2012    Dizziness 09/20/2012    Nonspecific abnormal electrocardiogram (ECG) (EKG) 09/20/2012    Anxiety 04/23/2010    GERD (gastroesophageal reflux disease) 04/23/2010      Past Medical History:   Diagnosis Date    Alzheimer disease     Anxiety 4/23/2010    GERD (gastroesophageal reflux disease) 4/23/2010    Mild dementia     UTI (lower urinary tract infection) 4/23/2010      Past Surgical History:   Procedure Laterality Date    ENDOSCOPY, COLON, DIAGNOSTIC      HX CATARACT REMOVAL  5/2011    left    HX UROLOGICAL      bladder uplift     Allergies   Allergen Reactions    Macrodantin [Nitrofurantoin Macrocrystalline] Rash    Pcn [Penicillins] Rash    Sulfa (Sulfonamide Antibiotics) Rash      Current Facility-Administered Medications   Medication Dose Route Frequency    methylnaltrexone (RELISTOR) injection 8 mg  8 mg SubCUTAneous DAILY    bisacodyl (DULCOLAX) suppository 10 mg  10 mg Rectal DAILY PRN    dextrose 5% and 0.9% NaCl infusion  75 mL/hr IntraVENous CONTINUOUS    oxyCODONE IR (ROXICODONE) tablet 5 mg  5 mg Oral Q4H PRN    ketorolac (TORADOL) injection 15 mg  15 mg IntraVENous Q6H    acetaminophen (TYLENOL) tablet 650 mg  650 mg Oral Q6H    docusate sodium (COLACE) capsule 100 mg  100 mg Oral DAILY    heparin (porcine) injection 5,000 Units  5,000 Units SubCUTAneous Q8H    insulin lispro (HUMALOG) injection   SubCUTAneous QID WITH MEALS    pantoprazole (PROTONIX) 40 mg in sodium chloride 0.9% 10 mL injection  40 mg IntraVENous Q12H    glucose chewable tablet 16 g  4 Tab Oral PRN    dextrose (D50W) injection syrg 12.5-25 g  12.5-25 g IntraVENous PRN    glucagon (GLUCAGEN) injection 1 mg  1 mg IntraMUSCular PRN    sodium chloride (NS) flush 5-10 mL  5-10 mL IntraVENous PRN    cefepime (MAXIPIME) 2 g in 0.9% sodium chloride (MBP/ADV) 100 mL  2 g IntraVENous Q8H    sodium chloride (NS) flush 5-10 mL  5-10 mL IntraVENous Q8H    sodium chloride (NS) flush 5-10 mL  5-10 mL IntraVENous PRN    naloxone (NARCAN) injection 0.4 mg  0.4 mg IntraVENous PRN    ondansetron (ZOFRAN) injection 4 mg  4 mg IntraVENous Q6H PRN    metroNIDAZOLE (FLAGYL) IVPB premix 500 mg  500 mg IntraVENous Q12H    HYDROmorphone (DILAUDID) injection 0.5 mg  0.5 mg IntraVENous Q6H PRN          Review of Symptoms:  Constitutional: negative for fevers  ENT: negative   Respiratory: negative for dyspnea on exertion  Gastrointestinal: positive for abdominal pain  Genitourinary: rentention   Musculoskeletal:negative for back pain  Neurological: positive for memory problems  Other systems reviewed and negative except as above. Social History     Social History    Marital status:      Spouse name: N/A    Number of children: N/A    Years of education: N/A     Social History Main Topics    Smoking status: Never Smoker    Smokeless tobacco: Never Used    Alcohol use No    Drug use: No    Sexual activity: Not Currently     Other Topics Concern    None     Social History Narrative     Family History   Problem Relation Age of Onset    Cancer Father          Physical Exam    Visit Vitals    /69 (BP 1 Location: Left arm, BP Patient Position: Sitting)    Pulse 98    Temp 98.3 °F (36.8 °C)    Resp 18    Ht 5' 3\" (1.6 m)    Wt 130 lb 8.2 oz (59.2 kg)    LMP 01/31/1995    SpO2 96%    BMI 23.12 kg/m2     General: awake, alert, and oriented to person only . MAEx4.  No acute distress   HEENT Exam: Normocephalic, atraumatic. Sclera anicteric . Neck: supple  Lung Exam:     Not  dyspneic. Respirations unlabored. O2 @ 96% room air Sat: . Lungs: No wheezes, crackles, rhonchi, or rubs heard on auscultation. Heart Exam:       PMI nondisplaced, irregular rhythm. No murmur     No JVD,  no carotid bruits,      Abdomen Exam:     soft, tender. + Bowel sounds. MARCUS drain, lg abd dsg. : voiding     Extremities Exam:      Atraumatic. No  edema   Vascular Exam:      radial,  dorsalis pedis, posterior tibial, are equal and strong bilaterally     Neuro exam:  No focal deficits   Psy Exam: Normal affect, anxious, repetitive speech. Recent Results (from the past 12 hour(s))   METABOLIC PANEL, COMPREHENSIVE    Collection Time: 05/02/18  4:10 AM   Result Value Ref Range    Sodium 134 (L) 136 - 145 mmol/L    Potassium 3.7 3.5 - 5.1 mmol/L    Chloride 107 97 - 108 mmol/L    CO2 21 21 - 32 mmol/L    Anion gap 6 5 - 15 mmol/L    Glucose 104 (H) 65 - 100 mg/dL    BUN 11 6 - 20 MG/DL    Creatinine 0.65 0.55 - 1.02 MG/DL    BUN/Creatinine ratio 17 12 - 20      GFR est AA >60 >60 ml/min/1.73m2    GFR est non-AA >60 >60 ml/min/1.73m2    Calcium 8.0 (L) 8.5 - 10.1 MG/DL    Bilirubin, total 0.6 0.2 - 1.0 MG/DL    ALT (SGPT) 11 (L) 12 - 78 U/L    AST (SGOT) 14 (L) 15 - 37 U/L    Alk.  phosphatase 34 (L) 45 - 117 U/L    Protein, total 5.0 (L) 6.4 - 8.2 g/dL    Albumin 1.8 (L) 3.5 - 5.0 g/dL    Globulin 3.2 2.0 - 4.0 g/dL    A-G Ratio 0.6 (L) 1.1 - 2.2     CBC WITH AUTOMATED DIFF    Collection Time: 05/02/18  4:10 AM   Result Value Ref Range    WBC 10.4 3.6 - 11.0 K/uL    RBC 3.39 (L) 3.80 - 5.20 M/uL    HGB 9.8 (L) 11.5 - 16.0 g/dL    HCT 30.7 (L) 35.0 - 47.0 %    MCV 90.6 80.0 - 99.0 FL    MCH 28.9 26.0 - 34.0 PG    MCHC 31.9 30.0 - 36.5 g/dL    RDW 13.2 11.5 - 14.5 %    PLATELET 699 950 - 430 K/uL    MPV 10.6 8.9 - 12.9 FL    NRBC 0.0 0  WBC    ABSOLUTE NRBC 0.00 0.00 - 0.01 K/uL    NEUTROPHILS 81 (H) 32 - 75 % LYMPHOCYTES 11 (L) 12 - 49 %    MONOCYTES 6 5 - 13 %    EOSINOPHILS 0 0 - 7 %    BASOPHILS 0 0 - 1 %    IMMATURE GRANULOCYTES 1 (H) 0.0 - 0.5 %    ABS. NEUTROPHILS 8.5 (H) 1.8 - 8.0 K/UL    ABS. LYMPHOCYTES 1.2 0.8 - 3.5 K/UL    ABS. MONOCYTES 0.6 0.0 - 1.0 K/UL    ABS. EOSINOPHILS 0.0 0.0 - 0.4 K/UL    ABS. BASOPHILS 0.0 0.0 - 0.1 K/UL    ABS. IMM.  GRANS. 0.1 (H) 0.00 - 0.04 K/UL    DF AUTOMATED     MAGNESIUM    Collection Time: 05/02/18  4:10 AM   Result Value Ref Range    Magnesium 2.2 1.6 - 2.4 mg/dL   TSH 3RD GENERATION    Collection Time: 05/02/18  4:11 AM   Result Value Ref Range    TSH 3.53 0.36 - 3.74 uIU/mL   GLUCOSE, POC    Collection Time: 05/02/18  8:22 AM   Result Value Ref Range    Glucose (POC) 87 65 - 100 mg/dL    Performed by Dalton Coleman (PCT)    GLUCOSE, POC    Collection Time: 05/02/18 11:24 AM   Result Value Ref Range    Glucose (POC) 117 (H) 65 - 100 mg/dL    Performed by Dalton Coleman (PCT)    HGB & HCT    Collection Time: 05/02/18 12:12 PM   Result Value Ref Range    HGB 10.5 (L) 11.5 - 16.0 g/dL    HCT 31.8 (L) 35.0 - 47.0 %   EKG, 12 LEAD, INITIAL    Collection Time: 05/02/18 12:54 PM   Result Value Ref Range    Ventricular Rate 96 BPM    Atrial Rate 340 BPM    QRS Duration 78 ms    Q-T Interval 332 ms    QTC Calculation (Bezet) 419 ms    Calculated R Axis -25 degrees    Calculated T Axis -2 degrees    Diagnosis       Atrial fibrillation  Nonspecific T wave abnormality  Abnormal ECG  When compared with ECG of 29-APR-2018 20:22,  Atrial fibrillation has replaced Sinus rhythm  Criteria for Septal infarct are no longer present         Che Baldwin MS [facility]

## 2018-05-02 NOTE — CDMP QUERY
=> Hypomagnesemia in the setting of low Mg 1.5 treated with IV Mag sulfate 2g x 1, now resolved    => Other explanation of clinical findings   => Clinically Undetermined (no explanation for clinical findings)    The medical record reflects the following clinical findings, treatment, and risk factors. Risk Factors: 79 yo F admitted with Sepsis 2/2 perforated appendicitis s/p appendectomy    Clinical Indicators:  5/1 Mg  1.5   5/2  Mg 2.2   Treatment: IV Mag sulfate 2 g x 1      Please clarify and document your clinical opinion in the progress notes and discharge summary including the definitive and/or presumptive diagnosis, (suspected or probable), related to the above clinical findings. Please include clinical findings supporting your diagnosis.     Thank you for your time   [facility] RN/BSN, [address]  Desk:   [phone]   Other:  [phone]

## 2018-05-02 NOTE — PROGRESS NOTES
5/2/2018 4:23 PM Consult for SNF placement received. Message sent to [facility], referral is still pending. [facility] has accepted. CM will follow up. 5/2/2018  12:48 PM [facility] can accept pt. Pending with [facility], spoke with admissions. 5/2/2018 11:46 AM Referrals pending with [facility] and [facility], messages sent via [employer]. CM will follow.  MARTITA Abdullahi

## 2018-05-02 NOTE — PROGRESS NOTES
Notified by RN that telemetry had reported patient was in atrial fibrillation. Went to evaluate patient, denies any chest pain, palpitations or shortness of breath. Visit Vitals    /66 (BP 1 Location: Left arm, BP Patient Position: Head of bed elevated (Comment degrees))    Pulse 81    Temp 97.9 °F (36.6 °C)    Resp 17    Ht 5' 3\" (1.6 m)    Wt 130 lb 8.2 oz (59.2 kg)    SpO2 96%    BMI 23.12 kg/m2       General: In no acute distress. CV: Irregularly irregular rhythm. Lungs: No increased WOB. Clear to ausculation. Extremities: No edema    A/P  79 yo female with history of UTI, GERD, dementia  admitted for sepsis secondary to perforated appendix. Atrial fibrillation:  Follow up EKG   Follow labs including TSH, troponin, BNP.  Follow up Echo   [id number]- 3.2% stroke risk per year  Cardiology consult, appreciate recommendation     Ro Price MD

## 2018-05-02 NOTE — PROGRESS NOTES
General Surgery Daily Progress Note    Patient: Gayle Angel MRN: [id number]  SSN: [id number]    YOB: 1934  Age: 80 y.o. Sex: female      Admit Date: 4/29/2018    Subjective:   Pain improved, tolerating clears. No BM recorded. She does not recall passing flatus. Ryder removed this morning.      Current Facility-Administered Medications   Medication Dose Route Frequency    ketorolac (TORADOL) injection 15 mg  15 mg IntraVENous Q6H    acetaminophen (TYLENOL) tablet 650 mg  650 mg Oral Q6H    docusate sodium (COLACE) capsule 100 mg  100 mg Oral DAILY    heparin (porcine) injection 5,000 Units  5,000 Units SubCUTAneous Q8H    insulin lispro (HUMALOG) injection   SubCUTAneous QID WITH MEALS    pantoprazole (PROTONIX) 40 mg in sodium chloride 0.9% 10 mL injection  40 mg IntraVENous Q12H    glucose chewable tablet 16 g  4 Tab Oral PRN    dextrose (D50W) injection syrg 12.5-25 g  12.5-25 g IntraVENous PRN    glucagon (GLUCAGEN) injection 1 mg  1 mg IntraMUSCular PRN    sodium chloride (NS) flush 5-10 mL  5-10 mL IntraVENous PRN    cefepime (MAXIPIME) 2 g in 0.9% sodium chloride (MBP/ADV) 100 mL  2 g IntraVENous Q8H    sodium chloride (NS) flush 5-10 mL  5-10 mL IntraVENous Q8H    sodium chloride (NS) flush 5-10 mL  5-10 mL IntraVENous PRN    0.9% sodium chloride infusion  100 mL/hr IntraVENous CONTINUOUS    naloxone (NARCAN) injection 0.4 mg  0.4 mg IntraVENous PRN    ondansetron (ZOFRAN) injection 4 mg  4 mg IntraVENous Q6H PRN    metroNIDAZOLE (FLAGYL) IVPB premix 500 mg  500 mg IntraVENous Q12H    HYDROmorphone (DILAUDID) injection 0.5 mg  0.5 mg IntraVENous Q6H PRN        Objective:   05/02 0701 - 05/02 1900  In: -   Out: 75 [Urine:75]  04/30 1901 - 05/02 0700  In: 4198.3 [I.V.:4198.3]  Out: 7417 [Urine:2220; Drains:300]  Patient Vitals for the past 8 hrs:   BP Temp Pulse Resp SpO2   05/02/18 0749 148/70 98.3 °F (36.8 °C) 78 18 99 %   05/02/18 0343 130/66 97.4 °F (36.3 °C) 79 18 96 %       Physical Exam:  General: Alert, cooperative, confused  Lungs: Unlabored  Heart:  Regular rate and  rhythm  Abdomen: Soft, ATTP, distended. Incisions c/d/i. MARCUS drain SS. Extremities: Warm, moves all, no edema  Skin:  Warm and dry, no rash    Labs:   Recent Labs      05/02/18   0410   WBC  10.4   HGB  9.8*   HCT  30.7*   PLT  204     Recent Labs      05/02/18   0410   04/29/18   1718   NA  134*   < >  135*   K  3.7   < >  3.7   CL  107   < >  99   CO2  21   < >  25   GLU  104*   < >  187*   BUN  11   < >  7   CREA  0.65   < >  0.93   CA  8.0*   < >  9.5   MG  2.2   < >   --    PHOS   --    --   3.1   ALB  1.8*   < >  3.6   TBILI  0.6   < >  0.8   SGOT  14*   < >  19   ALT  11*   < >  24    < > = values in this interval not displayed. Assessment / Plan:   · POD#2 lap appendectomy for perforated appendicitis  · Abdominal exam appropriate  · Full liquid diet   · Continue ABX  · Strict I/O  · Scheduled Tylenol and low-dose Toradol.  PRN Radha  · PT/OT, encourage IS  · Bowel regimen, Relistor

## 2018-05-02 NOTE — PROGRESS NOTES
Patient up with therapy, MARCUS site bleeding upon ambulation, site dry and intact when laying in bed, called Naida Cooley to just keep watching it at this time since not bleeding continuously and ok to given heparin.

## 2018-05-02 NOTE — MED STUDENT NOTES
*ATTENTION:  This note has been created by a medical student for educational purposes only. Please do not refer to the content of this note for clinical decision-making, billing, or other purposes. Please see attending physicians note to obtain clinical information on this patient. *         [facility]   Daily Progress Note    Date: 5/2/2018    Assessment/Plan:   Deana Harman is a 80 y.o. female who is POD 2 from laparoscopic apendectomy for contained appendix perforation. She was admitted for acute abdominal pain and 4/4 SIRS concerning for sepsis with a past medical history of dementia, UTI, and GERD. Ruptured Appendix with Sepsis - POD 2 lap appendectomy, EBL 50 cc, purulent exudate was found on peritoneum, sigmoid was thickened with multiple diverticuli and no signs of perforation.  Sepsis most likely 2/2 perforated appendix, 4/4 SIRS on admission.  - Continue IVF  - Zofran prn, ibuprofen prn  - Continue dilaudid for pain control  - Surgery is following, will follow up with antibiotic recommendations  - Hgb 11.4--> 9.8  - H&H at noon today  - Continue Cefepime and metronidazole (Day 3)  - Continue incentive spirometry, wound care and routine post-op care  - PT/OT recommend SNF/Rehab vs. Home Health with 24/7 care    Hyponatremia - mild, 134 today  - continue IVF   - Continue to montior BMP daily    Urinary retention -  no medications at home  - Denise catheter in place  - Will remove denise this morning   - Bladder scan to monitor for retention  - If patient unable to void, intermittent straight cath    Dementia - not on any home medications  - Continue to monitor mental status  - Frequent reorientation, lights on, limit sedating medications  - Palliative care consulted, appreciate the recs  - Family meeting with  and Daughter at 1 pm 5/02  - Family would like to pursue [facility] for placement, [facility] is backup  - Case management is working on placement    GERD - not on any medications at home, well-controlled  - Continue to monitor  - GI ppx with Protonix      FEN/GI - NPO. NS at 100 mL/hr. Activity - with assisstance  DVT prophylaxis - SCDs  GI prophylaxis -  Protonix  Disposition - Plan to d/c to SNF. CM is working on placement. CODE STATUS:  FULL CODE    Patient discussed with Dr. Bernard Posada, [address]         CC: \"I feel better I think\"    Subjective  Patient is POD 2 from laparoscopic appendectomy and there were no acute events overnight. This morning patient reports she is feeling better and slept well overnight. She does not endorse any pain this morning. She is mildly confused, but pleasant. Denies fevers, chills, headaches, chest pain, shortness of breath, palpitations, nausea/vomting, and LE edema.        Inpatient Medications  Current Facility-Administered Medications   Medication Dose Route Frequency    ketorolac (TORADOL) injection 15 mg  15 mg IntraVENous Q6H    acetaminophen (TYLENOL) tablet 650 mg  650 mg Oral Q6H    docusate sodium (COLACE) capsule 100 mg  100 mg Oral DAILY    heparin (porcine) injection 5,000 Units  5,000 Units SubCUTAneous Q8H    insulin lispro (HUMALOG) injection   SubCUTAneous QID WITH MEALS    pantoprazole (PROTONIX) 40 mg in sodium chloride 0.9% 10 mL injection  40 mg IntraVENous Q12H    glucose chewable tablet 16 g  4 Tab Oral PRN    dextrose (D50W) injection syrg 12.5-25 g  12.5-25 g IntraVENous PRN    glucagon (GLUCAGEN) injection 1 mg  1 mg IntraMUSCular PRN    sodium chloride (NS) flush 5-10 mL  5-10 mL IntraVENous PRN    cefepime (MAXIPIME) 2 g in 0.9% sodium chloride (MBP/ADV) 100 mL  2 g IntraVENous Q8H    sodium chloride (NS) flush 5-10 mL  5-10 mL IntraVENous Q8H    sodium chloride (NS) flush 5-10 mL  5-10 mL IntraVENous PRN    0.9% sodium chloride infusion  100 mL/hr IntraVENous CONTINUOUS    naloxone (NARCAN) injection 0.4 mg  0.4 mg IntraVENous PRN    ondansetron (ZOFRAN) injection 4 mg  4 mg IntraVENous Q6H PRN    metroNIDAZOLE (FLAGYL) IVPB premix 500 mg  500 mg IntraVENous Q12H    HYDROmorphone (DILAUDID) injection 0.5 mg  0.5 mg IntraVENous Q6H PRN         Allergies  Allergies   Allergen Reactions    Macrodantin [Nitrofurantoin Macrocrystalline] Rash    Pcn [Penicillins] Rash    Sulfa (Sulfonamide Antibiotics) Rash         Objective  Vitals:  Patient Vitals for the past 8 hrs:   Temp Pulse Resp BP SpO2   05/02/18 0343 97.4 °F (36.3 °C) 79 18 130/66 96 %   05/02/18 0000 97.6 °F (36.4 °C) 74 18 117/62 97 %         I/O:    Intake/Output Summary (Last 24 hours) at 05/02/18 0700  Last data filed at 05/02/18 0344   Gross per 24 hour   Intake              400 ml   Output             1015 ml   Net             -615 ml     Last shift:    05/01 1901 - 05/02 0700  In: -   Out: 241 [Urine:725; Drains:10]  Last 3 shifts:    04/30 0701 - 05/01 1900  In: 7814 [I.V.:5450]  Out: 2110 [Urine:1820; Drains:290]    Physical Exam:  General: Appears comfortably resing in bed. Alert. Oriented to self and location. Not oriented to date and recent events. Cooperative. Head: Normocephalic. Atraumatic. Respiratory: Clear to auscultation bilaterally. No crackles, wheezes, or rhonchi. Cardiovascular: RRR. Normal S1,S2. No m/r/g. Pulses 2+ throughout. GI: Normoactive bowel sounds in all quadrants. Appropriately tender abdomen, most notable in suprapubic and RLQ. Soft. No guarding. Mild suprapubic distention. Extremities: No edema. No tenderness.  Wearing SCDs     Laboratory Data  Recent Results (from the past 12 hour(s))   GLUCOSE, POC    Collection Time: 05/01/18  9:49 PM   Result Value Ref Range    Glucose (POC) 118 (H) 65 - 100 mg/dL    Performed by [facility]    METABOLIC PANEL, COMPREHENSIVE    Collection Time: 05/02/18  4:10 AM   Result Value Ref Range    Sodium 134 (L) 136 - 145 mmol/L    Potassium 3.7 3.5 - 5.1 mmol/L    Chloride 107 97 - 108 mmol/L    CO2 21 21 - 32 mmol/L    Anion gap 6 5 - 15 mmol/L    Glucose 104 (H) 65 - 100 mg/dL    BUN 11 6 - 20 MG/DL    Creatinine 0.65 0.55 - 1.02 MG/DL    BUN/Creatinine ratio 17 12 - 20      GFR est AA >60 >60 ml/min/1.73m2    GFR est non-AA >60 >60 ml/min/1.73m2    Calcium 8.0 (L) 8.5 - 10.1 MG/DL    Bilirubin, total 0.6 0.2 - 1.0 MG/DL    ALT (SGPT) 11 (L) 12 - 78 U/L    AST (SGOT) 14 (L) 15 - 37 U/L    Alk. phosphatase 34 (L) 45 - 117 U/L    Protein, total 5.0 (L) 6.4 - 8.2 g/dL    Albumin 1.8 (L) 3.5 - 5.0 g/dL    Globulin 3.2 2.0 - 4.0 g/dL    A-G Ratio 0.6 (L) 1.1 - 2.2     CBC WITH AUTOMATED DIFF    Collection Time: 05/02/18  4:10 AM   Result Value Ref Range    WBC 10.4 3.6 - 11.0 K/uL    RBC 3.39 (L) 3.80 - 5.20 M/uL    HGB 9.8 (L) 11.5 - 16.0 g/dL    HCT 30.7 (L) 35.0 - 47.0 %    MCV 90.6 80.0 - 99.0 FL    MCH 28.9 26.0 - 34.0 PG    MCHC 31.9 30.0 - 36.5 g/dL    RDW 13.2 11.5 - 14.5 %    PLATELET 157 579 - 637 K/uL    MPV 10.6 8.9 - 12.9 FL    NRBC 0.0 0  WBC    ABSOLUTE NRBC 0.00 0.00 - 0.01 K/uL    NEUTROPHILS 81 (H) 32 - 75 %    LYMPHOCYTES 11 (L) 12 - 49 %    MONOCYTES 6 5 - 13 %    EOSINOPHILS 0 0 - 7 %    BASOPHILS 0 0 - 1 %    IMMATURE GRANULOCYTES 1 (H) 0.0 - 0.5 %    ABS. NEUTROPHILS 8.5 (H) 1.8 - 8.0 K/UL    ABS. LYMPHOCYTES 1.2 0.8 - 3.5 K/UL    ABS. MONOCYTES 0.6 0.0 - 1.0 K/UL    ABS. EOSINOPHILS 0.0 0.0 - 0.4 K/UL    ABS. BASOPHILS 0.0 0.0 - 0.1 K/UL    ABS. IMM.  GRANS. 0.1 (H) 0.00 - 0.04 K/UL    DF AUTOMATED     MAGNESIUM    Collection Time: 05/02/18  4:10 AM   Result Value Ref Range    Magnesium 2.2 1.6 - 2.4 mg/dL   Blood Cx: NGTD for 2 days, (x2)  UCx: NGTD x 1 day  MRSA: negative    Imaging  CT abdomen/pelvis: multiple sigmoid diverticula, moderate ascites, 7 cm thickened segment of sigmoid colon, Right middle lobe 5 mm micronodule warrants future follow up  CXR: no acute process  Abd U/S: no acute process        Hospital Problems:  Hospital Problems  Date Reviewed: 4/11/2018          Codes Class Noted POA    Prediabetes ICD-10-CM: R73.03  ICD-9-CM: 790.29  4/30/2018 Yes        SIRS (systemic inflammatory response syndrome) (HCC) ICD-10-CM: R65.10  ICD-9-CM: 995.90  4/30/2018 Yes        * (Principal)Abdominal pain ICD-10-CM: R10.9  ICD-9-CM: 789.00  4/29/2018 Yes        Dementia without behavioral disturbance ICD-10-CM: F03.90  ICD-9-CM: 294.20  5/13/2015 Yes        GERD (gastroesophageal reflux disease) ICD-10-CM: K21.9  ICD-9-CM: 530.81  4/23/2010 Yes

## 2018-05-02 NOTE — PROGRESS NOTES
Palliative Medicine  [place]: [phone])  [facility]: [phone])      Resuscitation Status: DNR   Durable DNR addressed? [x] Yes   [] No    [] Not Applicable *Completed on this date    Advance Care Planning 5/2/2018   Patient's Healthcare Decision Maker is: Verbal statement (Legal Next of Kin remains as decision maker)   Primary Decision Maker Name Alethea Urena   Primary Decision Maker Phone Number [phone]   Primary Decision Maker Relationship to Patient Spouse   Confirm Advance Directive Yes, not on file   Patient Would Like to Complete Advance Directive Unable   Does the patient have other document types Do Not Resuscitate           Patient / Family Encounter Documentation    Participants (names):  Liya Bush, son Kelle Santiago (NP Meseret Horowitz)    Narrative:  Pt was up in recliner, alert and talkative, has significant memory impairment. Met with  and son outside of room to offer support and discuss goals of care. Pt and  have been  61 years, have one son Kady Majano) and one dtr (Sincere Carranza). Pt worked for an insurance company. Family reports pt has shown signs of memory impairment for the past year, is able to perform most ADLs independently though does not prepare meals, does not drive. 's hope is that pt will be able to return home after building strength at SNF; however, family anticipates that pt will ultimately need long term care, expressed intent to apply for Medicaid in the future.  shared of his own father's experience in long term care many years ago, indicated sense of peaceful inevitability that pt will follow similar path.  and son report pt has AMD in place, both agreed that pt would not want attempts at resuscitation in the event of cardiac/respiratory arrest.  DDNR was reviewed and completed on this date. NP discussed hospice as an option in future as disease progresses.     Psychosocial Issues Identified: Caregiver stress:  does have support from son and dtr, though son stated he travels frequently for work.  practices self care by going bowling once a week. Goals of Care / Plan: Transfer to SNF when stable for discharge. 's hope is for pt to return home when stronger, expressed intent to care for pt as long as he is able though anticipates ultimate need for long term care. Code status was clarified, DDNR reviewed and completed on this date. SW discussed plan of care with Care Manager. Will follow for support. Chaplains have been visiting, as well. Thank you for including Palliative Medicine in the care of Ms. Genny Roach.     Maribell Rodriguez LCSW, [facility]  [phone])

## 2018-05-02 NOTE — PROGRESS NOTES
Problem: Falls - Risk of  Goal: *Absence of Falls  Document Abhi Fall Risk and appropriate interventions in the flowsheet. Outcome: Progressing Towards Goal  Fall Risk Interventions:  Mobility Interventions: Bed/chair exit alarm    Mentation Interventions: Bed/chair exit alarm    Medication Interventions: Bed/chair exit alarm, Patient to call before getting OOB, Teach patient to arise slowly    Elimination Interventions: Call light in reach, Toileting schedule/hourly rounds             Problem: Pressure Injury - Risk of  Goal: *Prevention of pressure injury  Document Dimitris Scale and appropriate interventions in the flowsheet. Outcome: Progressing Towards Goal  Pressure Injury Interventions:  Sensory Interventions: Assess changes in LOC, Turn and reposition approx.  every two hours (pillows and wedges if needed)    Moisture Interventions: Absorbent underpads    Activity Interventions: Pressure redistribution bed/mattress(bed type)    Mobility Interventions: Pressure redistribution bed/mattress (bed type)    Nutrition Interventions: Document food/fluid/supplement intake    Friction and Shear Interventions: Apply protective barrier, creams and emollients, Foam dressings/transparent film/skin sealants, Lift sheet, Lift team/patient mobility team, Minimize layers, Transferring/repositioning devices

## 2018-05-02 NOTE — PROGRESS NOTES
Received call from tele, patient going in and out of afib, family practice up here on the unit at the time, EKG ordered and results given to family practice physician on the floor.

## 2018-05-02 NOTE — ACP (ADVANCE CARE PLANNING)
and son report pt has AMD in place, copy currently not on file. In absence of valid AMD,  is legal NOK and would serve as surrogate decision maker on pt's behalf.  and son were in agreement that pt would not want attempts at resuscitation in the event of cardiac/respiratory arrest.  DDNR was reviewed and completed on this date, original returned to , copy placed on chart to be scanned into medical record, additional copy placed on chart to be sent to SNF with pt.

## 2018-05-02 NOTE — PROGRESS NOTES
Palliative Medicine Consult  Carlos Enrique: [phone] ([phone])    Patient Name: Shade Juares  YOB: 1934    Date of Initial Consult: 04/30/2018  Reason for Consult: Care decisions  Requesting Provider: Jose R Carranza MD   Primary Care Physician: Sweta Yoo MD     SUMMARY:   Shade Juares is a 80 y.o. with a past history of dementia, GERD, UTI, and anxiety who was admitted on 4/29/2018 from home with a diagnosis of abdominal pain. Patient presented to the ED with complaints of generalized weakness, abdominal pain, vomiting, and decreased appetite for the last few days. Work-up revealed elevated lactic acid and leukocytosis. Patient admitted to ICU and started on IVF and broad spectrum antibiotics. Blood and urine cultures pending. Initial CT abdomen/pelvis was read as diverticulosis but has been reviewed by radiologist again today who raised concern for perforated appendicitis. Plan for laparoscopic appendectomy later today (4/30). Current medical issues leading to Palliative Medicine involvement include: care decisions due to risk for decline. Interim History:  05/01--> Patient underwent lap appendectomy last night. No acute events post-op. Transferred out of ICU this am.     05/02--> POD #2 from lap appendectomy for perforated appendix. Without complaints. PALLIATIVE DIAGNOSES:   1. Impaired memory  2. Abdominal Pain  3. Physical debility  4. Goals of care  5. DNR discussion  6. Concern for perforated appendix- for OR today  7. Baseline Dementia-- FAST scale 6C       PLAN:   1. Met with patient, son Baldo Jasso and  Nicholas Melendez with [facility]. 2. Patient continues to be pleasantly confusion-- oriented to person only. 3. Symptom Management--> Patient currently denies any pain and no s/s of discomfort. Has not received any opioids in the last 24 hours and currently pain controlled on scheduled tylenol and scheduled Toradol added yesterday.  Continue to optimize symptom management. 4. Goals of care--> (05/01) Discussed current hospitalization and chronic comorbidities with patient's . Sathish Arrington described patient's level of functioning prior to admission- was able to ambulate but, due to memory issues, requires multiple cues and redirection in order to complete basic tasks/ADLs. Is unable to prepare meals or perform any household duties. Sathish Arrington endorses that patient's memory impairment started several years ago and has been progressive to the point that she requires 24 hour care at home and cannot be left alone. Discussed PT/OT recommendations and possibility of patient benefiting from short stay at SNF for skilled therapies and  wishes to pursue this. Explained that this would not be long term and questioned whether he is still able to support the patient at home moving forward, to which he replied \"I don't think so\". Discussed options of LTC placement versus hiring private paid caregivers to assist in providing 24/7 care for the patient, and Sathish Arrington is considering all of these options going forward. Given FAST scale of 6C, patient currently doesn't qualify for hospice services under Medicare Guidelines for the diagnosis of dementia, but given progressive nature of disease, may be able to utilize the support services in the future. Will follow-up tomorrow 5/2 at 1pm with  and daughter to continue to address goals of care. (05/02) Discussed that no acute events occurred overnight and that PT/OT where recommending continued rehabilitation at discharge to SNF and eventually return home. Family described the events leading up to current hospitalization and prior level of functioning. Memory issues started approximately a year ago and have progressively worsened to where she has no short term memory. Patient able to ambulate without assistance, dress herself, toilets independently, and feeds herself.  Son and  realize the progressive nature of dementia and are aware of the fact that in the future her care needs may increase to the point where Triny Leiva can not care for her alone. Addressed how the family can best approach her future long term needs and Triny Leiva is planning on applying for medicaid, considering employing care aids in the home, as well as possibility of pursing LTC in a facility. Introduced the possibility of utilizing hospice services when her dementia becomes advanced as an added layer of support for patient and family and what those services would include (team approach of MD/RN/care aids/SW/, DME, 24/7 access to provider, etc). Also explained that these services are paid for at 100% and advised them that  they could discuss suitability for hospice with PCP or contact hospice agency for information session and screening for hospice suitability if/when patient's level of function were to decline and dementia progresses. 5. DNR discussion--> (05/01) Discussed code status at length with Triny Leiva, including patient's debilitation and the unlikelihood of meaningful recovery in the event of cardiopulmonary arrest. Addressed what patient would want done in the event of cardiopulmonary arrest and what resuscitation would entail. Triny Leiva states that he and the patient never discussed what she would want done in this situation and elects that patient remain FULL CODE, given this uncertainty. Would like to think about this further and is willing to discuss it further tomorrow. (05/02) Addressed code status at length with son and , and  has decided that in the event of cardiopulmonary arrest he would elect for patient to be a DNR, given that it would be unlikely to contribute to meaningful recovery. DDNR completed and original given to Triny Leiva and copy put on chart to scan into EMR. 6. ACP--> (05/01) No AMD on file and patient unable to make her own decisions due to dementia;   confirms that she has not completed an AMD. Therefore, default surrogate decision maker is legal next of kin which is her  Meredith Trujillo ([phone]). ACP updated and will confirm with family if patient has completed ACP documents. (05/02) Son Lianna Higuera reports that he knows that patient and Dianelys Aleman completed a AMD/living will several years back and that it appointed Dianelys Aleman as primary and the two children Tiffany Fletcher and Elvira Connor as secondary agents. Will ask family to bring document in for EMR. Without AMD on file, default surrogate decision maker is legal next of kin her  Meredith Trujillo ([phone]). 7. Initial consult note routed to primary continuity provider  8. Communicated plan of care with: Palliative IDT,  Johnna Craig, Family Medicine Team       GOALS OF CARE / TREATMENT PREFERENCES:     GOALS OF CARE:  Patient/Health Care Proxy Stated Goals: Rehabilitation      TREATMENT PREFERENCES:   Code Status: DNR    Advance Care Planning:  Advance Care Planning 5/1/2018   Patient's Healthcare Decision Maker is: Legal Next of David 69   Primary Decision Maker Name Meredith Trujillo   Primary Decision Maker Phone Number [phone]   Primary Decision Maker Relationship to Patient Spouse   Confirm Advance Directive None   Does the patient have other document types -       Medical Interventions: Limited additional interventions (DNR)   Other Instructions:   Artificially Administered Nutrition:  (not addressed)     Other:    As far as possible, the palliative care team has discussed with patient / health care proxy about goals of care / treatment preferences for patient. HISTORY:     History obtained from: chart, bedside nurse    CHIEF COMPLAINT: generalized weakness, abdominal pain, vomiting    HPI/SUBJECTIVE:    The patient is:   [] Verbal and participatory  [x] Non-participatory due to: baseline dementia      Patient presented to the ED with complaints of generalized weakness, abdominal pain, vomiting, and decreased appetite for the last few days. Work-up revealed elevated lactic acid and leukocytosis. Patient admitted to ICU and started on IVF and broad spectrum antibiotics. Blood and urine cultures pending. Initial CT abdomen/pelvis was read as diverticulosis but has been reviewed by radiologist again today who raised concern for perforated appendicitis. Plan for laparoscopic appendectomy 4/30. Interim History:  05/1--> Patient underwent lap appendectomy last night. No acute events post-op. Transferred out of ICU this am.     05/02--> POD #2 from lap appendectomy for perforated appendix. Without complaints.      Clinical Pain Assessment (nonverbal scale for severity on nonverbal patients):   Clinical Pain Assessment  Severity: 0  Location: abdomen  Character: generalized  Duration: several days  Effect: limited movement  Factors: movement, palpation  Frequency: constant     Activity (Movement): Lying quietly, normal position    Duration: for how long has pt been experiencing pain (e.g., 2 days, 1 month, years)  Frequency: how often pain is an issue (e.g., several times per day, once every few days, constant)     FUNCTIONAL ASSESSMENT:     Palliative Performance Scale (PPS):  PPS: 60       PSYCHOSOCIAL/SPIRITUAL SCREENING:     Palliative IDT has assessed this patient for cultural preferences / practices and a referral made as appropriate to needs (Cultural Services, Patient Advocacy, Ethics, etc.)    Advance Care Planning:  Advance Care Planning 5/1/2018   Patient's Healthcare Decision Maker is: Legal Next Osvaldo Keith   Primary Decision Maker Name Natalya Luu   Primary Decision Maker Phone Number [phone]   Primary Decision Maker Relationship to Patient Spouse   Confirm Advance Directive None   Does the patient have other document types -       Any spiritual / Jehovah's witness concerns:  [] Yes /  [x] No    Caregiver Burnout:  [] Yes /  [x] No /  [] No Caregiver Present      Anticipatory grief assessment:   [x] Normal  / [] Maladaptive       ESAS Anxiety: Anxiety: 0    ESAS Depression:          REVIEW OF SYSTEMS:     Positive and pertinent negative findings in ROS are noted above in HPI. The following systems were [] reviewed / [x] unable to be reviewed as noted in HPI  Other findings are noted below. Systems: constitutional, ears/nose/mouth/throat, respiratory, gastrointestinal, genitourinary, musculoskeletal, integumentary, neurologic, psychiatric, endocrine. Positive findings noted below. Modified ESAS Completed by: provider     Drowsiness: 0     Pain: 0   Anxiety: 0       Dyspnea: 0                    PHYSICAL EXAM:     From RN flowsheet:  Wt Readings from Last 3 Encounters:   05/01/18 130 lb 8.2 oz (59.2 kg)   04/05/18 119 lb (54 kg)   07/25/17 128 lb (58.1 kg)     Blood pressure 127/69, pulse 98, temperature 98.3 °F (36.8 °C), resp. rate 18, height 5' 3\" (1.6 m), weight 130 lb 8.2 oz (59.2 kg), last menstrual period 01/31/1995, SpO2 96 %.     Pain Scale 1: Visual  Pain Intensity 1: 0  Pain Onset 1: Post op  Pain Location 1: Abdomen  Pain Orientation 1: Right, Lower  Pain Description 1: Sore  Pain Intervention(s) 1: Medication (see MAR)  Last bowel movement, if known:     Constitutional: NAD, sitting up in chair, recognizes family at bedside  Eyes: pupils equal, anicteric  ENMT: no nasal discharge, moist mucous membranes  Cardiovascular: regular rhythm, distal pulses intact, no LARA  Respiratory: breathing not labored, symmetric  Gastrointestinal: lap incisions without redness, +bowel sounds  Musculoskeletal: no deformity, no tenderness to palpation  Skin: warm, dry  Neurologic: following commands, moving all extremities, +memory impairment requiring reorientation  Psychiatric: full affect, no hallucinations  Other:       HISTORY:     Principal Problem:    Abdominal pain (4/29/2018)    Active Problems:    GERD (gastroesophageal reflux disease) (4/23/2010)      Dementia without behavioral disturbance (5/13/2015)      Prediabetes (4/30/2018)      SIRS (systemic inflammatory response syndrome) ([facility] [address] (4/30/2018)      Past Medical History:   Diagnosis Date    Alzheimer disease     Anxiety 4/23/2010    GERD (gastroesophageal reflux disease) 4/23/2010    Mild dementia     UTI (lower urinary tract infection) 4/23/2010      Past Surgical History:   Procedure Laterality Date    ENDOSCOPY, COLON, DIAGNOSTIC      HX CATARACT REMOVAL  5/2011    left    HX UROLOGICAL      bladder uplift      Family History   Problem Relation Age of Onset    Cancer Father       History reviewed, no pertinent family history.   Social History   Substance Use Topics    Smoking status: Never Smoker    Smokeless tobacco: Never Used    Alcohol use No     Allergies   Allergen Reactions    Macrodantin [Nitrofurantoin Macrocrystalline] Rash    Pcn [Penicillins] Rash    Sulfa (Sulfonamide Antibiotics) Rash      Current Facility-Administered Medications   Medication Dose Route Frequency    methylnaltrexone (RELISTOR) injection 8 mg  8 mg SubCUTAneous DAILY    bisacodyl (DULCOLAX) suppository 10 mg  10 mg Rectal DAILY PRN    dextrose 5% and 0.9% NaCl infusion  75 mL/hr IntraVENous CONTINUOUS    oxyCODONE IR (ROXICODONE) tablet 5 mg  5 mg Oral Q4H PRN    dilTIAZem (CARDIZEM) IR tablet 30 mg  30 mg Oral AC&HS    ketorolac (TORADOL) injection 15 mg  15 mg IntraVENous Q6H    acetaminophen (TYLENOL) tablet 650 mg  650 mg Oral Q6H    docusate sodium (COLACE) capsule 100 mg  100 mg Oral DAILY    heparin (porcine) injection 5,000 Units  5,000 Units SubCUTAneous Q8H    insulin lispro (HUMALOG) injection   SubCUTAneous QID WITH MEALS    pantoprazole (PROTONIX) 40 mg in sodium chloride 0.9% 10 mL injection  40 mg IntraVENous Q12H    glucose chewable tablet 16 g  4 Tab Oral PRN    dextrose (D50W) injection syrg 12.5-25 g  12.5-25 g IntraVENous PRN    glucagon (GLUCAGEN) injection 1 mg  1 mg IntraMUSCular PRN    sodium chloride (NS) flush 5-10 mL  5-10 mL IntraVENous PRN    cefepime (MAXIPIME) 2 g in 0.9% sodium chloride (MBP/ADV) 100 mL  2 g IntraVENous Q8H    sodium chloride (NS) flush 5-10 mL  5-10 mL IntraVENous Q8H    sodium chloride (NS) flush 5-10 mL  5-10 mL IntraVENous PRN    naloxone (NARCAN) injection 0.4 mg  0.4 mg IntraVENous PRN    ondansetron (ZOFRAN) injection 4 mg  4 mg IntraVENous Q6H PRN    metroNIDAZOLE (FLAGYL) IVPB premix 500 mg  500 mg IntraVENous Q12H    HYDROmorphone (DILAUDID) injection 0.5 mg  0.5 mg IntraVENous Q6H PRN          LAB AND IMAGING FINDINGS:     Lab Results   Component Value Date/Time    WBC 10.4 05/02/2018 04:10 AM    HGB 10.5 (L) 05/02/2018 12:12 PM    PLATELET 878 48/87/2380 04:10 AM     Lab Results   Component Value Date/Time    Sodium 134 (L) 05/02/2018 04:10 AM    Potassium 3.7 05/02/2018 04:10 AM    Chloride 107 05/02/2018 04:10 AM    CO2 21 05/02/2018 04:10 AM    BUN 11 05/02/2018 04:10 AM    Creatinine 0.65 05/02/2018 04:10 AM    Calcium 8.0 (L) 05/02/2018 04:10 AM    Magnesium 2.2 05/02/2018 04:10 AM    Phosphorus 3.1 04/29/2018 05:18 PM      Lab Results   Component Value Date/Time    AST (SGOT) 14 (L) 05/02/2018 04:10 AM    Alk. phosphatase 34 (L) 05/02/2018 04:10 AM    Protein, total 5.0 (L) 05/02/2018 04:10 AM    Albumin 1.8 (L) 05/02/2018 04:10 AM    Globulin 3.2 05/02/2018 04:10 AM     Lab Results   Component Value Date/Time    INR 1.1 06/30/2010 12:00 PM    Prothrombin time 10.9 06/30/2010 12:00 PM      No results found for: IRON, FE, TIBC, IBCT, PSAT, FERR   No results found for: PH, PCO2, PO2  No components found for: GLPOC   No results found for: CPK, CKMB             Total time: 35 min  Counseling / coordination time, spent as noted above: 30 min  > 50% counseling / coordination?: yes    Prolonged service was provided for  []30 min   []75 min in face to face time in the presence of the patient, spent as noted above. Time Start:   Time End:   Note: this can only be billed with 28679 (initial) or 05154 (follow up).   If multiple start / stop times, list each separately.

## 2018-05-03 NOTE — PROGRESS NOTES
Patient being discharged to [facility], called and given report to Najma Cruz in [employer], questions noted, patient in no apparent distress and sent via ambulance.

## 2018-05-03 NOTE — PROGRESS NOTES
Spoke with daughter in pts room and along with pts  via daughter's cell phone regarding the discharge plan to [facility] today. Family is aware she had AFIB and is now treated with a blood thinner called Eliquis. Pt to follow up with surgery in 2 weeks. Family agrees with plan.  arranged for a 4:00pm  to [facility].      Ayesha Mckoy MD

## 2018-05-03 NOTE — PROGRESS NOTES
Patient bladder scanned for 327ml, assisted patient twice to [facility] for assistance, unable to go, patient straight cath earlier at 0200 for 800ml, order by Anders Bee to straight cath patient again at this time.

## 2018-05-03 NOTE — PROGRESS NOTES
Palliative Medicine Consult  [place]: [phone] ([phone])    Patient Name: Kirsten Cai  YOB: 1934    Date of Initial Consult: 04/30/2018  Reason for Consult: Care decisions  Requesting Provider: Veena Rios MD   Primary Care Physician: Wilber Toribio MD     SUMMARY:   Kirsten Cai is a 80 y.o. with a past history of dementia, GERD, UTI, and anxiety who was admitted on 4/29/2018 from home with a diagnosis of abdominal pain. Patient presented to the ED with complaints of generalized weakness, abdominal pain, vomiting, and decreased appetite for the last few days. Work-up revealed elevated lactic acid and leukocytosis. Patient admitted to ICU and started on IVF and broad spectrum antibiotics. Blood and urine cultures pending. Initial CT abdomen/pelvis was read as diverticulosis but has been reviewed by radiologist again today who raised concern for perforated appendicitis. Plan for laparoscopic appendectomy later today (4/30). Current medical issues leading to Palliative Medicine involvement include: care decisions due to risk for decline. Interim History:  05/01--> Patient underwent lap appendectomy last night. No acute events post-op. Transferred out of ICU this am.     05/02--> POD #2 from lap appendectomy for perforated appendix. Without complaints. 05/03--> POD #3. Having complaints of abdominal pain around MACRUS. Episode of afib with RVR overnight which converted to SR. PALLIATIVE DIAGNOSES:   1. Impaired memory  2. Abdominal Pain  3. Physical debility  4. Goals of care  5. DNR  6. Concern for perforated appendix- for OR today  7. Baseline Dementia-- FAST scale 6C       PLAN:   1. Met with patient in follow-up. No family at bedside. 2. Patient continues to be pleasantly confusion-- oriented to person only. 3. Symptom Management--> Patient reporting burning around the MARCUS drain. Can't verbalize where it is or level. Appears uncomfortable.  Spoke with nurse and Gustavo Douglas scheduled to be removed today. Has not received any opioids in the last 24 hours and I asked bedside nurse to medicate her with prn pain medication. Continue scheduled tylenol and scheduled Toradol. Continue to optimize symptom management. 4. Goals of care--> (05/01) Discussed current hospitalization and chronic comorbidities with patient's . Soniya Lopez described patient's level of functioning prior to admission- was able to ambulate but, due to memory issues, requires multiple cues and redirection in order to complete basic tasks/ADLs. Is unable to prepare meals or perform any household duties. Soniya Lopez endorses that patient's memory impairment started several years ago and has been progressive to the point that she requires 24 hour care at home and cannot be left alone. Discussed PT/OT recommendations and possibility of patient benefiting from short stay at SNF for skilled therapies and  wishes to pursue this. Explained that this would not be long term and questioned whether he is still able to support the patient at home moving forward, to which he replied \"I don't think so\". Discussed options of LTC placement versus hiring private paid caregivers to assist in providing 24/7 care for the patient, and Soniya Lopez is considering all of these options going forward. Given FAST scale of 6C, patient currently doesn't qualify for hospice services under Medicare Guidelines for the diagnosis of dementia, but given progressive nature of disease, may be able to utilize the support services in the future. Will follow-up tomorrow 5/2 at 1pm with  and daughter to continue to address goals of care. (05/02) Discussed that no acute events occurred overnight and that PT/OT where recommending continued rehabilitation at discharge to SNF and eventually return home. Family described the events leading up to current hospitalization and prior level of functioning.  Memory issues started approximately a year ago and have progressively worsened to where she has no short term memory. Patient able to ambulate without assistance, dress herself, toilets independently, and feeds herself. Son and  realize the progressive nature of dementia and are aware of the fact that in the future her care needs may increase to the point where Soniya Lopez can not care for her alone. Addressed how the family can best approach her future long term needs and Soniya Lopez is planning on applying for medicaid, considering employing care aids in the home, as well as possibility of pursing LTC in a facility. Introduced the possibility of utilizing hospice services when her dementia becomes advanced as an added layer of support for patient and family and what those services would include (team approach of MD/RN/care aids/SW/, DME, 24/7 access to provider, etc). Also explained that these services are paid for at 100% and advised them that  they could discuss suitability for hospice with PCP or contact hospice agency for information session and screening for hospice suitability if/when patient's level of function were to decline and dementia progresses. (05/03) No family at bedside. Discussed with - plan for discharge today to [facility] for continued rehabilitation. 5. DNR discussion--> (05/01) Discussed code status at length with Soniya Lopez, including patient's debilitation and the unlikelihood of meaningful recovery in the event of cardiopulmonary arrest. Addressed what patient would want done in the event of cardiopulmonary arrest and what resuscitation would entail. Soniya Lopez states that he and the patient never discussed what she would want done in this situation and elects that patient remain FULL CODE, given this uncertainty. Would like to think about this further and is willing to discuss it further tomorrow.  (05/02) Addressed code status at length with son and , and  has decided that in the event of cardiopulmonary arrest he would elect for patient to be a DNR, given that it would be unlikely to contribute to meaningful recovery. DDNR completed and original given to Samuel Kinsey and copy put on chart to scan into EMR. 6. ACP--> (05/01) No AMD on file and patient unable to make her own decisions due to dementia;  confirms that she has not completed an AMD. Therefore, default surrogate decision maker is legal next of kin which is her  Natalya Luu ([phone]). ACP updated and will confirm with family if patient has completed ACP documents. (05/02) Son Shanique Fountain reports that he knows that patient and Samuel Kinsey completed a AMD/living will several years back and that it appointed Samuel Kinsey as primary and the two children Onesimo Quintanilla and Neli Palacios as secondary agents. Will ask family to bring document in for EMR. Without AMD on file, default surrogate decision maker is legal next of kin her  Natalya Luu ([phone]). 7. Initial consult note routed to primary continuity provider  8.  Communicated plan of care with: Palliative IDT,  [place], Family Medicine Team       GOALS OF CARE / TREATMENT PREFERENCES:     GOALS OF CARE:  Patient/Health Care Proxy Stated Goals: Rehabilitation      TREATMENT PREFERENCES:   Code Status: DNR    Advance Care Planning:  Advance Care Planning 5/2/2018   Patient's Healthcare Decision Maker is: Verbal statement (Legal Next of Kin remains as decision maker)   Primary Decision Maker Name Natalya Luu   Primary Decision Maker Phone Number [phone]   Primary Decision Maker Relationship to Patient Spouse   Confirm Advance Directive Yes, not on file   Patient Would Like to Complete Advance Directive Unable   Does the patient have other document types Do Not Resuscitate       Medical Interventions: Limited additional interventions   Other Instructions:   Artificially Administered Nutrition:  (not addressed)     Other:    As far as possible, the palliative care team has discussed with patient / health care proxy about goals of care / treatment preferences for patient. HISTORY:     History obtained from: chart, bedside nurse    CHIEF COMPLAINT: generalized weakness, abdominal pain, vomiting    HPI/SUBJECTIVE:    The patient is:   [] Verbal and participatory  [x] Non-participatory due to: baseline dementia      Patient presented to the ED with complaints of generalized weakness, abdominal pain, vomiting, and decreased appetite for the last few days. Work-up revealed elevated lactic acid and leukocytosis. Patient admitted to ICU and started on IVF and broad spectrum antibiotics. Blood and urine cultures pending. Initial CT abdomen/pelvis was read as diverticulosis but has been reviewed by radiologist again today who raised concern for perforated appendicitis. Plan for laparoscopic appendectomy 4/30. Interim History:  05/1--> Patient underwent lap appendectomy last night. No acute events post-op. Transferred out of ICU this am.     05/02--> POD #2 from lap appendectomy for perforated appendix. Without complaints. 05/03--> POD #3. Having complaints of abdominal pain around MARCUS. Episode of afib with RVR overnight which converted to SR.         Clinical Pain Assessment (nonverbal scale for severity on nonverbal patients):   Clinical Pain Assessment  Severity: 3  Location: abdomen  Character: generalized  Duration: several days  Effect: limited movement  Factors: movement, palpation  Frequency: constant     Activity (Movement): Seeking attention through movement or slow, cautious movement    Duration: for how long has pt been experiencing pain (e.g., 2 days, 1 month, years)  Frequency: how often pain is an issue (e.g., several times per day, once every few days, constant)     FUNCTIONAL ASSESSMENT:     Palliative Performance Scale (PPS):  PPS: 60       PSYCHOSOCIAL/SPIRITUAL SCREENING:     Palliative IDT has assessed this patient for cultural preferences / practices and a referral made as appropriate to needs (Cultural Services, Patient Advocacy, Ethics, etc.)    Advance Care Planning:  Advance Care Planning 5/2/2018   Patient's Healthcare Decision Maker is: Verbal statement (Legal Next of Kin remains as decision maker)   Primary Decision Maker Name Itzel Mahajan   Primary Decision Maker Phone Number [phone]   Primary Decision Maker Relationship to Patient Spouse   Confirm Advance Directive Yes, not on file   Patient Would Like to Complete Advance Directive Unable   Does the patient have other document types Do Not Resuscitate       Any spiritual / Mandaen concerns:  [] Yes /  [x] No    Caregiver Burnout:  [] Yes /  [] No /  [x] No Caregiver Present      Anticipatory grief assessment:   [x] Normal  / [] Maladaptive       ESAS Anxiety: Anxiety: 0    ESAS Depression:          REVIEW OF SYSTEMS:     Positive and pertinent negative findings in ROS are noted above in HPI. The following systems were [] reviewed / [x] unable to be reviewed as noted in HPI  Other findings are noted below. Systems: constitutional, ears/nose/mouth/throat, respiratory, gastrointestinal, genitourinary, musculoskeletal, integumentary, neurologic, psychiatric, endocrine. Positive findings noted below. Modified ESAS Completed by: provider   Fatigue: 0 Drowsiness: 0     Pain: 3   Anxiety: 0       Dyspnea: 0                    PHYSICAL EXAM:     From RN flowsheet:  Wt Readings from Last 3 Encounters:   05/01/18 130 lb 8.2 oz (59.2 kg)   04/05/18 119 lb (54 kg)   07/25/17 128 lb (58.1 kg)     Blood pressure 130/76, pulse 78, temperature 98.2 °F (36.8 °C), resp. rate 18, height 5' 3\" (1.6 m), weight 130 lb 8.2 oz (59.2 kg), last menstrual period 01/31/1995, SpO2 97 %.     Pain Scale 1: Visual  Pain Intensity 1: 0  Pain Onset 1: Post op  Pain Location 1: Abdomen, Arm  Pain Orientation 1: Right, Lower  Pain Description 1: Sore  Pain Intervention(s) 1: Medication (see MAR)  Last bowel movement, if known:     Constitutional: NAD, resting in bed, uncomfortable appearing  Eyes: pupils equal, anicteric  ENMT: no nasal discharge, moist mucous membranes  Cardiovascular: regular rhythm, distal pulses intact, no LARA  Respiratory: breathing not labored, symmetric  Gastrointestinal: lap incisions without redness, +bowel sounds  Musculoskeletal: no deformity, no tenderness to palpation  Skin: warm, dry  Neurologic: following commands, moving all extremities, +memory impairment requiring reorientation  Psychiatric: full affect, no hallucinations  Other:       HISTORY:     Principal Problem:    Abdominal pain (4/29/2018)    Active Problems:    GERD (gastroesophageal reflux disease) (4/23/2010)      Dementia without behavioral disturbance (5/13/2015)      Prediabetes (4/30/2018)      SIRS (systemic inflammatory response syndrome) (HCC) (4/30/2018)      Past Medical History:   Diagnosis Date    Alzheimer disease     Anxiety 4/23/2010    GERD (gastroesophageal reflux disease) 4/23/2010    Mild dementia     UTI (lower urinary tract infection) 4/23/2010      Past Surgical History:   Procedure Laterality Date    ENDOSCOPY, COLON, DIAGNOSTIC      HX CATARACT REMOVAL  5/2011    left    HX UROLOGICAL      bladder uplift      Family History   Problem Relation Age of Onset    Cancer Father       History reviewed, no pertinent family history.   Social History   Substance Use Topics    Smoking status: Never Smoker    Smokeless tobacco: Never Used    Alcohol use No     Allergies   Allergen Reactions    Macrodantin [Nitrofurantoin Macrocrystalline] Rash    Pcn [Penicillins] Rash    Sulfa (Sulfonamide Antibiotics) Rash      Current Facility-Administered Medications   Medication Dose Route Frequency    methylnaltrexone (RELISTOR) injection 8 mg  8 mg SubCUTAneous DAILY    bisacodyl (DULCOLAX) suppository 10 mg  10 mg Rectal DAILY PRN    dextrose 5% and 0.9% NaCl infusion  75 mL/hr IntraVENous CONTINUOUS    oxyCODONE IR (ROXICODONE) tablet 5 mg  5 mg Oral Q4H PRN    acetaminophen (TYLENOL) tablet 650 mg  650 mg Oral Q6H    docusate sodium (COLACE) capsule 100 mg  100 mg Oral DAILY    heparin (porcine) injection 5,000 Units  5,000 Units SubCUTAneous Q8H    insulin lispro (HUMALOG) injection   SubCUTAneous QID WITH MEALS    pantoprazole (PROTONIX) 40 mg in sodium chloride 0.9% 10 mL injection  40 mg IntraVENous Q12H    glucose chewable tablet 16 g  4 Tab Oral PRN    dextrose (D50W) injection syrg 12.5-25 g  12.5-25 g IntraVENous PRN    glucagon (GLUCAGEN) injection 1 mg  1 mg IntraMUSCular PRN    sodium chloride (NS) flush 5-10 mL  5-10 mL IntraVENous PRN    sodium chloride (NS) flush 5-10 mL  5-10 mL IntraVENous Q8H    sodium chloride (NS) flush 5-10 mL  5-10 mL IntraVENous PRN    naloxone (NARCAN) injection 0.4 mg  0.4 mg IntraVENous PRN    ondansetron (ZOFRAN) injection 4 mg  4 mg IntraVENous Q6H PRN    HYDROmorphone (DILAUDID) injection 0.5 mg  0.5 mg IntraVENous Q6H PRN          LAB AND IMAGING FINDINGS:     Lab Results   Component Value Date/Time    WBC 10.5 05/03/2018 12:36 AM    HGB 10.0 (L) 05/03/2018 12:36 AM    PLATELET 255 34/43/2283 12:36 AM     Lab Results   Component Value Date/Time    Sodium 136 05/03/2018 12:36 AM    Potassium 3.3 (L) 05/03/2018 12:36 AM    Chloride 106 05/03/2018 12:36 AM    CO2 19 (L) 05/03/2018 12:36 AM    BUN 11 05/03/2018 12:36 AM    Creatinine 0.58 05/03/2018 12:36 AM    Calcium 7.5 (L) 05/03/2018 12:36 AM    Magnesium 2.0 05/03/2018 12:36 AM    Phosphorus 1.6 (L) 05/03/2018 12:36 AM      Lab Results   Component Value Date/Time    AST (SGOT) 16 05/03/2018 12:36 AM    Alk.  phosphatase 39 (L) 05/03/2018 12:36 AM    Protein, total 4.9 (L) 05/03/2018 12:36 AM    Albumin 1.7 (L) 05/03/2018 12:36 AM    Globulin 3.2 05/03/2018 12:36 AM     Lab Results   Component Value Date/Time    INR 1.1 06/30/2010 12:00 PM    Prothrombin time 10.9 06/30/2010 12:00 PM      No results found for: IRON, FE, TIBC, IBCT, PSAT, FERR No results found for: PH, PCO2, PO2  No components found for: GLPOC   No results found for: CPK, CKMB             Total time: 25 min  Counseling / coordination time, spent as noted above: 20 min  > 50% counseling / coordination?: yes    Prolonged service was provided for  []30 min   []75 min in face to face time in the presence of the patient, spent as noted above. Time Start:   Time End:   Note: this can only be billed with 61583 (initial) or 39985 (follow up). If multiple start / stop times, list each separately.

## 2018-05-03 NOTE — ANCILLARY DISCHARGE INSTRUCTIONS
[facility] Physical Therapy   [place]  [facility] [address]  Phone: [phone] Fax: [phone]      Discharge Summary 2-15    Patient name: Anastacio Camp  : 1934  Provider#: [id number]  Referral source: Manjeet Lacy MD      Medical/Treatment Diagnosis: Other abnormalities of gait and mobility [R26.89]     Prior Hospitalization: see medical history     Comorbidities: See Plan of Care  Prior Level of Function: See Plan of Care  Medications: Verified on Patient Summary List    Start of Care: 18      Onset Date:4 months prior to start of care    Visits from Start of Care: 1     Missed Visits: 1  Reporting Period : 18 to 18    Assessment/Summary of care: The patient's  called to discharge patient as she has not been compliant with HEP.       RECOMMENDATIONS:  [x]Discontinue therapy: []Patient has reached or is progressing toward set goals     []Patient is non-compliant or has abdicated     []Due to lack of appreciable progress towards set goals     [x]Other per 's request  Marylen Odor, PT 5/3/2018 11:54 AM

## 2018-05-03 NOTE — DISCHARGE INSTRUCTIONS
THIS PATIENT IS BEING ADMITTED UNDER   [facility]  [address] [place] [address]   Office [phone], Fax [phone]      Patient [address] Admission H&P    Tee Salas / [id number] : 1934    Admitted 2018 Discharged: To Be Determined     Primary care provider: Mohsen Huggins MD    Admitting provider:  Harman Baires MD  - Family Medicine Resident  Susan Haines MD - Family Medicine Attending   . . . . . . . . . . . . . . . . . . . . . . . . . . . . . . . . . . . . . . . . . . . . . . . . . . . . . . . . . . . . . . . . . . . . . . . Kalyn Gaytan HPI:   Ms. Amber Banegas is a 80 y.o.  female with a history of Dementia, GERD, UTI who presented to the Emergency Department today complaining abdominal pain. AP started suddenly this am, moderate, sharp, diffuse, no radiation, associated with subjective chills, nausea, vomiting x 1, generalized weakness motivating this ED visit. No diarrhea, took coffee and toast in am. Similar AP in the past on and off over last week, no clear association with meals. Patient was seen by PCP on 18, treated for Citrobacter UTI with cipro on  for 5 days. She denies dysuria or hematuria. Pt further denies fever, diarrhea, constipation, leg pain, leg swelling rash, chest pain, cough or shortness of breath.          Allergies   Allergen Reactions    Macrodantin [Nitrofurantoin Macrocrystalline] Rash    Pcn [Penicillins] Rash    Sulfa (Sulfonamide Antibiotics) Rash     Past Medical History:   Diagnosis Date    Alzheimer disease     Anxiety 2010    GERD (gastroesophageal reflux disease) 2010    Mild dementia     UTI (lower urinary tract infection) 2010     Past Surgical History:   Procedure Laterality Date    ENDOSCOPY, COLON, DIAGNOSTIC      HX CATARACT REMOVAL  2011    left    HX UROLOGICAL bladder uplift     Family History   Problem Relation Age of Onset    Cancer Father      Social History   Substance Use Topics    Smoking status: Never Smoker    Smokeless tobacco: Never Used    Alcohol use No       Physical Examination   Patient Vitals for the past 24 hrs:   BP Temp Pulse Resp SpO2   18 1139 128/71 97.8 °F (36.6 °C) 74 18 98 %   18 0723 137/72 98.6 °F (37 °C) 74 18 97 %   18 0702 - - 70 - -   18 0435 127/58 97.9 °F (36.6 °C) 73 18 98 %   18 0014 136/60 98.6 °F (37 °C) 79 17 96 %   18 2126 - - 76 - -   18 2000 133/66 97.9 °F (36.6 °C) 81 17 96 %   18 1641 134/69 97.8 °F (36.6 °C) 76 18 98 %     Temp (24hrs), Av.1 °F (36.7 °C), Min:97.8 °F (36.6 °C), Max:98.6 °F (37 °C)      Intake/Output Summary (Last 24 hours) at 18 1447  Last data filed at 18 1225   Gross per 24 hour   Intake           2007.5 ml   Output             1350 ml   Net            657.5 ml      O2 Flow Rate (L/min): 2 l/min   O2 Device: Room air    Visit Vitals    /71 (BP 1 Location: Right arm, BP Patient Position: At rest)    Pulse 74    Temp 97.8 °F (36.6 °C)    Resp 18    Ht 5' 3\" (1.6 m)    Wt 130 lb 8.2 oz (59.2 kg)    LMP 1995    SpO2 98%    BMI 23.12 kg/m2     General:  Alert, cooperative, no distress, appears stated age. Head:  Normocephalic, without obvious abnormality, atraumatic. Eyes:  Conjunctivae/corneas clear. PERRL, EOMs intact. Fundi benign. Ears:  Normal TMs and external ear canals both ears. Nose: Nares normal. Septum midline. Mucosa normal. No drainage or sinus tenderness. Throat: Lips, mucosa, and tongue normal. Teeth and gums normal.   Neck: Supple, symmetrical, trachea midline, no adenopathy, thyroid: no enlargement/tenderness/nodules, no carotid bruit and no JVD. Back:   Symmetric, no curvature. ROM normal. No CVA tenderness. Lungs:   Clear to auscultation bilaterally. Chest wall:  No tenderness or deformity. Heart:  Regular rate and rhythm, S1, S2 normal, no murmur, click, rub or gallop. Breast Exam:  No tenderness, masses, or nipple abnormality. Abdomen:   Soft, TTP diffusely but improved from exam previously. Surgical site c/d/i   Genitalia:  Normal female without lesion, discharge or tenderness. Rectal:  Normal tone,  no masses or tenderness  Guaiac negative stool. Extremities: Extremities normal, atraumatic, no cyanosis or edema. Pulses: 2+ and symmetric all extremities. Skin: Skin color, texture, turgor normal. No rashes or lesions. Lymph nodes: Cervical, supraclavicular, and axillary nodes normal.   Neurologic: CNII-XII intact. Normal strength, sensation and reflexes throughout. Data Review    LABS:    Recent Labs      05/03/18   0036   WBC  10.5   HGB  10.0*   HCT  30.2*   PLT  224     Recent Labs      05/03/18 0036   NA  136   K  3.3*   CL  106   CO2  19*   GLU  120*   BUN  11   CREA  0.58   CA  7.5*   MG  2.0   PHOS  1.6*   ALB  1.7*   SGOT  16   ALT  7*     No results for input(s): INR in the last 72 hours. No lab exists for component: INREXT, INREXT    Cultures: Blood cx negative 4days, urine cx negative 1 day    Lab Results   Component Value Date/Time    Specimen Description: URINE 01/18/2012 05:00 PM    Specimen Description: URINE 10/20/2010 11:35 AM    Specimen Description: URINE 08/31/2010 10:51 AM     Lab Results   Component Value Date/Time    Culture result: NO GROWTH 1 DAY 04/30/2018 05:04 AM    Culture result: MRSA NOT PRESENT 04/30/2018 04:54 AM    Culture result:  04/30/2018 04:54 AM         Screening of patient nares for MRSA is for surveillance purposes and, if positive, to facilitate isolation considerations in high risk settings. It is not intended for automatic decolonization interventions per se as regimens are not sufficiently effective to warrant routine use. Admission Imaging:  Ct Abd Pelv W Cont    Addendum Date: 4/30/2018    Addendum: Addendum:  The distal portion of the appendix demonstrates wall irregularity and hyperenhancement with surrounding fluid and fat stranding. There is a small amount of adjacent extraluminal air (series 601B, image 35; series 2, image 67). Findings are suggestive of acute appendicitis with contained perforation. There is no evidence for abscess or drainable collection. There are a few foci of air within the urinary bladder, which can be seen from recent catheterization or infection. Correlate with urinalysis. The findings were discussed with the ICU by Dr. Chintan Lyons on 4/30/2018 at 0940 hours. Result Date: 4/30/2018  EXAM:  CT ABD PELV W CONT INDICATION: Abdominal pain COMPARISON: Ultrasound from 2013 CONTRAST:  100 mL of Isovue-370. TECHNIQUE: Following the uneventful intravenous administration of contrast, thin axial images were obtained through the abdomen and pelvis. Coronal and sagittal reconstructions were generated. Oral contrast was not administered. CT dose reduction was achieved through use of a standardized protocol tailored for this examination and automatic exposure control for dose modulation. FINDINGS: LUNG BASES: Bibasilar subsegmental atelectasis. 5 mm micronodule in the right middle lobe (image 3). INCIDENTALLY IMAGED HEART AND MEDIASTINUM: Small pericardial effusion. Coronary artery calcification. LIVER: No mass or biliary dilatation. GALLBLADDER: Unremarkable. SPLEEN: No mass. PANCREAS: No mass or ductal dilatation. ADRENALS: Unremarkable. KIDNEYS: No mass, calculus, or hydronephrosis. STOMACH: Unremarkable. SMALL BOWEL: No dilatation or wall thickening. COLON: No dilatation or wall thickening. Multiple sigmoid diverticula. APPENDIX: Unremarkable. Axial image 70 PERITONEUM: Moderate amount of ascites associated with a thickened 7 cm segment of sigmoid. Multiple sigmoid diverticula (axial image 71 and coronal image 36). RETROPERITONEUM: No lymphadenopathy or aortic aneurysm.  REPRODUCTIVE ORGANS: Small uterus URINARY BLADDER: No mass or calculus. BONES: No destructive bone lesion. ADDITIONAL COMMENTS: Disc desiccation at the lumbosacral junction. IMPRESSION: Small pericardial effusion Right middle lobe 5 mm micronodule, for which follow-up is needed Sigmoid diverticulosis Guidelines for Management of Incidental Pulmonary Nodules Detected on CT Images: from the Fleischner Society 2017 LOW-RISK PATIENTS: LESS THAN OR EQUAL TO 6 MM:  No routine follow-up needed. GREATER THAN 6-8 MM: CT at 6-12 months, if multiple at 3-6 months, then consider CT at 18-24 months. GREATER THAN 8 MM:  Consider CT at 3 months, PET/CT, or tissue sampling. If multiple CT at 3-6 months, then consider CT at 18-24 months. HIGH-RISK PATIENTS: LESS THAN OR EQUAL TO 6 MM:  Optional CT at 12 months. GREATER THAN 6-8 MM: CT at 6-12 months, if multiple at 3-6 months, then CT at 18-24 months. GREATER THAN 8 MM:  Consider CT at 3 months, PET/CT, or tissue sampling. If multiple CT at 3-6 months, then at 18-24 months. Guidelines for Management of Incidental Pulmonary Nodules Detected on CT : A Statement from the [address] [place] [address] 2017\"  Wilfred Johnson. Radiology 2017; 000:1-16     [address] [facility]    Result Date: 4/29/2018  INDICATION:   RUQ pain, fever, elev WBC, Wolf's sign COMPARISON:  CT earlier today FINDINGS: Limited right upper quadrant ultrasound was performed. The gallbladder is normal. There is no cholelithiasis, wall thickening, pericholecystic fluid, or sonographic Wolf's sign. The common bile duct is not dilated, and measures 5-6 mm. No free fluid right upper quadrant. IMPRESSION: No acute process. Xr Chest Port    Result Date: 4/29/2018  INDICATION:   Sepsis EXAM:  AP CHEST RADIOGRAPH COMPARISON: September 20, 2012 FINDINGS: AP portable view of the chest demonstrates a normal cardiomediastinal silhouette. The lungs are adequately expanded. There is no edema, effusion, consolidation, or pneumothorax.   The osseous structures are unremarkable. IMPRESSION: No acute process. FINAL DIAGNOSES & HOSPITAL COURSE:  Ms. Mercedes Habermann is a 80 y.o.  female with a history of Dementia, GERD, UTI who presented to the Emergency Department today complaining abdominal pain. AP started suddenly this am, moderate, sharp, diffuse, no radiation, associated with subjective chills, nausea, vomiting x 1, generalized weakness motivating this ED visit. No diarrhea, took coffee and toast in am. Similar AP in the past on and off over last week, no clear association with meals. Patient was seen by PCP on 4/05/18, treated for Citrobacter UTI with cipro on 4/13 for 5 days. She denies dysuria or hematuria. Pt further denies fever, diarrhea, constipation, leg pain, leg swelling rash, chest pain, cough or shortness of breath. Final discharge diagnoses and brief hospital course    Sepsis 2/2 Performated Appendicitis s/p Laparoscopic repair on 4/30/18: Per Surgery, patient had peritonitis with purulent exudate covering peritoneum of RLQ, ileum, sigmoid colon. Patient improved clinically on daily basis with use of Dilaudid PRN and roxicodone PRN. Patient was treated with cefepime and flagyl and transitioned to levaquin and flagyl to complete total of 14 days of abx, thus patient discharged with levaquin 750mg daily X 9 days and flagyl 500mg Q8hrs X 9 days. At time of discharge, blood cx no growth 4 days and urine cx no growth 1 day.     New onset A-fib: On 5/2 patient found to be in A-fib on telemetry monitoring, confirmed by EKG. Troponin negative. BNP elevated at 1757. ECHO showed EF 48-56% with basal mid-inferolateral wall hypokinesis. Cardiology consulted and recommended short acting diltiazem which was then discontinued after HR normalized. CHADs 2 Vasc score 3. Pt has 3.2% risk of CVA without anticoagulation. Will assess fall risk and discuss with family. At discharge, patient was started on eliquis for anticoagulation.  Patient will follow up with cardiology.      Urinary retention: Patient has known history of urinary retention. Ryder was placed on admission and removed postop, however patient continued to have retention and required straight cath. Patient will continue to have straight cath as needed upon discharge to SNF. Hyponatremia resolved: mild, POA : 135. Patient given IVF. Sodium 136 at discharge. Hypomagnesemia: Magnesium low at 1.5 on 5/2/18. Monitored with daily CMP. Mg 2.0 at discharge.       GERD: stable, not on any home meds      Right middle lobe nodule: 5mm in size  - outpatient follow up      Dementia: Mild, stable not on any home meds  -Palliative consulted. Family meeting on 5/2, decision made by family to make pt DNR    FOLLOW-UP CARE RECOMMENDATIONS:    Appointments  Follow-up Information     Follow up With Details Comments Contact Info    Gin Costello MD Schedule an appointment as soon as possible for a visit For follow up after appendectomy [address] [place] [address]      Rosemarie Castillo MD Go to Monday at 10:50 am [address] [place] [address]  [phone]                  It is very important that you keep follow-up appointment(s). Bring discharge papers, medication list (and/or medication bottles) to follow-up appointments for review by outpatient provider(s). FOLLOW-UP TESTS RECOMMENDED:   · none    ONGOING TREATMENT PLAN:     -as stated above    PENDING TEST RESULTS:  At the time of discharge the following test results are still pending: none. Please review these results as they become available. Specific symptoms to watch for: chest pain, shortness of breath, fever, chills, nausea, vomiting, diarrhea, change in mentation, falling, weakness, bleeding.     DIET:  GI Lite Diet    ACTIVITY:  Activity as tolerated    WOUND CARE: Keep wound clean and dry    EQUIPMENT needed:  none    INCIDENTAL FINDINGS:  none    GOALS OF CARE:    Eventual return to home/independent/assisted living   x  Long term SNF      Hospice     No rehospitalization     Patient condition:   Functional status    Poor    x  Deconditioned      Independent   Cognition    Lucid     Forgetful (some sensescence)   x  Dementia   Catheters/lines (plus indication)    Ryder     PICC      PEG    X  May require intermittent straight cath   Code status    Full code    x  DNR    . . . . . . . . . . . . . . . . . . . . . . . . . . . . . . . . . . . . . . . . . . . . . . . . . . . . . . . . . . . . . . . . . . . . . . . Najma Tatum CHRONIC MEDICAL CONDITIONS:  Problem List as of 5/3/2018  Date Reviewed: 4/11/2018          Codes Class Noted - Resolved    Prediabetes ICD-10-CM: R73.03  ICD-9-CM: 790.29  4/30/2018 - Present        SIRS (systemic inflammatory response syndrome) ([facility] [address] ICD-10-CM: R65.10  ICD-9-CM: 995.90  4/30/2018 - Present        * (Principal)Abdominal pain ICD-10-CM: R10.9  ICD-9-CM: 789.00  4/29/2018 - Present        Dementia without behavioral disturbance ICD-10-CM: F03.90  ICD-9-CM: 294.20  5/13/2015 - Present        Memory loss ICD-10-CM: R41.3  ICD-9-CM: 780.93  5/13/2015 - Present        Fatigue ICD-10-CM: R53.83  ICD-9-CM: 780.79  4/10/2015 - Present        Urinary urgency ICD-10-CM: R39.15  ICD-9-CM: 788.63  9/27/2012 - Present    Overview Signed 9/27/2012 11:00 AM by Erika Moreno MD     consider seeing Dr. Amna Wilson if symptoms persist             Dizziness ICD-10-CM: V67  ICD-9-CM: 780.4  9/20/2012 - Present    Overview Signed 9/20/2012  7:57 AM by Erika Moreno MD     MRI brain 9/2012: Atrophy and white matter disease. No significant or acute   findings.                 Nonspecific abnormal electrocardiogram (ECG) (EKG) ICD-10-CM: R94.31  ICD-9-CM: 794.31  9/20/2012 - Present        Anxiety ICD-10-CM: F41.9  ICD-9-CM: 300.00  4/23/2010 - Present        GERD (gastroesophageal reflux disease) ICD-10-CM: K21.9  ICD-9-CM: 530.81  4/23/2010 - Present        RESOLVED: UTI (lower urinary tract infection) ICD-10-CM: N39.0  ICD-9-CM: 599.0  4/23/2010 - 7/25/2017                Admission H&P and Discharge Medications Reviewed by:        _____________________________________________  Andrae Macario MD                 Abdominal Pain: Care Instructions  Your Care Instructions    Abdominal pain has many possible causes. Some aren't serious and get better on their own in a few days. Others need more testing and treatment. If your pain continues or gets worse, you need to be rechecked and may need more tests to find out what is wrong. You may need surgery to correct the problem. Don't ignore new symptoms, such as fever, nausea and vomiting, urination problems, pain that gets worse, and dizziness. These may be signs of a more serious problem. Your doctor may have recommended a follow-up visit in the next 8 to 12 hours. If you are not getting better, you may need more tests or treatment. The doctor has checked you carefully, but problems can develop later. If you notice any problems or new symptoms, get medical treatment right away. Follow-up care is a key part of your treatment and safety. Be sure to make and go to all appointments, and call your doctor if you are having problems. It's also a good idea to know your test results and keep a list of the medicines you take. How can you care for yourself at home? · Rest until you feel better. · To prevent dehydration, drink plenty of fluids, enough so that your urine is light yellow or clear like water. Choose water and other caffeine-free clear liquids until you feel better. If you have kidney, heart, or liver disease and have to limit fluids, talk with your doctor before you increase the amount of fluids you drink. · If your stomach is upset, eat mild foods, such as rice, dry toast or crackers, bananas, and applesauce. Try eating several small meals instead of two or three large ones.   · Wait until 48 hours after all symptoms have gone away before you have spicy foods, alcohol, and drinks that contain caffeine. · Do not eat foods that are high in fat. · Avoid anti-inflammatory medicines such as aspirin, ibuprofen (Advil, Motrin), and naproxen (Aleve). These can cause stomach upset. Talk to your doctor if you take daily aspirin for another health problem. When should you call for help? Call 911 anytime you think you may need emergency care. For example, call if:  ? · You passed out (lost consciousness). ? · You pass maroon or very bloody stools. ? · You vomit blood or what looks like coffee grounds. ? · You have new, severe belly pain. ?Call your doctor now or seek immediate medical care if:  ? · Your pain gets worse, especially if it becomes focused in one area of your belly. ? · You have a new or higher fever. ? · Your stools are black and look like tar, or they have streaks of blood. ? · You have unexpected vaginal bleeding. ? · You have symptoms of a urinary tract infection. These may include:  ¨ Pain when you urinate. ¨ Urinating more often than usual.  ¨ Blood in your urine. ? · You are dizzy or lightheaded, or you feel like you may faint. ? Watch closely for changes in your health, and be sure to contact your doctor if:  ? · You are not getting better after 1 day (24 hours). Where can you learn more? Go to http://kim-holly.info/. Enter F279 in the search box to learn more about \"Abdominal Pain: Care Instructions. \"  Current as of: March 20, 2017  Content Version: 11.4  © 2326-1801 [employer]. Care instructions adapted under license by [employer] (which disclaims liability or warranty for this information). If you have questions about a medical condition or this instruction, always ask your healthcare professional. [address] any warranty or liability for your use of this information.          Dementia: Care Instructions  Your Care Instructions    Dementia is a loss of mental skills that affects your daily life. It is different than the occasional trouble with memory that is part of aging. You may find it hard to remember things that you feel you should be able to remember. Or you may feel that your mind is just not working as well as usual.  Finding out that you have dementia is a shock. You may be afraid and worried about how the condition will change your life. Although there is no cure at this time, medicine may slow memory loss and improve thinking for a while. Other medicines may be able to help you sleep or cope with depression and behavior changes. Dementia often gets worse slowly. But it can get worse quickly. As dementia gets worse, it may become harder to do common things that take planning, like making a list and going shopping. Over time, the disease may make it hard for you to take care of yourself. Some people with dementia need others to help care for them. Dementia is different for everyone. You may be able to function well for a long time. In the early stage of the condition, you can do things at home to make life easier and safer. You also can keep doing your hobbies and other activities. Many people find comfort in planning now for their future needs. Follow-up care is a key part of your treatment and safety. Be sure to make and go to all appointments, and call your doctor if you are having problems. It's also a good idea to know your test results and keep a list of the medicines you take. How can you care for yourself at home? · Take your medicines exactly as prescribed. Call your doctor if you think you are having a problem with your medicine. · Eat healthy foods. Eat lots of whole grains, fruits, and vegetables every day. If you are not hungry, try snacks or nutritional drinks such as Boost, Ensure, or Sustacal.  · If you have problems sleeping:  ¨ Try not to nap too close to your bedtime. ¨ Exercise regularly. Walking is a good choice.   ¨ Try a glass of warm milk or caffeine-free herbal tea before bed. · Do tasks and activities during the time of day when you feel your best. It may help to develop a daily routine. · Post labels, lists, and sticky notes to help you remember things. Write your activities on a calendar you can easily find. Put your clock where you can easily see it. · Stay active. Take walks in familiar places, or with friends or loved ones. Try to stay active mentally too. Read and work crossword puzzles if you enjoy these activities. · Do not drive unless you can pass an on-road driving test. If you are not sure if you are safe to drive, your state 's license bureau can test you. · Keep a cordless phone and a flashlight with new batteries by your bed. If possible, put a phone in each of the main rooms of your house, or carry a cell phone in case you fall and cannot reach a phone. Or, you can wear a device around your neck or wrist. You push a button that sends a signal for help. Acknowledge your emotions and plan for the future  · Talk openly and honestly with your doctor. · Let yourself grieve. It is common to feel angry, scared, frustrated, anxious, or depressed. · Get emotional support from family, friends, a support group, or a counselor experienced in working with people who have dementia. · Ask for help if you need it. · Plan for the future. ¨ Talk to your family and doctor about preparing a living will and other important papers while you can make decisions. These papers tell your doctors how to care for you at the end of your life. ¨ Consider naming a person to make decisions about your care if you are not able to. When should you call for help? Call 911 anytime you think you may need emergency care. For example, call if:  ? · You are lost and do not know whom to call. ? · You are injured and do not know whom to call.    ?Call your doctor now or seek immediate medical care if:  ? · You are more confused or upset than usual.   ? · You feel like you could hurt yourself because your mind is not working well. ? Watch closely for changes in your health, and be sure to contact your doctor if you have any problems. Where can you learn more? Go to http://kim-holly.info/. Enter S156 in the search box to learn more about \"Dementia: Care Instructions. \"  Current as of: May 12, 2017  Content Version: 11.4  © 9603-8822 [employer], [employer]. Care instructions adapted under license by [employer] (which disclaims liability or warranty for this information). If you have questions about a medical condition or this instruction, always ask your healthcare professional. [address] any warranty or liability for your use of this information.       .sds

## 2018-05-03 NOTE — PROGRESS NOTES
Bedside shift change report given to Kaleb Crowe RN (oncoming nurse) by Linda Farmer RN (offgoing nurse). Report included the following information SBAR.

## 2018-05-03 NOTE — PROGRESS NOTES
5/3/2018 1:51 PM Ambulance transport arranged for [facility] report to [phone]. Ambulance packet will be on hard chart. Pt's daughter and  are aware of discharge today and time. 5/3/2018 2:07 PM [facility] completed ref # [id number]. 5/3/2018  12:32 PM Spoke with Ankita in admissions at [facility] who confirmed they can accept pt today. CM will follow up. 5/3/2018 11:55 AM Spoke with [facility] resident who reported pt is ready for discharge. Called and lvm with admissions at [facility] to confirm they can accept pt today. CM will follow up.  Jessica Huang, DINORAW

## 2018-05-03 NOTE — PROGRESS NOTES
General Surgery Daily Progress Note    Patient: Liza Moon MRN: [id number]  SSN: [id number]    YOB: 1934  Age: 80 y.o. Sex: female      Admit Date: 4/29/2018    Subjective:   Pain improved, tolerating fulls but does not like the food. AF yesterday, now in NSR, cardiology following.      Current Facility-Administered Medications   Medication Dose Route Frequency    potassium phosphate 21 mmol in 0.9% sodium chloride 250 mL infusion   IntraVENous ONCE    methylnaltrexone (RELISTOR) injection 8 mg  8 mg SubCUTAneous DAILY    bisacodyl (DULCOLAX) suppository 10 mg  10 mg Rectal DAILY PRN    dextrose 5% and 0.9% NaCl infusion  75 mL/hr IntraVENous CONTINUOUS    oxyCODONE IR (ROXICODONE) tablet 5 mg  5 mg Oral Q4H PRN    acetaminophen (TYLENOL) tablet 650 mg  650 mg Oral Q6H    docusate sodium (COLACE) capsule 100 mg  100 mg Oral DAILY    heparin (porcine) injection 5,000 Units  5,000 Units SubCUTAneous Q8H    insulin lispro (HUMALOG) injection   SubCUTAneous QID WITH MEALS    pantoprazole (PROTONIX) 40 mg in sodium chloride 0.9% 10 mL injection  40 mg IntraVENous Q12H    glucose chewable tablet 16 g  4 Tab Oral PRN    dextrose (D50W) injection syrg 12.5-25 g  12.5-25 g IntraVENous PRN    glucagon (GLUCAGEN) injection 1 mg  1 mg IntraMUSCular PRN    sodium chloride (NS) flush 5-10 mL  5-10 mL IntraVENous PRN    cefepime (MAXIPIME) 2 g in 0.9% sodium chloride (MBP/ADV) 100 mL  2 g IntraVENous Q8H    sodium chloride (NS) flush 5-10 mL  5-10 mL IntraVENous Q8H    sodium chloride (NS) flush 5-10 mL  5-10 mL IntraVENous PRN    naloxone (NARCAN) injection 0.4 mg  0.4 mg IntraVENous PRN    ondansetron (ZOFRAN) injection 4 mg  4 mg IntraVENous Q6H PRN    metroNIDAZOLE (FLAGYL) IVPB premix 500 mg  500 mg IntraVENous Q12H    HYDROmorphone (DILAUDID) injection 0.5 mg  0.5 mg IntraVENous Q6H PRN        Objective:      05/01 1901 - 05/03 0700  In: 300 [P.O.:300]  Out: 3957 [Urine:1750; Drains:60]  Patient Vitals for the past 8 hrs:   BP Temp Pulse Resp SpO2   05/03/18 0723 137/72 98.6 °F (37 °C) 74 18 97 %   05/03/18 0702 - - 70 - -   05/03/18 0435 127/58 97.9 °F (36.6 °C) 73 18 98 %       Physical Exam:  General: Alert, cooperative, confused  Lungs: Unlabored  Heart:  Regular rate and  rhythm  Abdomen: Soft, ATTP, non-distended. Incisions c/d/i. MARCUS drain SS. Extremities: Warm, moves all, no edema  Skin:  Warm and dry, no rash    Labs:   Recent Labs      05/03/18   0036   WBC  10.5   HGB  10.0*   HCT  30.2*   PLT  224     Recent Labs      05/03/18   0036   NA  136   K  3.3*   CL  106   CO2  19*   GLU  120*   BUN  11   CREA  0.58   CA  7.5*   MG  2.0   PHOS  1.6*   ALB  1.7*   TBILI  0.5   SGOT  16   ALT  7*       Assessment / Plan:   · POD#3 lap appendectomy for perforated appendicitis  · Abdominal exam appropriate  · Advance diet   · Continue ABX for total of 2 weeks of treatment. May transition to Levaquin/Flagyl at discharge  · Strict I/O  · Replete K and phos  · Remove MARCUS  · Scheduled Tylenol and low-dose Toradol.  PRN Radha  · PT/OT, encourage IS  · Bowel regimen, Relistor

## 2018-05-03 NOTE — DISCHARGE SUMMARY
[facility] [facility] [address] [place] [address]   Office [phone], Fax [phone]        Discharge Summary     Patient: Melita Perla       MRN: [id number]       YOB: 1934       Age: 80 y.o. Date of admission:  4/29/2018    Date of discharge:  5/3/2018    Primary care provider:  Margret Chong MD     Admitting provider:  Jose Perdomo DO    Discharging provider(s): Kenneth Lei MD - Family Medicine Resident  Dr. Raheel Levy Attending     Consultations  Alen Inru    Procedures  · Laparoscopic appendectomy   · ECHO on 5/2/18    Discharge destination: SNF. The patient is stable for discharge. Admission diagnosis  Abdominal pain  Appendicitis    Admission HPI per admitting provider:  Ms. Mary Lopez is a 80 y.o.  female with a history of Dementia, GERD, UTI who presented to the Emergency Department today complaining abdominal pain. AP started suddenly this am, moderate, sharp, diffuse, no radiation, associated with subjective chills, nausea, vomiting x 1, generalized weakness motivating this ED visit. No diarrhea, took coffee and toast in am. Similar AP in the past on and off over last week, no clear association with meals. Patient was seen by PCP on 4/05/18, treated for Citrobacter UTI with cipro on 4/13 for 5 days. She denies dysuria or hematuria. Pt further denies fever, diarrhea, constipation, leg pain, leg swelling rash, chest pain, cough or shortness of breath. Final discharge diagnoses and brief hospital course    Sepsis 2/2 Performated Appendicitis s/p Laparoscopic repair on 4/30/18: Per Surgery, patient had peritonitis with purulent exudate covering peritoneum of RLQ, ileum, sigmoid colon. Patient improved clinically on daily basis with use of Dilaudid PRN and roxicodone PRN.  Patient was treated with cefepime and flagyl and transitioned to levaquin and flagyl to complete total of 14 days of abx, thus patient discharged with levaquin 750mg daily X 9 days and flagyl 500mg Q8hrs X 9 days. At time of discharge, blood cx no growth 4 days and urine cx no growth 1 day.     New onset A-fib: On 5/2 patient found to be in A-fib on telemetry monitoring, confirmed by EKG. Troponin negative. BNP elevated at 1757. ECHO showed EF 48-56% with basal mid-inferolateral wall hypokinesis. Cardiology consulted and recommended short acting diltiazem which was then discontinued after HR normalized. CHADs 2 Vasc score 3. Pt has 3.2% risk of CVA without anticoagulation. Will assess fall risk and discuss with family. At discharge, patient was started on eliquis for anticoagulation.      Urinary retention: Patient has known history of urinary retention. Ryder was placed on admission and removed postop, however patient continued to have retention and required straight cath. Patient will continue to have straight cath as needed upon discharge to SNF. Hyponatremia resolved: mild, POA : 135. Patient given IVF. Sodium 136 at discharge. Hypomagnesemia: Magnesium low at 1.5 on 5/2/18. Monitored with daily CMP. Mg 2.0 at discharge.       GERD: stable, not on any home meds      Right middle lobe nodule: 5mm in size  - outpatient follow up      Dementia: Mild, stable not on any home meds  -Palliative consulted. Family meeting on 5/2, decision made by family to make pt DNR      Labs/Imaging Needed on follow up: none     Follow-up Care:   Follow-up Information     Follow up With Details Comments Contact Info    Nisha Tidwell MD  For follow up after appendectomy [address] [facility] [address]  65 Dorothea Mcallister MD  Monday at 10:50 am [address] [place] [address]      Irving Kim. Savita Agudelo [address] [address]  [phone]            Physical examination at discharge  Visit Vitals    /71 (BP 1 Location: Right arm, BP Patient Position: At rest)    Pulse 74    Temp 97.8 °F (36.6 °C)    Resp 18    Ht 5' 3\" (1.6 m)    Wt 130 lb 8.2 oz (59.2 kg)    SpO2 98%    BMI 23.12 kg/m2       General:  Alert, cooperative, no distress   Head:  Normocephalic, without obvious abnormality, atraumatic   Eyes:  Conjunctivae/corneas clear. PERRL, EOMs intact   E/N/M/T: Nares normal. Septum midline.  No nasal drainage or sinus tenderness  Lips, mucosa, and tongue normal   Clear oropharynx   Neck: Normal appearance and movements, symmetrical, trachea midline  No palpable adenopathy  No thyroid enlargement, tenderness or nodules  No carotid bruit   Normal JVP   Lungs:   Symmetrical chest expansion and respiratory effort  Clear to auscultation bilaterally   Chest wall:  No tenderness or deformity   Heart:  Regular rhythm   Sounds normal; no murmur, click, rub or gallop   Abdomen:   Soft, TTP diffusely but improved since previous exam.     Back: No CVA tenderness   Extremities: Extremities normal, atraumatic  No cyanosis or edema  No DVT signs   Pulses 2+ and symmetric all extremities   Skin: No rashes or ulcers   Musculo-      skeletal: Gait not tested  Normal symmetry, ROM, strength and tone   Neuro: Normal cranial nerves  Normal reflexes and sensation   Psych: Alert, oriented x3  Normal affect, judgement and insight        Recent Labs      05/03/18   0036  05/02/18   1212  05/02/18   0410  05/01/18   0402   WBC  10.5   --   10.4  10.5   HGB  10.0*  10.5*  9.8*  11.4*   HCT  30.2*  31.8*  30.7*  34.8*   PLT  224   --   204  203     Recent Labs      05/03/18   0036  05/02/18   0410  05/01/18   0402   NA  136  134*  136   K  3.3*  3.7  3.6   CL  106  107  106   CO2  19*  21  21   BUN  11  11  10   CREA  0.58  0.65  0.70   GLU  120*  104*  152*   CA  7.5*  8.0*  8.0*   MG  2.0  2.2  1.5*   PHOS  1.6*   --    --      Recent Labs      05/03/18   0036  05/02/18   0410 05/01/18   0402   SGOT  16  14*  14*   AP  39*  34*  36*   TP  4.9*  5.0*  5.2*   ALB  1.7*  1.8*  2.1*   GLOB  3.2  3.2  3.1     No results for input(s): INR, PTP, APTT in the last 72 hours. No lab exists for component: INREXT, INREXT    No results for input(s): FE, TIBC, PSAT, FERR in the last 72 hours. No results for input(s): PH, PCO2, PO2 in the last 72 hours. No results for input(s): CPK, CKMB in the last 72 hours. No lab exists for component: TROPONINI  No components found for: [place]      Current Discharge Medication List      START taking these medications    Details   apixaban (ELIQUIS) 2.5 mg tablet Take 1 Tab by mouth two (2) times a day. Qty: 60 Tab, Refills: 0         CONTINUE these medications which have NOT CHANGED    Details   cranberry extract 450 mg tab tablet Take 450 mg by mouth daily. Admission imaging studies:      Results from Hospital Encounter encounter on 04/29/18   XR CHEST PORT   Narrative INDICATION:   Sepsis    EXAM:  AP CHEST RADIOGRAPH    COMPARISON: September 20, 2012    FINDINGS:    AP portable view of the chest demonstrates a normal cardiomediastinal  silhouette. The lungs are adequately expanded. There is no edema, effusion,  consolidation, or pneumothorax. The osseous structures are unremarkable. Impression IMPRESSION:  No acute process. Results from [place] encounter on 04/29/18   US ABD LTD   Narrative INDICATION:   RUQ pain, fever, elev WBC, Wolf's sign     COMPARISON:  CT earlier today    FINDINGS:     Limited right upper quadrant ultrasound was performed. The gallbladder is normal. There is no cholelithiasis, wall thickening,  pericholecystic fluid, or sonographic Wolf's sign. The common bile duct is not  dilated, and measures 5-6 mm. No free fluid right upper quadrant. Impression IMPRESSION:   No acute process.                    Results from [place] encounter on 04/29/18   CT ABD PELV W CONT   Addendum Addendum: Addendum: The distal portion of the appendix demonstrates wall irregularity and hyperenhancement with surrounding fluid and fat stranding. There is a  small amount of adjacent extraluminal air (series 601B, image 35; series 2,  image 67). Findings are suggestive of acute appendicitis with contained perforation. There is no evidence for abscess or drainable collection. There are a few foci of air within the urinary bladder, which can be seen  from recent catheterization or infection. Correlate with urinalysis. The findings were discussed with the ICU by Dr. Kylee Potts on 4/30/2018 at  0940 hours. Leta Olmedo MD 4/30/2018  9:44 AM             Narrative EXAM:  CT ABD PELV W CONT    INDICATION: Abdominal pain    COMPARISON: Ultrasound from 2013    CONTRAST:  100 mL of Isovue-370. TECHNIQUE:   Following the uneventful intravenous administration of contrast, thin axial  images were obtained through the abdomen and pelvis. Coronal and sagittal  reconstructions were generated. Oral contrast was not administered. CT dose  reduction was achieved through use of a standardized protocol tailored for this  examination and automatic exposure control for dose modulation. FINDINGS:   LUNG BASES: Bibasilar subsegmental atelectasis. 5 mm micronodule in the right  middle lobe (image 3). INCIDENTALLY IMAGED HEART AND MEDIASTINUM: Small pericardial effusion. Coronary  artery calcification. LIVER: No mass or biliary dilatation. GALLBLADDER: Unremarkable. SPLEEN: No mass. PANCREAS: No mass or ductal dilatation. ADRENALS: Unremarkable. KIDNEYS: No mass, calculus, or hydronephrosis. STOMACH: Unremarkable. SMALL BOWEL: No dilatation or wall thickening. COLON: No dilatation or wall thickening. Multiple sigmoid diverticula. APPENDIX: Unremarkable. Axial image 70  PERITONEUM: Moderate amount of ascites associated with a thickened 7 cm segment  of sigmoid.  Multiple sigmoid diverticula (axial image 71 and coronal image 36). RETROPERITONEUM: No lymphadenopathy or aortic aneurysm. REPRODUCTIVE ORGANS: Small uterus  URINARY BLADDER: No mass or calculus. BONES: No destructive bone lesion. ADDITIONAL COMMENTS: Disc desiccation at the lumbosacral junction. Impression IMPRESSION:  Small pericardial effusion  Right middle lobe 5 mm micronodule, for which follow-up is needed  Sigmoid diverticulosis    Guidelines for Management of Incidental Pulmonary Nodules Detected on CT Images:  from the Fleischner Society 2017    LOW-RISK PATIENTS:    LESS THAN OR EQUAL TO 6 MM:  No routine follow-up needed. GREATER THAN 6-8 MM: CT at 6-12 months, if multiple at 3-6 months, then consider  CT at 18-24 months. GREATER THAN 8 MM:  Consider CT at 3 months, PET/CT, or tissue sampling. If  multiple CT at 3-6 months, then consider CT at 18-24 months. HIGH-RISK PATIENTS:    LESS THAN OR EQUAL TO 6 MM:  Optional CT at 12 months. GREATER THAN 6-8 MM: CT at 6-12 months, if multiple at 3-6 months, then CT at  18-24 months. GREATER THAN 8 MM:  Consider CT at 3 months, PET/CT, or tissue sampling. If  multiple CT at 3-6 months, then at 18-24 months.     Guidelines for Management of Incidental Pulmonary Nodules Detected on CT : A  Statement from the [address] [place] [address] 2017\"  Simon Pham. Radiology  2017; 000:1-16                      No procedure found.      -------------------------------------------------------------------------------------------------------------------    Chronic Diagnoses:    Problem List as of 5/3/2018  Date Reviewed: 4/11/2018          Codes Class Noted - Resolved    Prediabetes ICD-10-CM: R73.03  ICD-9-CM: 790.29  4/30/2018 - Present        SIRS (systemic inflammatory response syndrome) ([facility] ICD-10-CM: R65.10  ICD-9-CM: 995.90  4/30/2018 - Present        * (Principal)Abdominal pain ICD-10-CM: R10.9  ICD-9-CM: 789.00  4/29/2018 - Present        Dementia without behavioral disturbance ICD-10-CM: F03.90  ICD-9-CM: 294.20  5/13/2015 - Present        Memory loss ICD-10-CM: R41.3  ICD-9-CM: 780.93  5/13/2015 - Present        Fatigue ICD-10-CM: R53.83  ICD-9-CM: 780.79  4/10/2015 - Present        Urinary urgency ICD-10-CM: R39.15  ICD-9-CM: 788.63  9/27/2012 - Present    Overview Signed 9/27/2012 11:00 AM by Margaret Mirza MD     consider seeing Dr. Duane Maclachlan if symptoms persist             Dizziness ICD-10-CM: Maximino Samuels  ICD-9-CM: 780.4  9/20/2012 - Present    Overview Signed 9/20/2012  7:57 AM by Margaret Mirza MD     MRI brain 9/2012: Atrophy and white matter disease. No significant or acute   findings.                 Nonspecific abnormal electrocardiogram (ECG) (EKG) ICD-10-CM: R94.31  ICD-9-CM: 794.31  9/20/2012 - Present        Anxiety ICD-10-CM: F41.9  ICD-9-CM: 300.00  4/23/2010 - Present        GERD (gastroesophageal reflux disease) ICD-10-CM: K21.9  ICD-9-CM: 530.81  4/23/2010 - Present        RESOLVED: UTI (lower urinary tract infection) ICD-10-CM: N39.0  ICD-9-CM: 599.0  4/23/2010 - 7/25/2017                Signed:      Rhoda Chan MD   Family Medicine Resident      5/3/2018   1:20 PM     Dr. Marge Blair Attending      Cc: Gaylia Olszewski, MD

## 2018-05-03 NOTE — PALLIATIVE CARE DISCHARGE
Goals of Care/Treatment Preferences    The Palliative Medicine team was consulted as part of your/your loved one's care in the hospital. Our team is a supportive service; we strive to relieve suffering and improve quality of life. You met with Nurse Practitioner Catie Daniel and  Natasha Olea. We reviewed advance care planning information, which includes the following:  Patient's [address] is[de-identified] Verbal statement (Legal Next of Kin remains as decision maker)  Primary Decision Maker Name: Nova Hanley  Primary Decision Maker Phone Number: [phone]  Primary Decision Maker Relationship to Patient: Spouse  Confirm Advance Directive: Yes, not on file  Patient Would Like to Complete Advance Directive: Unable  Does the patient have other document types: Do Not Resuscitate    Patient/Health Care Proxy Stated Goals: Rehabilitation    We reviewed / discussed your code status as: DNR     Full Code means perform CPR in the event of cardiac arrest.      DNR means do NOT perform CPR in the event of cardiac arrest.      Partial Code means you have specific preferences, please discuss with your healthcare team.      Rock Caprice means this issue was not addressed / resolved during your stay    Medical Interventions: Limited additional interventions  Other Instructions: While in the hospital, a Durable Do Not Resuscitate Order was completed on your behalf. This form should travel with you and should be placed on your chart at the [facility]. Once you return home, it is recommended that this form be placed in a visible location such as on the refrigerator or bedroom door. Artificially Administered Nutrition:  (not addressed)    Because of the importance of this information, we are providing you with a printed copy to share with other healthcare providers after this hospitalization is complete.

## 2018-05-03 NOTE — PROGRESS NOTES
Problem: Self Care Deficits Care Plan (Adult)  Goal: *Acute Goals and Plan of Care (Insert Text)  Occupational Therapy Goals  Initiated 5/1/2018  1. Patient will perform grooming with supervision/setup standing at the sink >5 minutes within 7 day(s). 2.  Patient will perform upper body dressing and bathing with modified independence within 7 day(s). 3.  Patient will perform lower body dressing and bathing with minimum assistance within 7 day(s). 4.  Patient will perform toilet transfers with supervision/set-up using rolling walker within 7 day(s). 5.  Patient will perform all aspects of toileting with minimal assistance within 7 day(s). 6.  Patient will participate in upper extremity therapeutic exercise/activities with modified independence for 10 minutes within 7 day(s). 7.  Patient will utilize energy conservation techniques during functional activities with verbal cues within 7 day(s). Occupational Therapy TREATMENT  Patient: Adilson Price (77 y.o. female)  Date: 5/3/2018  Diagnosis: Abdominal pain  Appendicitis Abdominal pain  Procedure(s) (LRB):  APPENDECTOMY LAPAROSCOPIC  (N/A) 3 Days Post-Op  Precautions:    Chart, occupational therapy assessment, plan of care, and goals were reviewed. ASSESSMENT: Pt just returning  from bedside commode to bed with RN and having tried to void. Pt agreeable to sit edge of bed for education as to AE for bathing and dressing. Pt is confused with questions regarding why she is here, course of events here at the hospital and decreased short term memory. During treatment pt focused on IV \"beeping\" and discomfort from IV site. RN aware and pt returned to bed with min assist for sit to supine. R UE elevated on pillow. Recommend SNF.   Progression toward goals:  []       Improving appropriately and progressing toward goals  [x]       Improving slowly and progressing toward goals  []       Not making progress toward goals and plan of care will be adjusted PLAN:  Patient continues to benefit from skilled intervention to address the above impairments. Continue treatment per established plan of care. Discharge Recommendations:  SNF  Further Equipment Recommendations for Discharge: None     SUBJECTIVE:   Patient stated I had surgery? Wilberto Jurado    OBJECTIVE DATA SUMMARY:   Cognitive/Behavioral Status:  Neurologic State: Alert  Orientation Level: Oriented to person  Cognition: Appropriate decision making             Functional Mobility and Transfers for ADLs:  Bed Mobility:  Sit to Supine: Minimum assistance;Assist x1  Scooting: Minimum assistance    Transfers:             Balance:  Standing: Impaired    ADL Intervention:     Pt educated as to AE for bathing and dressing however question carryover due to decreased STM. Pain:  Pain Scale 1: Visual  Pain Intensity 1: 0              Activity Tolerance:   Fair  Please refer to the flowsheet for vital signs taken during this treatment.   After treatment:   [] Patient left in no apparent distress sitting up in chair  [x] Patient left in no apparent distress in bed  [x] Call bell left within reach  [x] Nursing notified  [] Caregiver present  [] Bed alarm activated    COMMUNICATION/COLLABORATION:   The patients plan of care was discussed with: Occupational Therapist and Registered Nurse    MARIELA Rivera  Time Calculation: 16 mins

## 2018-05-03 NOTE — TELEPHONE ENCOUNTER
----- Message from Papo Prabhakar sent at 5/3/2018 10:57 AM EDT -----  Regarding: Dr. Yehuda Hillman () called to let Dr. Rubens Altamirano no that pt is in [address] [place] [address]. Rm 423.  Best contact [phone]

## 2018-05-03 NOTE — PROGRESS NOTES
Notified family practice of bladder scan results, 595 cc reported, verbal order for straight cath x1, 800 cc output, will discuss further interventions with am team. Will cont to monitor.

## 2018-05-03 NOTE — PROGRESS NOTES
Problem: Surgical Pathway Post-Op Day 2 through Discharge  Goal: *Adequate urinary output (equal to or greater than 30 milliliters/hour)  Outcome: Not Progressing Towards Goal  Patient not progressing in regards to voiding, straight cathed during the shift for 800ml and has not voided since.

## 2018-05-03 NOTE — PROGRESS NOTES
Cardiology Progress Note  4th floor         NAME:  Kristi Jain   :   1934   MRN:   [id number]     Assessment/Plan:   1. PAF: converted to SR overnight, pt unaware of rhythm change. ECHO essentially nml EF. Stop dilt given HR 70. Keep electrolytes repleted. CHADs 2 Vasc score 3. Pt has 3.2% risk of CVA without anticoagulation. [place] [facility] [place] if spouse agrees . 2.  Post op perforated appendix: per surgery. Pt personally seen and examined. Chart reviewed. Agree with advanced NP's history, exam and  A/P with changes/additons. Can address NOAC as OP in view of recent surgery    Will see prn. Plz call if needed. Jhonny Pulliam MD, [facility] [place] [facility]      Subjective:   Kristi Jain is a 80 y.o.   female  with PMH significant for dementia, GERD, anxiety who was admitted with abdominal pain. AP started suddenly the am of admission,  moderate, sharp, diffuse, no radiation, associated with subjective chills, nausea, vomiting x 1, generalized weakness. Patient was seen by PCP on 18, treated for Citrobacter UTI with cipro on  for 5 days. She was found to be septic  2/2 perforated appy: lap appendectomy on . This am she deveeoped a fib RVR in the 120's. Pt is poor historian and unable to give hx due to dementia, no family present. ECHO in process. TSH nml, K 3.7, Mg++ 2.2. Nml ECHO on .            Cardiac ROS: Patient denies any exertional chest pain, dyspnea, palpitations, syncope, orthopnea, edema or paroxysmal nocturnal dyspnea. Previous Cardiac Eval  ECHO: 99 Systolic function was at the lower limits of normal.  Ejection fraction was estimated in the range of 48 % to 56 %. There was  mild hypokinesis of the basal-mid inferoseptal wall(s). Tricuspid valve: There was mild regurgitation. Pericardium: A small pericardial effusion was identified circumferential  to the heart.       Review of Systems: No nausea, indigestion, vomiting, pain, cough, sputum. No bleeding. Taking po. Appetite . Tolerating PT. Objective:     Visit Vitals    /72 (BP 1 Location: Right arm, BP Patient Position: At rest)    Pulse 74    Temp 98.6 °F (37 °C)    Resp 18    Ht 5' 3\" (1.6 m)    Wt 130 lb 8.2 oz (59.2 kg)    LMP 1995    SpO2 97%    BMI 23.12 kg/m2    O2 Flow Rate (L/min): 2 l/min O2 Device: Room air    Temp (24hrs), Av.2 °F (36.8 °C), Min:97.8 °F (36.6 °C), Max:98.6 °F (37 °C)            1901 -  0700  In: 300 [P.O.:300]  Out: 9550 [Urine:1750; Drains:60]  TELE: a fib > SR 9 pm     General: AAOx1  cooperative, no acute distress. HEENT: Atraumatic. Pink and moist.  Anicteric sclerae. Neck : Supple, no thyromegaly. Lungs: CTA bilaterally. No wheezing/rhonchi/rales. Heart: Regular rhythm, no murmur, no rubs, no gallops. No JVD. No carotid bruits. Abdomen: Alexey drain, abd dsg,Soft, non-distended, non-tender. + Bowel sounds. Extremities: No edema, no clubbing, no cyanosis. No calf tenderness  Neurologic: Grossly intact. Alert and oriented X 1. No acute neurological distress. Psych: Limited insight. Not anxious or agitated.         Care Plan discussed with:    Comments   Patient x    Family   No family present   RN x    Care Manager                    Consultant:          Data Review:     No lab exists for component: ITNL   Recent Labs      18   0036  18   1547   TROIQ  <0.04  <0.04  <0.04     Recent Labs      18   0036  18   1212  18   0410  18   0402   NA  136   --   134*  136   K  3.3*   --   3.7  3.6   CL  106   --   107  106   CO2  19*   --   21  21   BUN  11   --   11  10   CREA  0.58   --   0.65  0.70   GLU  120*   --   104*  152*   PHOS  1.6*   --    --    --    MG  2.0   --   2.2  1.5*   ALB  1.7*   --   1.8*  2.1*   WBC  10.5   --   10.4  10.5   HGB  10.0*  10.5*  9.8*  11.4*   HCT  30.2*  31.8*  30.7*  34.8*   PLT  224   --   204  203     No results for input(s): INR, PTP, APTT in the last 72 hours.     No lab exists for component: INREXT    Medications reviewed  Current Facility-Administered Medications   Medication Dose Route Frequency    potassium phosphate 21 mmol in 0.9% sodium chloride 250 mL infusion   IntraVENous ONCE    potassium chloride SR (KLOR-CON 10) tablet 20 mEq  20 mEq Oral ONCE    methylnaltrexone (RELISTOR) injection 8 mg  8 mg SubCUTAneous DAILY    bisacodyl (DULCOLAX) suppository 10 mg  10 mg Rectal DAILY PRN    dextrose 5% and 0.9% NaCl infusion  75 mL/hr IntraVENous CONTINUOUS    oxyCODONE IR (ROXICODONE) tablet 5 mg  5 mg Oral Q4H PRN    dilTIAZem (CARDIZEM) IR tablet 30 mg  30 mg Oral AC&HS    acetaminophen (TYLENOL) tablet 650 mg  650 mg Oral Q6H    docusate sodium (COLACE) capsule 100 mg  100 mg Oral DAILY    heparin (porcine) injection 5,000 Units  5,000 Units SubCUTAneous Q8H    insulin lispro (HUMALOG) injection   SubCUTAneous QID WITH MEALS    pantoprazole (PROTONIX) 40 mg in sodium chloride 0.9% 10 mL injection  40 mg IntraVENous Q12H    glucose chewable tablet 16 g  4 Tab Oral PRN    dextrose (D50W) injection syrg 12.5-25 g  12.5-25 g IntraVENous PRN    glucagon (GLUCAGEN) injection 1 mg  1 mg IntraMUSCular PRN    sodium chloride (NS) flush 5-10 mL  5-10 mL IntraVENous PRN    cefepime (MAXIPIME) 2 g in 0.9% sodium chloride (MBP/ADV) 100 mL  2 g IntraVENous Q8H    sodium chloride (NS) flush 5-10 mL  5-10 mL IntraVENous Q8H    sodium chloride (NS) flush 5-10 mL  5-10 mL IntraVENous PRN    naloxone (NARCAN) injection 0.4 mg  0.4 mg IntraVENous PRN    ondansetron (ZOFRAN) injection 4 mg  4 mg IntraVENous Q6H PRN    metroNIDAZOLE (FLAGYL) IVPB premix 500 mg  500 mg IntraVENous Q12H    HYDROmorphone (DILAUDID) injection 0.5 mg  0.5 mg IntraVENous Q6H PRN         Bebo Garnett NP

## 2018-05-03 NOTE — MED STUDENT NOTES
*ATTENTION:  This note has been created by a medical student for educational purposes only. Please do not refer to the content of this note for clinical decision-making, billing, or other purposes. Please see attending physicians note to obtain clinical information on this patient. *         [facility]   Daily Progress Note    Date: 5/3/2018    Assessment/Plan:   Kristi Jain is a 80 y.o. female who is POD 3 from laparoscopic apendectomy for contained appendix perforation. She was admitted for acute abdominal pain and 4/4 SIRS concerning for sepsis with a past medical history of dementia, UTI, and GERD. Ruptured Appendix with Sepsis - POD 3 lap appendectomy, EBL 50 cc, purulent exudate was found on peritoneum, sigmoid was thickened with multiple diverticuli and no signs of perforation.  Sepsis most likely 2/2 perforated appendix, 4/4 SIRS on admission.  - Continue IVF  - Zofran prn, ibuprofen prn  - Continue dilaudid for pain control  - Surgery is following, per their recs will complete 4 days of antibiotics if patient tolerating general diet without difficulty   - Hgb 11.4--> 9.8--> 10.5  - Continue Cefepime and metronidazole (Day 4)  - Continue incentive spirometry, wound care and routine post-op care  - PT/OT recommend SNF/Rehab vs. Home Health with 24/7 care    New Onset Afib - patient had episode of Afib yesterday confirmed by EKG  - Cards is following, appreciate the recs  - Continue PO Diltiazem 30 mg per cards recs  - Continue telemetry, monitor electrolytes  - Mg 2.0, K 3.3 --> repleted   - Trending Troponin: negative x2  - pro-BNP 1757    Hyponatremia - resolved  - 134--> 136  - continue IVF   - Continue to [facility] daily    Urinary retention -  no medications at home  - Ryder catheter removed 5/02 07:00  - Bladder scan 5/03 am showed ~600 cc, straight cath yielded 800 cc urine  - Continue clean intermittent straight cath    Dementia - not on any home medications  - Continue to monitor mental status  - Frequent reorientation, lights on, limit sedating medications  - Palliative care consulted, appreciate the recs  - Family meeting 5/02, patient to be D/C to [facility], changed code status to DNR  - Case management is following    GERD - not on any medications at home, well-controlled  - Continue to monitor  - GI ppx with Protonix      FEN/GI - General diet. NS at 100 mL/hr. Activity - with assisstance  DVT prophylaxis - SCDs  GI prophylaxis -  Protonix  Disposition - Plan to d/c to SNF. CM is working on placement, accepted by [facility]. CODE STATUS:  FULL CODE    Patient discussed with Dr. Fuentes Mckenzie, [address]         CC: \"Am I at home? \"    Subjective  Patient is POD 3 from laparoscopic appendectomy and there were no acute events overnight. She had a bladder trial yesterday but was unable to void and straight cath yielded 800 cc urine. Yesterday afternoon she had a new episode of Afib and was evaluated by cardiology. This morning she denies any associated shortness of breath or chest pain. This morning patient reports she is feeling okay and slept well overnight. She reports mild tenderness but cannot localize where she feels tender. She is more confused this morning, but pleasant. Denies fevers, chills, headaches, chest pain, shortness of breath, palpitations, nausea/vomting, and LE edema.        Inpatient Medications  Current Facility-Administered Medications   Medication Dose Route Frequency    methylnaltrexone (RELISTOR) injection 8 mg  8 mg SubCUTAneous DAILY    bisacodyl (DULCOLAX) suppository 10 mg  10 mg Rectal DAILY PRN    dextrose 5% and 0.9% NaCl infusion  75 mL/hr IntraVENous CONTINUOUS    oxyCODONE IR (ROXICODONE) tablet 5 mg  5 mg Oral Q4H PRN    dilTIAZem (CARDIZEM) IR tablet 30 mg  30 mg Oral AC&HS    acetaminophen (TYLENOL) tablet 650 mg  650 mg Oral Q6H    docusate sodium (COLACE) capsule 100 mg  100 mg Oral DAILY  heparin (porcine) injection 5,000 Units  5,000 Units SubCUTAneous Q8H    insulin lispro (HUMALOG) injection   SubCUTAneous QID WITH MEALS    pantoprazole (PROTONIX) 40 mg in sodium chloride 0.9% 10 mL injection  40 mg IntraVENous Q12H    glucose chewable tablet 16 g  4 Tab Oral PRN    dextrose (D50W) injection syrg 12.5-25 g  12.5-25 g IntraVENous PRN    glucagon (GLUCAGEN) injection 1 mg  1 mg IntraMUSCular PRN    sodium chloride (NS) flush 5-10 mL  5-10 mL IntraVENous PRN    cefepime (MAXIPIME) 2 g in 0.9% sodium chloride (MBP/ADV) 100 mL  2 g IntraVENous Q8H    sodium chloride (NS) flush 5-10 mL  5-10 mL IntraVENous Q8H    sodium chloride (NS) flush 5-10 mL  5-10 mL IntraVENous PRN    naloxone (NARCAN) injection 0.4 mg  0.4 mg IntraVENous PRN    ondansetron (ZOFRAN) injection 4 mg  4 mg IntraVENous Q6H PRN    metroNIDAZOLE (FLAGYL) IVPB premix 500 mg  500 mg IntraVENous Q12H    HYDROmorphone (DILAUDID) injection 0.5 mg  0.5 mg IntraVENous Q6H PRN         Allergies  Allergies   Allergen Reactions    Macrodantin [Nitrofurantoin Macrocrystalline] Rash    Pcn [Penicillins] Rash    Sulfa (Sulfonamide Antibiotics) Rash         Objective  Vitals:  Patient Vitals for the past 8 hrs:   Temp Pulse Resp BP SpO2   05/03/18 0435 97.9 °F (36.6 °C) 73 18 127/58 98 %   05/03/18 0014 98.6 °F (37 °C) 79 17 136/60 96 %         I/O:    Intake/Output Summary (Last 24 hours) at 05/03/18 0658  Last data filed at 05/03/18 0438   Gross per 24 hour   Intake              300 ml   Output             1075 ml   Net             -775 ml     Last shift:    05/02 1901 - 05/03 0700  In: -   Out: 850 [Urine:800; Drains:50]  Last 3 shifts:    05/01 0701 - 05/02 1900  In: 700 [P.O.:300; I.V.:400]  Out: 1240 [Urine:1150; Drains:90]    Physical Exam:  General: Appears comfortably resing in bed. Alert. Oriented to self only. Not oriented to date, place, or recent events. Cooperative. Head: Normocephalic. Atraumatic.    Respiratory: Clear to auscultation bilaterally. No crackles, wheezes, or rhonchi. Cardiovascular: RRR. Normal S1,S2. No m/r/g. Pulses 2+ throughout. GI: Normoactive bowel sounds in all quadrants. Appropriately tender abdomen, most notable in suprapubic region. Soft. No guarding. Mild suprapubic distention. Incisions C/D/I, no erythema or drainage. MARCUS drain in place, dressing c/d/i, small amount serosanguinous drainage   Extremities: No edema. No tenderness. Wearing SCDs     Laboratory Data  Recent Results (from the past 12 hour(s))   GLUCOSE, POC    Collection Time: 05/02/18  9:23 PM   Result Value Ref Range    Glucose (POC) 99 65 - 100 mg/dL    Performed by Charles Jefferson (PCT)    TROPONIN I    Collection Time: 05/03/18 12:36 AM   Result Value Ref Range    Troponin-I, Qt. <0.04 <0.05 ng/mL   CBC WITH AUTOMATED DIFF    Collection Time: 05/03/18 12:36 AM   Result Value Ref Range    WBC 10.5 3.6 - 11.0 K/uL    RBC 3.39 (L) 3.80 - 5.20 M/uL    HGB 10.0 (L) 11.5 - 16.0 g/dL    HCT 30.2 (L) 35.0 - 47.0 %    MCV 89.1 80.0 - 99.0 FL    MCH 29.5 26.0 - 34.0 PG    MCHC 33.1 30.0 - 36.5 g/dL    RDW 13.0 11.5 - 14.5 %    PLATELET 489 726 - 163 K/uL    MPV 10.6 8.9 - 12.9 FL    NRBC 0.0 0  WBC    ABSOLUTE NRBC 0.00 0.00 - 0.01 K/uL    NEUTROPHILS 79 (H) 32 - 75 %    LYMPHOCYTES 14 12 - 49 %    MONOCYTES 6 5 - 13 %    EOSINOPHILS 1 0 - 7 %    BASOPHILS 0 0 - 1 %    IMMATURE GRANULOCYTES 1 (H) 0.0 - 0.5 %    ABS. NEUTROPHILS 8.3 (H) 1.8 - 8.0 K/UL    ABS. LYMPHOCYTES 1.4 0.8 - 3.5 K/UL    ABS. MONOCYTES 0.6 0.0 - 1.0 K/UL    ABS. EOSINOPHILS 0.1 0.0 - 0.4 K/UL    ABS. BASOPHILS 0.0 0.0 - 0.1 K/UL    ABS. IMM.  GRANS. 0.1 (H) 0.00 - 0.04 K/UL    DF AUTOMATED     MAGNESIUM    Collection Time: 05/03/18 12:36 AM   Result Value Ref Range    Magnesium 2.0 1.6 - 2.4 mg/dL   METABOLIC PANEL, COMPREHENSIVE    Collection Time: 05/03/18 12:36 AM   Result Value Ref Range    Sodium 136 136 - 145 mmol/L    Potassium 3.3 (L) 3.5 - 5.1 mmol/L Chloride 106 97 - 108 mmol/L    CO2 19 (L) 21 - 32 mmol/L    Anion gap 11 5 - 15 mmol/L    Glucose 120 (H) 65 - 100 mg/dL    BUN 11 6 - 20 MG/DL    Creatinine 0.58 0.55 - 1.02 MG/DL    BUN/Creatinine ratio 19 12 - 20      GFR est AA >60 >60 ml/min/1.73m2    GFR est non-AA >60 >60 ml/min/1.73m2    Calcium 7.5 (L) 8.5 - 10.1 MG/DL    Bilirubin, total 0.5 0.2 - 1.0 MG/DL    ALT (SGPT) 7 (L) 12 - 78 U/L    AST (SGOT) 16 15 - 37 U/L    Alk. phosphatase 39 (L) 45 - 117 U/L    Protein, total 4.9 (L) 6.4 - 8.2 g/dL    Albumin 1.7 (L) 3.5 - 5.0 g/dL    Globulin 3.2 2.0 - 4.0 g/dL    A-G Ratio 0.5 (L) 1.1 - 2.2     Blood Cx: NGTD for 2 days, (x2)  UCx: NGTD x 1 day  MRSA: negative    Imaging  CT abdomen/pelvis: multiple sigmoid diverticula, moderate ascites, 7 cm thickened segment of sigmoid colon, Right middle lobe 5 mm micronodule warrants future follow up  CXR: no acute process  Abd U/S: no acute process  ECHO: EF 76-78%, systolic function at the lower limits of normal, mild hypokinesis inferoseptal wall.  Mild tricuspid regurgitation    EKG: change from prior EKG, new Atrial fibrillation     Hospital Problems:  Hospital Problems  Date Reviewed: 4/11/2018          Codes Class Noted POA    Prediabetes ICD-10-CM: R73.03  ICD-9-CM: 790.29  4/30/2018 Yes        SIRS (systemic inflammatory response syndrome) ([facility] [address] ICD-10-CM: R65.10  ICD-9-CM: 995.90  4/30/2018 Yes        * (Principal)Abdominal pain ICD-10-CM: R10.9  ICD-9-CM: 789.00  4/29/2018 Yes        Dementia without behavioral disturbance ICD-10-CM: F03.90  ICD-9-CM: 294.20  5/13/2015 Yes        GERD (gastroesophageal reflux disease) ICD-10-CM: K21.9  ICD-9-CM: 530.81  4/23/2010 Yes

## 2018-05-03 NOTE — PROGRESS NOTES
[address] [facility] [place] [address]   Office [phone], Fax [phone]  Family Medicine Daily Progress Note: 5/2/2018      Assessment/Plan:   Ruddy Lopez is a 80 y.o. female with hx dementia, A-fib who is admitted for Abdominal pain and SIRS. 24hr Events: Patient was found to be in A-fib, ECHO performed, cardiology consulted and patient started on diltiazem. Sepsis 2/2 Performated Appendicitis s/p Laparoscopic repair on 4/30/18: Per Surgery, patient had peritonitis with purulent exudate covering peritoneum of RLQ, ileum, sigmoid colon.    -routine post-op care  -MARCUS drain in place, decreased output from yesterday  -Dilaudid 0.5mg Q 6hrs PRN for pain  -Surgery following, appreciate recommendations  -Will complete final doses of Cefepime and flagyl today, Per surgery patient to complete at least 4 days assuming able to tolerate diet and no leukocytosis. - blood cx no growth 4 days and urine cx no growth 1 day    New onset A-fib: On 5/2 patient found to be in A-fib on telemetry monitoring, confirmed by EKG. Troponin negative. BNP elevated at 1757.   -ECHO showed EF 48-56% with basal mid-inferolateral wall hypokinesis  -Cardiology consulted: recommended short acting diltiazem, continuing IV fluids, monitoring electrolytes   -CHADs 2 Vasc score 3. Pt has 3.2% risk of CVA without anticoagulation. Will assess fall risk and discuss with family. Hyponatremia resolved: mild, POA : 135   - Sodium 134 today  - IVF  - daily CMP    Hypomagnesemia: Magnesium low at 1.5 on 5/2/18  -daily CMP   -replete as needed     GERD: stable, not on any home meds     Right middle lobe nodule: 5mm in size  - outpatient follow up     Dementia: Mild, stable not on any home meds  -Palliative consulted. Family meeting on 5/2, decision made by family to make pt DNR. Dispo: PT recommended Rehab vs HH. Patient's family have stated preference [facility].  Will follow up with CM about placement. FEN/Diet: clear liquids  VTE prophylaxis: SCDs  Consults: Surgery  Code Status: Full  Discussed plan of care with Patient/Family   Disposition:  TBD     Subjective:   Patient continues to be forgetful about how she ended up in the hospital and her recent surgery. Reports that her abdominal pain is a little better. Review of Systems:   Review of Systems: unable to obtain due to patient's mental status    Objective:     Physical examination  Patient Vitals for the past 12 hrs:   BP Temp Pulse Resp SpO2   18 2000 133/66 97.9 °F (36.6 °C) 81 17 96 %   18 1641 134/69 97.8 °F (36.6 °C) 76 18 98 %   18 1225 127/69 98.3 °F (36.8 °C) 98 18 96 %   18 0955 - - 68 - -     Temp (24hrs), Av.9 °F (36.6 °C), Min:97.4 °F (36.3 °C), Max:98.3 °F (36.8 °C)      Intake/Output Summary (Last 24 hours) at 18  Last data filed at 18 1620   Gross per 24 hour   Intake              300 ml   Output              960 ml   Net             -660 ml      O2 Flow Rate (L/min): 2 l/min   O2 Device: Room air     General:   Alert, cooperative, no acute distress   Head:   Atraumatic   Eyes:   Conjunctivae clear   ENT:  Oral mucosa normal   Neck:  Supple, trachea midline, no adenopathy   No JVD   Chest wall:    No tenderness or deformities    Lungs:   Clear to auscultation bilaterally    Heart:   Regular rhythm, no murmur   Abdomen:    soft, distended, diffusely TTP. MARCUS drain in place with surgical bandaging. Extremities:  No edema or DVT signs   Pulses:  Symmetric all extremities   Skin:  Warm and dry    No rashes or lesions   Neurologic:  Oriented   No focal deficits   Psychiatric:  At baseline level of dementia. Oriented to person but not place or time.            Data Review:       Recent Labs      18   1212  18   0410  18   0402   18   0006   WBC   --   10.4  10.5   --   14.6*   HGB  10.5*  9.8*  11.4*   < >  12.8   HCT  31.8*  30.7*  34.8*   < >  39.2   PLT   -- 204  203   --   232    < > = values in this interval not displayed.      Recent Labs      05/02/18   0410  05/01/18   0402  04/30/18   0006   NA  134*  136  135*   K  3.7  3.6  3.6   CL  107  106  102   CO2  21  21  25   GLU  104*  152*  190*   BUN  11  10  7   CREA  0.65  0.70  0.88   CA  8.0*  8.0*  8.3*   MG  2.2  1.5*  1.6   ALB  1.8*  2.1*  2.9*   SGOT  14*  14*  14*   ALT  11*  14  21     Medications reviewed  Current Facility-Administered Medications   Medication Dose Route Frequency    methylnaltrexone (RELISTOR) injection 8 mg  8 mg SubCUTAneous DAILY    bisacodyl (DULCOLAX) suppository 10 mg  10 mg Rectal DAILY PRN    dextrose 5% and 0.9% NaCl infusion  75 mL/hr IntraVENous CONTINUOUS    oxyCODONE IR (ROXICODONE) tablet 5 mg  5 mg Oral Q4H PRN    dilTIAZem (CARDIZEM) IR tablet 30 mg  30 mg Oral AC&HS    ketorolac (TORADOL) injection 15 mg  15 mg IntraVENous Q6H    acetaminophen (TYLENOL) tablet 650 mg  650 mg Oral Q6H    docusate sodium (COLACE) capsule 100 mg  100 mg Oral DAILY    heparin (porcine) injection 5,000 Units  5,000 Units SubCUTAneous Q8H    insulin lispro (HUMALOG) injection   SubCUTAneous QID WITH MEALS    pantoprazole (PROTONIX) 40 mg in sodium chloride 0.9% 10 mL injection  40 mg IntraVENous Q12H    glucose chewable tablet 16 g  4 Tab Oral PRN    dextrose (D50W) injection syrg 12.5-25 g  12.5-25 g IntraVENous PRN    glucagon (GLUCAGEN) injection 1 mg  1 mg IntraMUSCular PRN    sodium chloride (NS) flush 5-10 mL  5-10 mL IntraVENous PRN    cefepime (MAXIPIME) 2 g in 0.9% sodium chloride (MBP/ADV) 100 mL  2 g IntraVENous Q8H    sodium chloride (NS) flush 5-10 mL  5-10 mL IntraVENous Q8H    sodium chloride (NS) flush 5-10 mL  5-10 mL IntraVENous PRN    naloxone (NARCAN) injection 0.4 mg  0.4 mg IntraVENous PRN    ondansetron (ZOFRAN) injection 4 mg  4 mg IntraVENous Q6H PRN    metroNIDAZOLE (FLAGYL) IVPB premix 500 mg  500 mg IntraVENous Q12H    HYDROmorphone (DILAUDID) injection 0.5 mg  0.5 mg IntraVENous Q6H PRN       Signed:   Fara Terrell MD   Resident, Family Medicine    Patient discussed with Dr. Willow Yap , [facility] Attending

## 2018-05-04 NOTE — TELEPHONE ENCOUNTER
Spoke with  and he said her appendix burst.  Had surgery and is now at [facility] for rehab.

## 2018-05-04 NOTE — PROGRESS NOTES
Hospital Discharge Follow-Up      Date/Time:  2018 5:56 PM    Patient was admitted to [facility] on 18 and discharged on 5/3/18 for Sepsis. The physician discharge summary was available at the time of outreach. Patient was contacted within 2 business days of discharge. Top Challenges reviewed with the provider   Patient discharged to [facility]  Patient has Dementia  Patient has new-onset A-fib  Patient has urinary retention-straight cath as needed         Method of communication with provider :face to face    Inpatient RRAT score: 13  Was this a readmission? no   Patient stated reason for the readmission: Unable to speak with patient    Nurse Navigator (NN) contacted the caregiver by telephone to perform post hospital discharge assessment. Verified name and  with caregiver as identifiers. Provided introduction to self, and explanation of the Nurse Navigator role. Outreach made to [facility] to speak with the . Spoke with Dr. Teo Lind here at [facility] and he states that he will follow up with this patient at [facility]. Recent Labs       18   0036  18   1212  18   0410  18   0402   WBC  10.5   --   10.4  10.5   HGB  10.0*  10.5*  9.8*  11.4*   HCT  30.2*  31.8*  30.7*  34.8*   PLT  224   --   204  203            Recent Labs       18   0036  18   0410  18   0402   NA  136  134*  136   K  3.3*  3.7  3.6   CL  106  107  106   CO2  19*  21  21   BUN  11  11  10   CREA  0.58  0.65  0.70   GLU  120*  104*  152*   CA  7.5*  8.0*  8.0*   MG  2.0  2.2  1.5*   PHOS  1.6*   --    --             Recent Labs       18   0036  18   0410  18   0402   SGOT  16  14*  14*   AP  39*  34*  36*   TP  4.9*  5.0*  5.2*   ALB  1.7*  1.8*  2.1*   GLOB  3.2  3.2  3.1        Summary of patients top problems:  1. Patient discharged to [facility]  2. Patient has Dementia  3. Patient has new-onset A-fib  4.  Patient has urinary retention-straight cath as needed    Home Health orders at discharge: Patient was discharged to [facility]: N/A  Date of initial visit: N/A    Advance Care Planning:   Does patient have an Advance Directive:  not on file     Medication:     New Medications at Discharge: Eliquis 2.5 mg 1 tablet 2 times a day  Changed Medications at Discharge: None  Discontinued Medications at Discharge: None    Referral to Pharm D needed: no     Current Outpatient Prescriptions   Medication Sig    acetaminophen (TYLENOL) 325 mg tablet Take 2 Tabs by mouth every six (6) hours as needed for Pain for up to 30 days.  docusate sodium (COLACE) 100 mg capsule Take 1 Cap by mouth two (2) times daily as needed for Constipation for up to 90 days.  levoFLOXacin (LEVAQUIN) 750 mg tablet Take 1 Tab by mouth daily for 9 days.  metroNIDAZOLE (FLAGYL) 500 mg tablet Take 1 Tab by mouth three (3) times daily for 9 days.  oxyCODONE IR (ROXICODONE) 5 mg immediate release tablet Take 1 Tab by mouth every four (4) hours as needed. Max Daily Amount: 30 mg.    apixaban (ELIQUIS) 2.5 mg tablet Take 1 Tab by mouth two (2) times a day.  cranberry extract 450 mg tab tablet Take 450 mg by mouth daily. No current facility-administered medications for this visit. There are no discontinued medications.     PCP/Specialist follow up: Future Appointments  Date Time Provider Chase Fernandez   5/7/2018 10:50 AM Damaris Torres MD [facility] [address]

## 2018-05-11 PROBLEM — K65.1 INTRA-ABDOMINAL ABSCESS (HCC): Status: ACTIVE | Noted: 2018-01-01

## 2018-05-11 NOTE — ROUTINE PROCESS
TRANSFER - OUT REPORT:    Verbal report given to Rhett Ren RN(name) on Shade Juares  being transferred to 4th Floor(unit) for routine progression of care       Report consisted of patients Situation, Background, Assessment and   Recommendations(SBAR). Information from the following report(s) ED Summary was reviewed with the receiving nurse. Lines:   Peripheral IV 05/11/18 Left Forearm (Active)   Site Assessment Clean, dry, & intact 5/11/2018  9:48 AM   Phlebitis Assessment 0 5/11/2018  9:48 AM   Infiltration Assessment 0 5/11/2018  9:48 AM   Dressing Status Clean, dry, & intact 5/11/2018  9:48 AM        Opportunity for questions and clarification was provided.       Patient transported with:   [facility]

## 2018-05-11 NOTE — PROGRESS NOTES
Primary Nurse Leelee Avery and Sergey Jenkins, RN performed a dual skin assessment on this patient No impairment noted  Dimitris score is 17

## 2018-05-11 NOTE — PROGRESS NOTES
Reason for Readmission:   Abdominal pain, Intra abdomina abscess    4/29 [facility] for sepsis     RRAT Score and Risk Level:  15, Yellow      Level of Readmission:  moderate        Care Conference scheduled:  no       Resources/supports as identified by patient/family:   Pt spouse, Юлия Kub [phone], adequate family support,        Top Challenges facing patient (as identified by patient/family and CM):   Pt. To ED arrived via EMS from [address] [place] [address] Pt's was previously living with her spouse, family is concerned with the pt going back to live at home again when she has finished her stay at  [facility]. Finances/Medication cost?  Pt has prescription coverage, Medicare A&B,     Transportation   Pt spouse and family, pt arrived from [facility] via EMS     Support system or lack thereof? Pt has adequate family support     Living arrangements? Pt. plans to return to [facility]        Self-care/ADLs/Cognition? Pt requires full assistance with ADL\"S          Current Advanced Directive/Advance Care Plan: No            Plan for utilizing home health: TBD, Pt to return to SNF               Likelihood of additional readmission:  Moderate               Transition of Care Plan:    Based on readmission, the patient's previous Plan of Care has been evaluated and/or modified. The current Transition of Care Plan is: CM met with Pt and family at bedside. Pt. wishes to return to [facility]. Pt. spouse reports the pt has previously been living in a single level home with a few entry steps. Pt. and family would like to the pt to eventually return home if able from the SNF. Pt spouse and family can provide transportation. No CM consult at this time, CM to follow for any further needs. PCP Shanta Gabriel [facility] [address] Management Interventions  PCP Verified by CM:  Yes  Mode of Transport at Discharge: BLS  Transition of Care Consult (CM Consult): Discharge Planning  Physical Therapy Consult: No  Occupational Therapy Consult: No  Current Support Network: Nursing Facility  Confirm Follow Up Transport: Other (see comment) (TBD)  Plan discussed with Pt/Family/Caregiver: Yes  Discharge Location  Discharge Placement: Skilled nursing facility

## 2018-05-11 NOTE — PROGRESS NOTES
[facility]: MED RECONCILIATION    Comments/Recommendations:   PTA list updated from the paperwork from [facility] called the facility and spoke with the patient's nurse. The patient received all of her scheduled medication this morning prior to departure but no as needed medications. On 5/7/18 apixaban was stopped due to significant blood noted in the patient's briefs. The bleeding last for approximately 24 hours and the nurses were unable to determine if the blood was coming from the vagina or rectum. The patient had been started on apixaban at discharge on 5/3/18 for new onset atrial fibrillation. Drug drug interaction noted between quetiapine and levofloxacin as increased risk for Qtc prolongation. Medications added:     · Buspirone  · Furosemide  · Seroquel    Medications removed:    · Cranberry  · Apixaban    Medications adjusted:    · None      Allergies: Macrodantin [nitrofurantoin macrocrystalline]; Pcn [penicillins]; and Sulfa (sulfonamide antibiotics)    Prior to Admission Medications:   Prior to Admission Medications   Prescriptions Last Dose Informant Patient Reported? Taking? QUEtiapine (SEROQUEL) 25 mg tablet 5/10/2018 at Unknown time Transfer Papers Yes Yes   Sig: Take 25 mg by mouth nightly. acetaminophen (TYLENOL) 325 mg tablet  Transfer Papers No Yes   Sig: Take 2 Tabs by mouth every six (6) hours as needed for Pain for up to 30 days. busPIRone (BUSPAR) 5 mg tablet 5/11/2018 at am Transfer Papers Yes Yes   Sig: Take 5 mg by mouth three (3) times daily (with meals). docusate sodium (COLACE) 100 mg capsule  Transfer Papers No Yes   Sig: Take 1 Cap by mouth two (2) times daily as needed for Constipation for up to 90 days. furosemide (LASIX) 20 mg tablet 5/11/2018 at Unknown time Transfer Papers Yes Yes   Sig: Take 20 mg by mouth daily. Indications: Edema   levoFLOXacin (LEVAQUIN) 750 mg tablet 5/11/2018 at am Transfer Papers No Yes   Sig: Take 1 Tab by mouth daily for 9 days. metroNIDAZOLE (FLAGYL) 500 mg tablet 5/11/2018 at am Transfer Papers No Yes   Sig: Take 1 Tab by mouth three (3) times daily for 9 days. oxyCODONE IR (ROXICODONE) 5 mg immediate release tablet  Transfer Papers No Yes   Sig: Take 1 Tab by mouth every four (4) hours as needed. Max Daily Amount: 30 mg.       Facility-Administered Medications: None     Thank you,      Shania Simmons, PharmD, BCPS

## 2018-05-11 NOTE — CONSULTS
[address]   Initial Consult Note    Patient:  Garth Parker  MRN:  [id number]  YOB: 1934  Age:  80 y.o. Primary care provider:  Victor Manuel Collado MD    Date of admission:  5/11/2018    Date of consultation:  5/11/2018    Requesting physician:  Belinda Arriaga    Reason for consultation:  Medical managment                                History of present illness  The Family Medicine Service was consulted to see Garth Parker who is a 80 y.o. female history of Dementia, GERD and UTI, perforated appendicitis s/p laparoscopic repair on 04/30 and Afib admitted under surgery service for post op intraabdominal abscesses. Patient is demented and poor historian but per family patient had increased abdominal pain that prompted presentation to the ER. CT of abdomen showed small intraabdominal abscesses and small amount of free air. Home Medications   Prior to Admission medications    Medication Sig Start Date End Date Taking? Authorizing Provider   busPIRone (BUSPAR) 5 mg tablet Take 5 mg by mouth three (3) times daily (with meals). Yes Historical Provider   furosemide (LASIX) 20 mg tablet Take 20 mg by mouth daily. Indications: Edema   Yes Historical Provider   QUEtiapine (SEROQUEL) 25 mg tablet Take 25 mg by mouth nightly. Yes Historical Provider   acetaminophen (TYLENOL) 325 mg tablet Take 2 Tabs by mouth every six (6) hours as needed for Pain for up to 30 days. 5/3/18 6/2/18 Yes Gaye Sibley MD   docusate sodium (COLACE) 100 mg capsule Take 1 Cap by mouth two (2) times daily as needed for Constipation for up to 90 days. 5/3/18 8/1/18 Yes Gaye Sibley MD   levoFLOXacin (LEVAQUIN) 750 mg tablet Take 1 Tab by mouth daily for 9 days. 5/3/18 5/12/18 Yes Gaye Sibley MD   metroNIDAZOLE (FLAGYL) 500 mg tablet Take 1 Tab by mouth three (3) times daily for 9 days.  5/3/18 5/12/18 Yes Gaye Sibley MD   oxyCODONE IR (ROXICODONE) 5 mg immediate release tablet Take 1 Tab by mouth every four (4) hours as needed. Max Daily Amount: 30 mg. 5/3/18  Yes Cristiano Wheeler MD         Allergies   Allergies   Allergen Reactions    Macrodantin [Nitrofurantoin Macrocrystalline] Rash    Pcn [Penicillins] Rash    Sulfa (Sulfonamide Antibiotics) Rash         Past Medical History   Past Medical History:   Diagnosis Date    Alzheimer disease     Anxiety 4/23/2010    GERD (gastroesophageal reflux disease) 4/23/2010    Mild dementia     UTI (lower urinary tract infection) 4/23/2010         Past Surgical History  Past Surgical History:   Procedure Laterality Date    ENDOSCOPY, COLON, DIAGNOSTIC      HX CATARACT REMOVAL  5/2011    left    HX UROLOGICAL      bladder uplift         Family History  Family History   Problem Relation Age of Onset    Cancer Father          Social History   Social History     Social History    Marital status:      Spouse name: N/A    Number of children: N/A    Years of education: N/A     Occupational History    Not on file. Social History Main Topics    Smoking status: Never Smoker    Smokeless tobacco: Never Used    Alcohol use No    Drug use: No    Sexual activity: Not Currently     Other Topics Concern    Not on file     Social History Narrative       History   Alcohol Use No       History   Smoking Status    Never Smoker   Smokeless Tobacco    Never Used       History   Drug Use No       History   Sexual Activity    Sexual activity: Not Currently       Code status    Full code   x  DNR/DNI     Partial    Code status discussed with the patient/caregivers. Review of Systems  Gen: Fevers, Chills,  Fatigue, Night sweats. Head: Headache, Trauma. ENT: Blurry vision, Diplopia, Rhinorrhea, Congestion, Dysphagia, Odynophagia. Cardiovascular: Chest pain, Palpitations. Respiratory: Dyspnea, Cough. GI: Abdominal pain, Nausea, Vomiting, Melena, Change in stools.   : dysuria, Hematuria  MSK: Myalgias, Joint stiffness  Neuro: Numbness, Tingling, Seizures. Psych: Depression, Anxiety    Physical Exam  Visit Vitals    /76 (BP 1 Location: Right arm, BP Patient Position: At rest)    Pulse 80    Temp 98 °F (36.7 °C)    Resp 16    Ht 5' 3\" (1.6 m)    Wt 130 lb (59 kg)    LMP 01/31/1995    SpO2 93%    BMI 23.03 kg/m2        General: No acute distress. Alert. Cooperative. Head: Normocephalic. Atraumatic. Respiratory: CTAB. No w/r/r/c.   Cardiovascular: RRR. Normal S1,S2. No m/r/g. Pulses 2+ throughout. GI: + bowel sounds. Tender, laparoscopic incision site dry and intact. Extremities: Mild edema, blisters noted on the dorsal surface of bilateral foot. Pulses 2+  . Laboratory Data  Recent Results (from the past 24 hour(s))   CBC WITH AUTOMATED DIFF    Collection Time: 05/11/18  9:35 AM   Result Value Ref Range    WBC 12.0 (H) 3.6 - 11.0 K/uL    RBC 4.06 3.80 - 5.20 M/uL    HGB 11.7 11.5 - 16.0 g/dL    HCT 36.8 35.0 - 47.0 %    MCV 90.6 80.0 - 99.0 FL    MCH 28.8 26.0 - 34.0 PG    MCHC 31.8 30.0 - 36.5 g/dL    RDW 13.6 11.5 - 14.5 %    PLATELET 733 (H) 580 - 400 K/uL    MPV 9.7 8.9 - 12.9 FL    NRBC 0.0 0  WBC    ABSOLUTE NRBC 0.00 0.00 - 0.01 K/uL    NEUTROPHILS 77 (H) 32 - 75 %    LYMPHOCYTES 13 12 - 49 %    MONOCYTES 8 5 - 13 %    EOSINOPHILS 0 0 - 7 %    BASOPHILS 0 0 - 1 %    IMMATURE GRANULOCYTES 1 (H) 0.0 - 0.5 %    ABS. NEUTROPHILS 9.2 (H) 1.8 - 8.0 K/UL    ABS. LYMPHOCYTES 1.6 0.8 - 3.5 K/UL    ABS. MONOCYTES 1.0 0.0 - 1.0 K/UL    ABS. EOSINOPHILS 0.0 0.0 - 0.4 K/UL    ABS. BASOPHILS 0.1 0.0 - 0.1 K/UL    ABS. IMM.  GRANS. 0.1 (H) 0.00 - 0.04 K/UL    DF AUTOMATED     METABOLIC PANEL, COMPREHENSIVE    Collection Time: 05/11/18  9:35 AM   Result Value Ref Range    Sodium 136 136 - 145 mmol/L    Potassium 3.4 (L) 3.5 - 5.1 mmol/L    Chloride 99 97 - 108 mmol/L    CO2 28 21 - 32 mmol/L    Anion gap 9 5 - 15 mmol/L    Glucose 118 (H) 65 - 100 mg/dL    BUN 4 (L) 6 - 20 MG/DL    Creatinine 0.59 0.55 - 1.02 MG/DL    BUN/Creatinine ratio 7 (L) 12 - 20      GFR est AA >60 >60 ml/min/1.73m2    GFR est non-AA >60 >60 ml/min/1.73m2    Calcium 8.7 8.5 - 10.1 MG/DL    Bilirubin, total 0.4 0.2 - 1.0 MG/DL    ALT (SGPT) 14 12 - 78 U/L    AST (SGOT) 20 15 - 37 U/L    Alk.  phosphatase 60 45 - 117 U/L    Protein, total 6.4 6.4 - 8.2 g/dL    Albumin 2.4 (L) 3.5 - 5.0 g/dL    Globulin 4.0 2.0 - 4.0 g/dL    A-G Ratio 0.6 (L) 1.1 - 2.2     LACTIC ACID    Collection Time: 05/11/18  9:35 AM   Result Value Ref Range    Lactic acid 1.1 0.4 - 2.0 MMOL/L   TROPONIN I    Collection Time: 05/11/18  9:35 AM   Result Value Ref Range    Troponin-I, Qt. <0.04 <0.05 ng/mL   URINALYSIS W/MICROSCOPIC    Collection Time: 05/11/18  9:51 AM   Result Value Ref Range    Color YELLOW/STRAW      Appearance CLOUDY (A) CLEAR      Specific gravity 1.008 1.003 - 1.030      pH (UA) 7.5 5.0 - 8.0      Protein NEGATIVE  NEG mg/dL    Glucose NEGATIVE  NEG mg/dL    Ketone TRACE (A) NEG mg/dL    Bilirubin NEGATIVE  NEG      Blood MODERATE (A) NEG      Urobilinogen 0.2 0.2 - 1.0 EU/dL    Nitrites NEGATIVE  NEG      Leukocyte Esterase SMALL (A) NEG      WBC 20-50 0 - 4 /hpf    RBC 5-10 0 - 5 /hpf    Epithelial cells FEW FEW /lpf    Bacteria NEGATIVE  NEG /hpf    Yeast w/hyphae PRESENT (A) NEG     EKG, 12 LEAD, INITIAL    Collection Time: 05/11/18 10:02 AM   Result Value Ref Range    Ventricular Rate 84 BPM    Atrial Rate 84 BPM    P-R Interval 156 ms    QRS Duration 74 ms    Q-T Interval 386 ms    QTC Calculation (Bezet) 456 ms    Calculated P Axis -4 degrees    Calculated R Axis -27 degrees    Calculated T Axis 7 degrees    Diagnosis       Normal sinus rhythm  Minimal voltage criteria for LVH, may be normal variant  Septal infarct , age undetermined  Abnormal ECG  When compared with ECG of 02-MAY-2018 12:54,  Sinus rhythm has replaced Atrial fibrillation  Septal infarct is now present  Confirmed by [id number], Cornelius OTTO, Musa Pineda [id number]) on 5/11/2018 12:50:55 PM         Imaging  EXAM:  CT ABD PELV W CONT     INDICATION: abdominal pain; distension     COMPARISON: 2018     CONTRAST:  95 mL of Isovue-370.     TECHNIQUE:   Following the uneventful intravenous administration of contrast, thin axial  images were obtained through the abdomen and pelvis. Coronal and sagittal  reconstructions were generated. Oral contrast was not administered. CT dose  reduction was achieved through use of a standardized protocol tailored for this  examination and automatic exposure control for dose modulation.     FINDINGS: Report delayed by slowness of PACS  LUNG BASES: There is increasing bibasilar atelectasis/effusions right greater  than left. INCIDENTALLY IMAGED HEART AND MEDIASTINUM: There is mild cardiomegaly. . There is  a pericardial effusion measuring 8 mm posteriorly. LIVER: No mass or biliary dilatation. GALLBLADDER: Unremarkable. SPLEEN: No mass. PANCREAS: No mass or ductal dilatation. ADRENALS: Unremarkable. KIDNEYS: No mass, calculus, or hydronephrosis. STOMACH: Unremarkable. SMALL BOWEL: There is a again loop of small bowel in the pelvis with  inflammation in the adjacent mesentery. There are areas of free air adjacent to  the small bowel loop in the pelvis. There is free air in the upper abdomen. There is a low-density collections subhepatic space measuring 3 x 1.8 cm.     There is a collection in the right mid abdomen anteriorly measuring 2.3 x 1.1  cm. There is suspicion of a collection in the left upper pelvis posteriorly  measuring 2.9 x 1.8 cm. There is a tiny collection in the mid pelvis measuring 1  cm versus a tiny sinus tract. There is a 6 mm low-density in the region of the  right lower quadrant where the appendix was removed.     COLON: There is diverticulosis. .  APPENDIX: Surgically absent  PERITONEUM: There is pneumoperitoneum  REPRODUCTIVE ORGANS: There is a 3.4 x 2 cm low-density in the uterus     URINARY BLADDER: No mass or calculus. BONES: No destructive bone lesion. ADDITIONAL COMMENTS: N/A     IMPRESSION  IMPRESSION:     1. There is free air. 2. There is inflammatory process in the pelvis which seems to be centered around  a small bowel loop where there is localized extraluminal gas and mild bowel wall  thickening. There are a few interloop fluid collections suspicious for small  abscesses as described above. Report is been called to ER. 3. There is a 3.4 x 2.0 cm low-density in the uterus which further assessment is  recommended.         EKG:  Normal sinus rhythm ventricular 84      Impression/Recomendations   Shade Juares is a 80 y.o. female  history of Dementia, GERD and UTI, perforated appendicitis s/p laparoscopic repair on 04/30 and Afib admitted under surgery service for post op intraabdominal abscesses. We will kindly sign off as patient is not currently on scheduled medications. Seroquel and Buspar started at [facility] after she was recently discharged on 05/03/2018. Patient was sent home with Eliquis for newly diagnosed  but stopped on 05/07 on blood noted at nursing home. Patient normal sinus rhythm now. Primary team could consider consulting Cardiology if needed for Afib as patient wasn't able to tolerate Eliquis. Please reconsult for any questions or concerns. Patient s/p Lap repair of perforated appendicitis on April 30 2018 now with free air in the abdomen and intraabdominal abscesses  - NPO, Abx, IVFs and pain management  Per General surgery    Afib: Newly diagnosed Afib on 05/02 while patient was admitted. Echo showed 40494% with basal mid inferolateral wall hypokinesis. [id number] score of 3. Patient was discharged with Eliquis. Per pharmacy mad recs eliquis is stopped on 05/07 on blood noted in the patient's briefs. the bleeding lasted 24 hours with unknown source from vagina or rectum.  Patient now in - Normal sinus rhythm  - Hold Eliquis now per surgery recs      Urinary retention - went to SNF with instructions to straight cath prn. Ryder in place now given full clinical picture and presented with urinary retention  - Continue to monitor    Dementia - seen by palliative previous admission  - continue to monitor  - Seroquel and buspirone re-started at [facility], discussed with pharmacy that these medications not recommended given age of the patient. Code status : DNR: Confirmed with family. Advance care plan on file. FEN/GI - per primary. Activity - per primary. DVT prophylaxis - per primary. GI prophylaxis - per primary. Disposition - per primary. Thank you very much for allowing us to participate in the care of this pleasant patient. The family medicine service will continue to follow the patient's medical progress along with you. Please do not hesitate to page with any questions or to discuss the case.      Signed by: Marshal Javed MD  Resident, PGY-1    May 11, 2018

## 2018-05-11 NOTE — H&P
Surgery History and Physical    Subjective:      Lin Malone is a 80 y.o. female who presented to the ED from [address] [place] [address] with c/o abdominal pain. She is s/p lap appendectomy for perforated appendicitis 4/30 and was discharged 5/3 to SNF. History is challenging d/t patient's dementia. Per her family she started having increased abdominal pain over the last few days and stopped eating. No fever or chills reported. Unclear when her last BM was. CT shows several small intra-abdominal abscesses and small amount of free air.      Past Medical History:   Diagnosis Date    Alzheimer disease     Anxiety 4/23/2010    GERD (gastroesophageal reflux disease) 4/23/2010    Mild dementia     UTI (lower urinary tract infection) 4/23/2010     Past Surgical History:   Procedure Laterality Date    ENDOSCOPY, COLON, DIAGNOSTIC      HX CATARACT REMOVAL  5/2011    left    HX UROLOGICAL      bladder uplift      Family History   Problem Relation Age of Onset    Cancer Father      Social History     Social History    Marital status:      Spouse name: N/A    Number of children: N/A    Years of education: N/A     Social History Main Topics    Smoking status: Never Smoker    Smokeless tobacco: Never Used    Alcohol use No    Drug use: No    Sexual activity: Not Currently     Other Topics Concern    None     Social History Narrative      Current Facility-Administered Medications   Medication Dose Route Frequency    cefOXitin (MEFOXIN) 2 g in 0.9% sodium chloride (MBP/ADV) 50 mL  2 g IntraVENous Q8H    sodium chloride (NS) flush 5-10 mL  5-10 mL IntraVENous Q8H    sodium chloride (NS) flush 5-10 mL  5-10 mL IntraVENous PRN    HYDROmorphone (DILAUDID) injection 0.5 mg  0.5 mg IntraVENous Q4H PRN    naloxone (NARCAN) injection 0.4 mg  0.4 mg IntraVENous PRN    ondansetron (ZOFRAN) injection 4 mg  4 mg IntraVENous Q4H PRN    diphenhydrAMINE (BENADRYL) injection 12.5 mg  12.5 mg IntraVENous ONCE PRN    docusate sodium (COLACE) capsule 100 mg  100 mg Oral BID    dextrose 5% - 0.45% NaCl with KCl 20 mEq/L infusion  100 mL/hr IntraVENous CONTINUOUS    [START ON 5/12/2018] enoxaparin (LOVENOX) injection 40 mg  40 mg SubCUTAneous Q24H    metroNIDAZOLE (FLAGYL) IVPB premix 500 mg  500 mg IntraVENous Q8H     Current Outpatient Prescriptions   Medication Sig    busPIRone (BUSPAR) 5 mg tablet Take 5 mg by mouth three (3) times daily (with meals).  furosemide (LASIX) 20 mg tablet Take 20 mg by mouth daily. Indications: Edema    QUEtiapine (SEROQUEL) 25 mg tablet Take 25 mg by mouth nightly.  acetaminophen (TYLENOL) 325 mg tablet Take 2 Tabs by mouth every six (6) hours as needed for Pain for up to 30 days.  docusate sodium (COLACE) 100 mg capsule Take 1 Cap by mouth two (2) times daily as needed for Constipation for up to 90 days.  levoFLOXacin (LEVAQUIN) 750 mg tablet Take 1 Tab by mouth daily for 9 days.  metroNIDAZOLE (FLAGYL) 500 mg tablet Take 1 Tab by mouth three (3) times daily for 9 days.  oxyCODONE IR (ROXICODONE) 5 mg immediate release tablet Take 1 Tab by mouth every four (4) hours as needed. Max Daily Amount: 30 mg.       Allergies   Allergen Reactions    Macrodantin [Nitrofurantoin Macrocrystalline] Rash    Pcn [Penicillins] Rash    Sulfa (Sulfonamide Antibiotics) Rash       Review of Systems:REVIEW OF SYSTEMS:     []     Unable to obtain  ROS due to  []    mental status change  []    sedated   []    intubated   []    Total of 12 systems reviewed as follows:    Constitutional: + anorexia, neg for fevers, chills, weight loss, malaise  Eyes: negative for blurry vision  Ears, nose, mouth, throat, and face: negative for sore throat  Respiratory: negative for SOB  Cardiovascular: negative for CP  Gastrointestinal: + abdominal pain, negative for nausea, vomiting, diarrhea, constipation, melena, hematochezia  Genitourinary: negative for dysuria  Integument/breast: neg for skin rash  Hematologic/lymphatic: neg for bruising  Musculoskeletal: negative for muscle aches  Neurological: no dizziness or h/a    Objective:      Patient Vitals for the past 8 hrs:   BP Temp Pulse Resp SpO2 Height Weight   18 1400 149/76 - - - 93 % - -   18 1300 143/58 - - - 95 % - -   18 1000 156/63 - - - 96 % - -   18 0945 140/77 - - - 95 % - -   18 0906 161/74 98.7 °F (37.1 °C) 87 14 96 % 5' 3\" (1.6 m) 59 kg (130 lb)       Temp (24hrs), Av.7 °F (37.1 °C), Min:98.7 °F (37.1 °C), Max:98.7 °F (37.1 °C)      Physical Exam:  General:  Alert, confused, NAD   Eyes:  Conjunctivae/corneas clear. Nose: Nares normal. Septum midline   Mouth/Throat: Lips, mucosa, and tongue normal.    Neck: Supple, symmetrical, trachea midline   Lungs:   Clear to auscultation bilaterally. Heart:  Regular rate and rhythm   Abdomen:   Soft, tender lower abdomen, mildly distended, incision well healed    Extremities: Extremities normal, atraumatic, no cyanosis or edema. Skin: Skin color, texture, turgor normal. No rashes or lesions   Neuro: Alert, sidoriented, speech clear     Labs: Recent Labs      18   0935   WBC  12.0*   HGB  11.7   HCT  36.8   PLT  429*     Recent Labs      18   0935   NA  136   K  3.4*   CL  99   CO2  28   GLU  118*   BUN  4*   CREA  0.59   CA  8.7   ALB  2.4*   TBILI  0.4   SGOT  20   ALT  14     No results for input(s): INR in the last 72 hours. No lab exists for component: INREXT      Assessment and Plan:     Post-op intra-abdominal abscesses    Collections too small for IR drainage. Plan for admission, bowel rest, IV ABX. No indication for surgery at this time. [facility] consult for medical management. Hold Eliquis.        Signed By: KALI Petty     May 11, 2018

## 2018-05-11 NOTE — PROGRESS NOTES
Bedside shift change report given to Riana Looney (oncoming nurse) by Angella Philip (offgoing nurse). Report included the following information SBAR, Kardex, Intake/Output and MAR.

## 2018-05-11 NOTE — ED PROVIDER NOTES
HPI Comments: 80 y.o. female with past medical history significant for UTI, anxiety, GERD, and Alzheimer's dementia who presents from [facility] via EMS with chief complaint of abdominal pain. History is somewhat limited since the patient is a poor historian. Patient states onset of moderate generalized abdominal pain with accompanying distention for an unspecified time. There are no other acute medical concerns at this time. Old Chart Review: Per note, patient was admitted here from 04/29/18 through 05/03/18 for abdominal pain, nausea, vomiting, and generalized weakness. She had sepsis secondary to perforated appendix s/p laparoscopic repair on 04/30/18. Social hx: Tobacco Use: No, Alcohol Use: No, Drug Use: No    PCP: Gaylia Olszewski, MD    Note written by Manuel Marie, as dictated by KALI Garcia 9:06 AM    Full history, physical exam, and ROS unable to be obtained due to:  dementia. The history is provided by the patient and medical records. Past Medical History:   Diagnosis Date    Alzheimer disease     Anxiety 4/23/2010    GERD (gastroesophageal reflux disease) 4/23/2010    Mild dementia     UTI (lower urinary tract infection) 4/23/2010       Past Surgical History:   Procedure Laterality Date    ENDOSCOPY, COLON, DIAGNOSTIC      HX CATARACT REMOVAL  5/2011    left    HX UROLOGICAL      bladder uplift         Family History:   Problem Relation Age of Onset    Cancer Father        Social History     Social History    Marital status:      Spouse name: N/A    Number of children: N/A    Years of education: N/A     Occupational History    Not on file. Social History Main Topics    Smoking status: Never Smoker    Smokeless tobacco: Never Used    Alcohol use No    Drug use: No    Sexual activity: Not Currently     Other Topics Concern    Not on file     Social History Narrative         ALLERGIES: Macrodantin [nitrofurantoin macrocrystalline];  Pcn [penicillins]; and Sulfa (sulfonamide antibiotics)    Review of Systems   Unable to perform ROS: Dementia   Constitutional: Negative for activity change, appetite change, diaphoresis and fever. HENT: Negative for ear discharge, ear pain, facial swelling, rhinorrhea, sore throat, tinnitus, trouble swallowing and voice change. Eyes: Negative for photophobia, pain, discharge, redness and visual disturbance. Respiratory: Negative for cough, chest tightness, shortness of breath, wheezing and stridor. Cardiovascular: Negative for chest pain and palpitations. Gastrointestinal: Positive for abdominal pain. Negative for constipation, diarrhea, nausea and vomiting. Endocrine: Negative for polydipsia and polyuria. Genitourinary: Negative for dysuria, flank pain and hematuria. Musculoskeletal: Negative for arthralgias, back pain and myalgias. Skin: Negative for color change and rash. Neurological: Negative for dizziness, syncope, speech difficulty, light-headedness and numbness. Psychiatric/Behavioral: Negative for behavioral problems. Vitals:    05/11/18 0906   BP: 161/74   Pulse: 87   Resp: 14   Temp: 98.7 °F (37.1 °C)   SpO2: 96%   Weight: 59 kg (130 lb)   Height: 5' 3\" (1.6 m)            Physical Exam   Constitutional:   Pt is elderly and chronically ill appearing. HENT:   Head: Normocephalic and atraumatic. Eyes: Conjunctivae are normal. Pupils are equal, round, and reactive to light. Right eye exhibits no discharge. Left eye exhibits no discharge. Neck: Normal range of motion. Neck supple. No thyromegaly present. Cardiovascular: Normal rate, regular rhythm and normal heart sounds. Exam reveals no gallop and no friction rub. No murmur heard. Pulmonary/Chest: Effort normal and breath sounds normal. No respiratory distress. She has no wheezes. Abdominal: Soft. Bowel sounds are normal. She exhibits no distension. There is tenderness. There is no rebound.    Abdomen diffusely tender to palpation. Musculoskeletal: Normal range of motion. Neurological: She is alert. Skin: Skin is warm. Psychiatric: She has a normal mood and affect. MDM  Number of Diagnoses or Management Options  Intra-abdominal abscess [facility]):   Diagnosis management comments: CT revealed intra-abdominal abscess. Will start IV abx and admit to surgery. Reviewed treatment plan with attending and they agree. Laith Dsouza        ED Course       Procedures    ED EKG interpretation:  Rhythm: normal sinus rhythm; and regular . Rate (approx.): 84; Axis: normal. Minimal voltage criteria for LVH. May be normal variant; ST/T wave: normal; Septal infarct, age undetermined.    Note written by Manuel Palomo, as dictated by KALI Fierro 10:30 AM

## 2018-05-12 NOTE — PROGRESS NOTES
Assessment / Plan:     Addendum:  Patient is complaining of anxiety and screaming out. Ordered sitter. May need FM consult if not helpful. Development of intra-abdominal abscess after perforated appendectomy. Likely unable to access percutaneously  Continue IV antibiotic trial for now. May need re-imaging to see progression. Continue denise for urinary retention. Continue meds for alzheimer's and dementia. Irvin Moyer MD  [facility]  Office:  [phone]        General Surgery Daily Progress Note      Patient: Gayle Angel MRN: [id number]  SSN: [id number]    YOB: 1934  Age: 80 y.o. Sex: female      Admit Date: 5/11/2018 for abdominal pain    Subjective:   Patient is confused. She does report abdominal pain and wanting to void, which she forgets in five minutes. Nursing reports typical confusion. Current Facility-Administered Medications   Medication Dose Route Frequency    cefOXitin (MEFOXIN) 2 g in 0.9% sodium chloride (MBP/ADV) 50 mL  2 g IntraVENous Q8H    sodium chloride (NS) flush 5-10 mL  5-10 mL IntraVENous Q8H    sodium chloride (NS) flush 5-10 mL  5-10 mL IntraVENous PRN    HYDROmorphone (DILAUDID) injection 0.5 mg  0.5 mg IntraVENous Q4H PRN    naloxone (NARCAN) injection 0.4 mg  0.4 mg IntraVENous PRN    ondansetron (ZOFRAN) injection 4 mg  4 mg IntraVENous Q4H PRN    docusate sodium (COLACE) capsule 100 mg  100 mg Oral BID    dextrose 5% - 0.45% NaCl with KCl 20 mEq/L infusion  100 mL/hr IntraVENous CONTINUOUS    enoxaparin (LOVENOX) injection 40 mg  40 mg SubCUTAneous Q24H        Objective:      05/10 1901 - 05/12 0700  In: 643.3 [I.V.:643.3]  Out: 2650 [Urine:2650]  Patient Vitals for the past 8 hrs:   BP Temp Pulse Resp SpO2   05/12/18 0737 148/73 98.5 °F (36.9 °C) 87 17 96 %   05/12/18 0659 - - 81 - -       Physical Exam:  General: Alert, cooperative, no distress, appears stated age.   Neck:  Supple, symmetrical, trachea midline, no adenopathy, thyroid: no                           enlargement/tenderness/nodules, no carotid bruit and no JVD. Lungs: Clear to auscultation bilaterally. Heart:  Regular rate and rhythm, S1, S2 normal, no murmur, click, rub or gallop. Abdomen: Soft, tender. Mildly distended. Bowel sounds normal. No masses,  No organomegaly. Extremities: Extremities normal, atraumatic, no cyanosis or edema.   Skin:  Skin color, texture, turgor normal. No rashes or lesions    Labs: Recent Labs      05/12/18   0240   WBC  18.2*   HGB  11.1*   HCT  34.7*   PLT  425*     Recent Labs      05/12/18   0240  05/11/18   0935   NA  133*  136   K  4.1  3.4*   CL  101  99   CO2  28  28   GLU  140*  118*   BUN  2*  4*   CREA  0.54*  0.59   CA  8.3*  8.7   MG  1.8   --    ALB   --   2.4*   TBILI   --   0.4   SGOT   --   20   ALT   --   14   ·     Active Problems:    Intra-abdominal abscess (HCC) (5/11/2018)        Problem List Items Addressed This Visit     [facility] ([facility] - Primary

## 2018-05-12 NOTE — PROGRESS NOTES
Problem: Falls - Risk of  Goal: *Absence of Falls  Document Abhi Fall Risk and appropriate interventions in the flowsheet.    Outcome: Progressing Towards Goal  Fall Risk Interventions:       Mentation Interventions: Bed/chair exit alarm, Door open when patient unattended, Room close to nurse's station, Reorient patient    Medication Interventions: Bed/chair exit alarm    Elimination Interventions: Bed/chair exit alarm, Call light in reach

## 2018-05-12 NOTE — PROGRESS NOTES
Bedside and Verbal shift change report given to VANDANA Fernandez (oncoming nurse) by Sylvia Parkinson RN (offgoing nurse). Report included the following information SBAR, Kardex, ED Summary, Procedure Summary, Intake/Output and MAR.

## 2018-05-12 NOTE — PROGRESS NOTES
Problem: Pressure Injury - Risk of  Goal: *Prevention of pressure injury  Document Dimitris Scale and appropriate interventions in the flowsheet.    Outcome: Progressing Towards Goal  Pressure Injury Interventions:  Sensory Interventions: Check visual cues for pain, Assess changes in LOC, Float heels, Keep linens dry and wrinkle-free, Minimize linen layers    Moisture Interventions: Minimize layers, Moisture barrier, Check for incontinence Q2 hours and as needed, Absorbent underpads    Activity Interventions: Pressure redistribution bed/mattress(bed type)    Mobility Interventions: HOB 30 degrees or less, Float heels, Pressure redistribution bed/mattress (bed type)    Nutrition Interventions: Document food/fluid/supplement intake, Offer support with meals,snacks and hydration    Friction and Shear Interventions: HOB 30 degrees or less, Minimize layers

## 2018-05-13 NOTE — PROGRESS NOTES
Assessment / Plan:     Development of intra-abdominal abscess after perforated appendectomy. Likely unable to access percutaneously  Continue IV antibiotic trial for now. Will change to Levaquin/Flagyl with worsening leukocytosis. May re-image tomorrow. Continue denise for urinary retention. Continue meds for alzheimer's and dementia. Shraddha Medrano MD  [facility]  Office:  [phone]        General Surgery Daily Progress Note      Patient: Kayce Sadler MRN: [id number]  SSN: [id number]    YOB: 1934  Age: 80 y.o. Sex: female      Admit Date: 5/11/2018 for abdominal pain    Subjective:   Still confused. Small smear of BM per nursing. Current Facility-Administered Medications   Medication Dose Route Frequency    cefOXitin (MEFOXIN) 2 g in 0.9% sodium chloride (MBP/ADV) 50 mL  2 g IntraVENous Q8H    sodium chloride (NS) flush 5-10 mL  5-10 mL IntraVENous Q8H    sodium chloride (NS) flush 5-10 mL  5-10 mL IntraVENous PRN    HYDROmorphone (DILAUDID) injection 0.5 mg  0.5 mg IntraVENous Q4H PRN    naloxone (NARCAN) injection 0.4 mg  0.4 mg IntraVENous PRN    ondansetron (ZOFRAN) injection 4 mg  4 mg IntraVENous Q4H PRN    docusate sodium (COLACE) capsule 100 mg  100 mg Oral BID    dextrose 5% - 0.45% NaCl with KCl 20 mEq/L infusion  100 mL/hr IntraVENous CONTINUOUS    enoxaparin (LOVENOX) injection 40 mg  40 mg SubCUTAneous Q24H        Objective:      05/11 1901 - 05/13 0700  In: 643.3 [I.V.:643.3]  Out: 1875 [Urine:1875]  Patient Vitals for the past 8 hrs:   BP Temp Pulse Resp SpO2   05/13/18 1033 134/82 99 °F (37.2 °C) 85 16 96 %   05/13/18 0700 - - 82 - -   05/13/18 0643 128/74 98.2 °F (36.8 °C) 92 15 95 %       Physical Exam:  General: Alert, cooperative, no distress, appears stated age.   Neck:  Supple, symmetrical, trachea midline, no adenopathy, thyroid: no                           enlargement/tenderness/nodules, no carotid bruit and no JVD.  Lungs: Clear to auscultation bilaterally. Heart:  Regular rate and rhythm, S1, S2 normal, no murmur, click, rub or gallop. Abdomen: Soft, tender. Mildly distended. Bowel sounds normal. No masses,  No organomegaly. Extremities: Extremities normal, atraumatic, no cyanosis or edema.   Skin:  Skin color, texture, turgor normal. No rashes or lesions    Labs:   Recent Labs      05/12/18   0240   WBC  18.2*   HGB  11.1*   HCT  34.7*   PLT  425*     Recent Labs      05/12/18   0240  05/11/18   0935   NA  133*  136   K  4.1  3.4*   CL  101  99   CO2  28  28   GLU  140*  118*   BUN  2*  4*   CREA  0.54*  0.59   CA  8.3*  8.7   MG  1.8   --    ALB   --   2.4*   TBILI   --   0.4   SGOT   --   20   ALT   --   14       Active Problems:    Int[facility]abdominal [facility] ([facility] (5/11/2018)        Problem List Items Addressed This Visit     Intra-abdominal abscess ([facility] - Primary

## 2018-05-13 NOTE — PROGRESS NOTES
Patient slept most of the night from about midnight till about 0530. Patient has a usually will yell out periodically but would not attempt to get out of bed or pull out lines. Patient has a history of dementia so confused at baseline. No sitter needed most of the night.

## 2018-05-14 NOTE — PROGRESS NOTES
Bedside and Verbal shift change report given to Charlette (oncoming nurse) by anish (offgoing nurse). Report included the following information SBAR, Kardex, Procedure Summary, Intake/Output, MAR and Recent Results.

## 2018-05-14 NOTE — PROGRESS NOTES
Problem: Falls - Risk of  Goal: *Absence of Falls  Document Abhi Fall Risk and appropriate interventions in the flowsheet. Outcome: Progressing Towards Goal  Fall Risk Interventions:       Mentation Interventions: Bed/chair exit alarm, Door open when patient unattended, Eyeglasses and hearing aids, Family/sitter at bedside, More frequent rounding, Reorient patient, Room close to nurse's station, Update white board    Medication Interventions: Bed/chair exit alarm, Patient to call before getting OOB    Elimination Interventions: Bed/chair exit alarm, Call light in reach, Patient to call for help with toileting needs, Toileting schedule/hourly rounds             Problem: Pressure Injury - Risk of  Goal: *Prevention of pressure injury  Document Dimitris Scale and appropriate interventions in the flowsheet. Outcome: Progressing Towards Goal  Pressure Injury Interventions:  Sensory Interventions: Float heels, Keep linens dry and wrinkle-free, Minimize linen layers, Monitor skin under medical devices, Turn and reposition approx.  every two hours (pillows and wedges if needed)    Moisture Interventions: Absorbent underpads, Moisture barrier    Activity Interventions: Pressure redistribution bed/mattress(bed type)    Mobility Interventions: Pressure redistribution bed/mattress (bed type)    Nutrition Interventions: Document food/fluid/supplement intake    Friction and Shear Interventions: Apply protective barrier, creams and emollients, Lift team/patient mobility team

## 2018-05-14 NOTE — PROGRESS NOTES
General Surgery Daily Progress Note    Patient: Kayce Sadler MRN: [id number]  SSN: [id number]    YOB: 1934  Age: 80 y.o. Sex: female      Admit Date: 5/11/2018    Subjective:   Drowsy this morning. Too confused to provide information which is her baseline. She does report abdominal pain. No BM recorded. Current Facility-Administered Medications   Medication Dose Route Frequency    levoFLOXacin (LEVAQUIN) 500 mg in D5W IVPB  500 mg IntraVENous Q24H    metroNIDAZOLE (FLAGYL) IVPB premix 500 mg  500 mg IntraVENous Q12H    busPIRone (BUSPAR) tablet 5 mg  5 mg Oral TID WITH MEALS    QUEtiapine (SEROquel) tablet 25 mg  25 mg Oral QHS    sodium chloride (NS) flush 5-10 mL  5-10 mL IntraVENous Q8H    sodium chloride (NS) flush 5-10 mL  5-10 mL IntraVENous PRN    HYDROmorphone (DILAUDID) injection 0.5 mg  0.5 mg IntraVENous Q4H PRN    naloxone (NARCAN) injection 0.4 mg  0.4 mg IntraVENous PRN    ondansetron (ZOFRAN) injection 4 mg  4 mg IntraVENous Q4H PRN    docusate sodium (COLACE) capsule 100 mg  100 mg Oral BID    dextrose 5% - 0.45% NaCl with KCl 20 mEq/L infusion  100 mL/hr IntraVENous CONTINUOUS    enoxaparin (LOVENOX) injection 40 mg  40 mg SubCUTAneous Q24H        Objective:      05/12 1901 - 05/14 0700  In: 5805 [I.V.:5805]  Out: 2700 [Urine:2700]  Patient Vitals for the past 8 hrs:   BP Temp Pulse Resp SpO2   05/14/18 0727 141/65 98.1 °F (36.7 °C) 81 16 94 %   05/14/18 0700 - - 85 - -   05/14/18 0314 139/65 98 °F (36.7 °C) 88 16 97 %   05/14/18 0220 - - 80 - -       Physical Exam:  General: Drowsy, confused  Lungs: Unlabored  Heart:  Regular rate and  rhythm  Abdomen: Soft, mild lower abdominal tenderness, non-distended. + bowel sounds.    Extremities: Warm, moves all, no edema  Skin:  Warm and dry, no rash    Labs: Recent Labs      05/12/18   0240   WBC  18.2*   HGB  11.1*   HCT  34.7*   PLT  425*     Recent Labs      05/12/18 0240   NA  133*   K  4.1   CL  101   CO2  28 GLU  140*   BUN  2*   CREA  0.54*   CA  8.3*   MG  1.8       Assessment / Plan:   · Intra-abdominal abscess  · Repeat labs pending  · Continue ABX, now on levaquin/Flagyl  · Decision on repeat CT timing pending lab results. · Mobilize  · Enema  · [place] for urinary retention     Dr. Cathi Meyer updated. Plan discussed.

## 2018-05-14 NOTE — PROGRESS NOTES
5/14/2018 11:52 AM [facility] is unable to accept pt back. Called and spoke with pt's  for alternative SNF preferences, explained [facility] is unable to accept back. Pt's  was understanding and selected [facility] as preferences. Referrals sent. CM will follow. 5/14/2018 9:16 AM EMR reviewed, pt is a readmit, discharged from [facility] on 5/3 to [facility] rehab. Per admitting CM note, pt's family wishes for pt to return to [facility] at discharge. Referral sent to [facility] via [employer]. CM will follow up with [facility] to confirm they can accept pt back when medically stable.  MARTITA Villafana

## 2018-05-15 NOTE — PROGRESS NOTES
Problem: Falls - Risk of  Goal: *Absence of Falls  Document Abhi Fall Risk and appropriate interventions in the flowsheet. Outcome: Progressing Towards Goal  Fall Risk Interventions:       Mentation Interventions: Bed/chair exit alarm, Family/sitter at bedside    Medication Interventions: Bed/chair exit alarm    Elimination Interventions: Bed/chair exit alarm, Call light in reach             Problem: Pressure Injury - Risk of  Goal: *Prevention of pressure injury  Document Dimitris Scale and appropriate interventions in the flowsheet.    Outcome: Progressing Towards Goal  Pressure Injury Interventions:  Sensory Interventions: Float heels    Moisture Interventions: Absorbent underpads, Limit adult briefs    Activity Interventions: Pressure redistribution bed/mattress(bed type)    Mobility Interventions: Pressure redistribution bed/mattress (bed type), PT/OT evaluation    Nutrition Interventions: Document food/fluid/supplement intake    Friction and Shear Interventions: Apply protective barrier, creams and emollients

## 2018-05-15 NOTE — PROGRESS NOTES
General Surgery Daily Progress Note    Patient: Neil Metz MRN: [id number]  SSN: [id number]    YOB: 1934  Age: 80 y.o. Sex: female      Admit Date: 5/11/2018    Subjective:   Alert, confused, c/o abdominal pain. Per sitter, patient tolerating clears. No BM recorded.      Current Facility-Administered Medications   Medication Dose Route Frequency    iopamidol (ISOVUE-370) 76 % injection 100 mL  100 mL IntraVENous RAD ONCE    diatrizoate meglumine-d.sodium (MD-GASTROVIEW,GASTROGRAFIN) 66-10 % contrast solution 30 mL  30 mL Oral ONCE    acetaminophen (TYLENOL) tablet 650 mg  650 mg Oral Q6H PRN    HYDROcodone-acetaminophen (NORCO) 5-325 mg per tablet 1 Tab  1 Tab Oral Q6H PRN    levoFLOXacin (LEVAQUIN) 500 mg in D5W IVPB  500 mg IntraVENous Q24H    metroNIDAZOLE (FLAGYL) IVPB premix 500 mg  500 mg IntraVENous Q12H    busPIRone (BUSPAR) tablet 5 mg  5 mg Oral TID WITH MEALS    QUEtiapine (SEROquel) tablet 25 mg  25 mg Oral QHS    sodium chloride (NS) flush 5-10 mL  5-10 mL IntraVENous Q8H    sodium chloride (NS) flush 5-10 mL  5-10 mL IntraVENous PRN    HYDROmorphone (DILAUDID) injection 0.5 mg  0.5 mg IntraVENous Q4H PRN    naloxone (NARCAN) injection 0.4 mg  0.4 mg IntraVENous PRN    ondansetron (ZOFRAN) injection 4 mg  4 mg IntraVENous Q4H PRN    docusate sodium (COLACE) capsule 100 mg  100 mg Oral BID    dextrose 5% - 0.45% NaCl with KCl 20 mEq/L infusion  75 mL/hr IntraVENous CONTINUOUS    enoxaparin (LOVENOX) injection 40 mg  40 mg SubCUTAneous Q24H        Objective:   05/15 0701 - 05/15 1900  In: -   Out: 350 [Urine:350]  05/13 1901 - 05/15 0700  In: 8010 [P.O.:50; I.V.:7960]  Out: 3150 [Urine:3150]  Patient Vitals for the past 8 hrs:   BP Temp Pulse Resp SpO2   05/15/18 0700 - - 81 - -   05/15/18 0310 133/78 98.6 °F (37 °C) 94 18 98 %       Physical Exam:  General: Alert, confused  Lungs: Unlabored  Heart:  Regular rate and  rhythm  Abdomen: Soft, mild lower abdominal tenderness, non-distended. + bowel sounds. Extremities: Warm, moves all, no edema  Skin:  Warm and dry, no rash    Labs:   Recent Labs      05/15/18   0300   WBC  7.1   HGB  10.2*   HCT  32.4*   PLT  379     Recent Labs      05/15/18   0300   NA  135*   K  4.2   CL  101   CO2  26   GLU  104*   BUN  1*   CREA  0.49*   CA  8.4*   MG  1.8       Assessment / Plan:   · Intra-abdominal abscess  · WBC normalized, afebrile, no peritonitis   · Repeat CT today to eval interval change in abscesses. · Clear liquids. Likely can advance pending CT results. · Mobilize as able. PT consult. · Urinary retention: continue denise, voiding trial when approaching discharge. · Appreciate psychiatry eval. Will continue current meds and add Aricept on discharge. · Discussed plan with patient's daughter and all questions answered. · Noted in [facility] consult note that patient has a DNR. I confirmed this with daughter today. Code status changed in epic. Dr. Preeti Graff updated. Plan discussed.

## 2018-05-15 NOTE — PROGRESS NOTES
Psychiatric Follow-Up  Dr. Александр Bowen, Psychiatrist, [facility]    Ernst Andrés female 80 y.o. Interval History:  Saw patient 1:1.  Spoke with staff, reviewed chart and charted. Patient appears worried. Memory loss is prominent. No combativeness. Less agitation with Seroquel and Buspar. Unable to give her own history. ROS:  Gen - C/o some continued abdominal pain. Psych - Does not appear to be responding to internal stimuli.     Current Inpatient Medications:  Current Facility-Administered Medications   Medication Dose Route Frequency    bisacodyl (DULCOLAX) suppository 10 mg  10 mg Rectal DAILY    acetaminophen (TYLENOL) tablet 650 mg  650 mg Oral Q6H PRN    HYDROcodone-acetaminophen (NORCO) 5-325 mg per tablet 1 Tab  1 Tab Oral Q6H PRN    levoFLOXacin (LEVAQUIN) 500 mg in D5W IVPB  500 mg IntraVENous Q24H    metroNIDAZOLE (FLAGYL) IVPB premix 500 mg  500 mg IntraVENous Q12H    busPIRone (BUSPAR) tablet 5 mg  5 mg Oral TID WITH MEALS    QUEtiapine (SEROquel) tablet 25 mg  25 mg Oral QHS    sodium chloride (NS) flush 5-10 mL  5-10 mL IntraVENous Q8H    sodium chloride (NS) flush 5-10 mL  5-10 mL IntraVENous PRN    naloxone (NARCAN) injection 0.4 mg  0.4 mg IntraVENous PRN    ondansetron (ZOFRAN) injection 4 mg  4 mg IntraVENous Q4H PRN    docusate sodium (COLACE) capsule 100 mg  100 mg Oral BID    enoxaparin (LOVENOX) injection 40 mg  40 mg SubCUTAneous Q24H        Mental Status Examination:  Level of Consciousness:  Alert  Orientation:  Oriented only to self  Appearance:  Normal grooming for situation  Cooperation:  Cooperative - attempts  Speech:  Normal rate/rhythm  Mood:  anxious  Affect:  congruent  Thought process:  Tries to answer questions linearly  Suicidal Ideation:  None  Homicidal Ideation:  None  Perceptual Disturbances:  None noted  Memory:  impaired  Judgement/Insight:  impaired  Attention/Concentration:  Adequate for interview  Psychomotor Agitation:  None    Vital Signs:    Visit Vitals    /78 (BP 1 Location: Left arm, BP Patient Position: At rest)    Pulse 93    Temp 97.9 °F (36.6 °C)    Resp 18    Ht 5' 3\" (1.6 m)    Wt 59 kg (130 lb)    SpO2 95%    BMI 23.03 kg/m2       Pertinent Labs/Studies:    Recent Results (from the past 24 hour(s))   CBC W/O DIFF    Collection Time: 05/15/18  3:00 AM   Result Value Ref Range    WBC 7.1 3.6 - 11.0 K/uL    RBC 3.58 (L) 3.80 - 5.20 M/uL    HGB 10.2 (L) 11.5 - 16.0 g/dL    HCT 32.4 (L) 35.0 - 47.0 %    MCV 90.5 80.0 - 99.0 FL    MCH 28.5 26.0 - 34.0 PG    MCHC 31.5 30.0 - 36.5 g/dL    RDW 13.2 11.5 - 14.5 %    PLATELET 674 095 - 287 K/uL    MPV 9.8 8.9 - 12.9 FL    NRBC 0.0 0  WBC    ABSOLUTE NRBC 0.00 0.00 - 2.14 K/uL   METABOLIC PANEL, BASIC    Collection Time: 05/15/18  3:00 AM   Result Value Ref Range    Sodium 135 (L) 136 - 145 mmol/L    Potassium 4.2 3.5 - 5.1 mmol/L    Chloride 101 97 - 108 mmol/L    CO2 26 21 - 32 mmol/L    Anion gap 8 5 - 15 mmol/L    Glucose 104 (H) 65 - 100 mg/dL    BUN 1 (L) 6 - 20 MG/DL    Creatinine 0.49 (L) 0.55 - 1.02 MG/DL    BUN/Creatinine ratio 2 (L) 12 - 20      GFR est AA >60 >60 ml/min/1.73m2    GFR est non-AA >60 >60 ml/min/1.73m2    Calcium 8.4 (L) 8.5 - 10.1 MG/DL   MAGNESIUM    Collection Time: 05/15/18  3:00 AM   Result Value Ref Range    Magnesium 1.8 1.6 - 2.4 mg/dL     Recent TSH wnl. Impression:  80year old female with severe dementia diagnosed previously as Alzheimer's type. She is having some mild agitation and behavioral disturbances. She is on Seroquel and Buspar as above (she was on these previously, just restarted) with improvement.     Psychiatric Diagnoses:  Major Neurocognitive Disorder (Alzheimer's dementia)     Plan: 1. Continue Buspar and Seroquel as above - r/b/se including bb warning dw .   2.  Once patient's abdominal pain has improved and she is eating with normal bowel movements (either at discharge or at outpatient fu if symptoms not resolved at discharge as Aricept has common GI side effects), would recommend starting Aricept 5 mg po every day. I have discussed this with her  including r/b/se and have dw neurology team.  DD interactions reviewed. QTc on 511 = 456. She should have follow up for this with psychiatry or neurology as an outpatient shortly after discharge. 3.  Continue nonpharmacologic treatments for any agitation. Routines, distraction, calm reassurance can be very helpful staff interventions. Having a sitter or family in the room appears to be very beneficial to the patient. 4.  Recent TSH wnl but have ordered a B12 level for the morning.     45 minutes spent on floor. >50% in counseling/coordination of care activities as described above. Elizabeth Stephens.  VARSHA Garcia.

## 2018-05-15 NOTE — PROGRESS NOTES
Wound note- Patient referred to wound care for multiple POA wounds on her feet. They have a weird pattern and are located on the dorsum of the feett and on both medial malleoli. They are both dry and intact and not in areas for concern that would increase trauma to them from pressure. She has prevalon boots. Her Dimitris is borderline low-she is on the turn team- will order a specialty bed as patient refuses to move most times. The areas of concern on her feet have same pattern on both feet so seem like maybe some kind of shoe or positioning may have caused the breakdown on them. Leave open to air and keep an eye on them.     Mortimer Pester, PT, DPT, [facility]

## 2018-05-15 NOTE — PROGRESS NOTES
5/15/2018 3:11 PM [facility] has denied pt. Spoke with pt's daughter regarding alternative facilities, [facility] were selected. Referrals sent. Pt will need to be at least 24 hours sitter free before discharge to SNF.     5/15/2018 9:46 AM Spoke with Veronica Hess in admissions at [facility] who reported she will review pt's referral with her  and follow up with CM.     5/15/2018 9:42 AM [facility] has denied pt. Called and left a message for admissions at [facility]. CM will follow up.  DINORA LemonW

## 2018-05-15 NOTE — CONSULTS
Initial Psychiatric Consultation  Dr. Imtiaz Chen, Psychiatrist, [facility]    Vicky Cota female 80 y.o. Chief Complaint: Agitation     History of Present Illness:  I was asked by Dr. Ayo Hartmann to see this patient for agitation associated with dementia. I examined patient in presence of sitter, spoke with staff, spoke with her , reviewed chart and charted. The patient was diagnosed with Alzheimer's dementia years ago. She was admitted to the hospital this time for abdominal pain and found to have an abdominal abscess s/p recent laparoscopic repair of a perforated appendix. The patient is unable to give her own history. She is confused and disoriented. She perseverates on her own importance and all of the people she knows. She states that she is upset and anxious, but there are no overt delusions or psychotic symptoms. She has never been on a cholinesterase inhibitor or Namenda. She has a history of anxiety. Staff reports that the patient does well in terms of agitation when sitter or family is in the room. When alone, the patient has a lot of calling out behavior. There have been no violent or destructive behaviors reported. Seroquel and buspar are reported to be helpful to the patient's symptoms. ROS:  Gen - Alert patient, states she has abdominal pain. Psych - See hpi.   Neuro - no h/o CVA or Parkinson's    Past Medical History:  Patient Active Problem List    Diagnosis Date Noted    Intra-abdominal abscess ([facility] [address] 05/11/2018    Prediabetes 04/30/2018    SIRS (systemic inflammatory response syndrome) ([facility] [address] 04/30/2018    Abdominal pain 04/29/2018    Dementia without behavioral disturbance 05/13/2015    Memory loss 05/13/2015    Fatigue 04/10/2015    Urinary urgency 09/27/2012    Dizziness 09/20/2012    Nonspecific abnormal electrocardiogram (ECG) (EKG) 09/20/2012    Anxiety 04/23/2010    GERD (gastroesophageal reflux disease) 04/23/2010     Past Medical History:   Diagnosis Date    Alzheimer disease     Anxiety 4/23/2010    GERD (gastroesophageal reflux disease) 4/23/2010    Mild dementia     UTI (lower urinary tract infection) 4/23/2010       Past Psychiatric History: see hpi    Current Inpatient Medications:  Current Facility-Administered Medications   Medication Dose Route Frequency    acetaminophen (TYLENOL) tablet 650 mg  650 mg Oral Q6H PRN    HYDROcodone-acetaminophen (NORCO) 5-325 mg per tablet 1 Tab  1 Tab Oral Q6H PRN    [START ON 5/15/2018] donepezil (ARICEPT) tablet 5 mg  5 mg Oral QHS    levoFLOXacin (LEVAQUIN) 500 mg in D5W IVPB  500 mg IntraVENous Q24H    metroNIDAZOLE (FLAGYL) IVPB premix 500 mg  500 mg IntraVENous Q12H    busPIRone (BUSPAR) tablet 5 mg  5 mg Oral TID WITH MEALS    QUEtiapine (SEROquel) tablet 25 mg  25 mg Oral QHS    sodium chloride (NS) flush 5-10 mL  5-10 mL IntraVENous Q8H    sodium chloride (NS) flush 5-10 mL  5-10 mL IntraVENous PRN    HYDROmorphone (DILAUDID) injection 0.5 mg  0.5 mg IntraVENous Q4H PRN    naloxone (NARCAN) injection 0.4 mg  0.4 mg IntraVENous PRN    ondansetron (ZOFRAN) injection 4 mg  4 mg IntraVENous Q4H PRN    docusate sodium (COLACE) capsule 100 mg  100 mg Oral BID    dextrose 5% - 0.45% NaCl with KCl 20 mEq/L infusion  75 mL/hr IntraVENous CONTINUOUS    enoxaparin (LOVENOX) injection 40 mg  40 mg SubCUTAneous Q24H        Social History: Admitted from [facility], has involved  and family. No known substance abuse. Psychiatric Family History: unknown    Mental Status Examination:  Level of Consciousness:  Alert  Orientation:  Oriented only to self  Appearance:  Normal grooming for situation  Cooperation:  Attempts to be cooperative  Speech:  Normal rate/rhythm  Mood:  Mildly upset  Affect:  Congruent  Thought process:   Tangential, perseverates as in HPI  Suicidal Ideation:  None endorsed  Homicidal Ideation:  None endorsed  Perceptual Disturbances:  None  Memory: Severely impaired  Judgement/Insight:  Impaired  Attention/Concentration:  Adequate for interview  Psychomotor Agitation:  None currently    Vital Signs:    Visit Vitals    /78 (BP 1 Location: Right arm, BP Patient Position: At rest)    Pulse 85    Temp 97.6 °F (36.4 °C)    Resp 16    Ht 5' 3\" (1.6 m)    Wt 59 kg (130 lb)    SpO2 98%    BMI 23.03 kg/m2       Pertinent Labs/Studies:    Recent Results (from the past 24 hour(s))   CBC W/O DIFF    Collection Time: 05/14/18 10:08 AM   Result Value Ref Range    WBC 7.7 3.6 - 11.0 K/uL    RBC 3.69 (L) 3.80 - 5.20 M/uL    HGB 10.7 (L) 11.5 - 16.0 g/dL    HCT 33.6 (L) 35.0 - 47.0 %    MCV 91.1 80.0 - 99.0 FL    MCH 29.0 26.0 - 34.0 PG    MCHC 31.8 30.0 - 36.5 g/dL    RDW 13.2 11.5 - 14.5 %    PLATELET 776 463 - 335 K/uL    MPV 9.5 8.9 - 12.9 FL    NRBC 0.0 0  WBC    ABSOLUTE NRBC 0.00 0.00 - 5.27 K/uL   METABOLIC PANEL, BASIC    Collection Time: 05/14/18 10:08 AM   Result Value Ref Range    Sodium 133 (L) 136 - 145 mmol/L    Potassium 4.3 3.5 - 5.1 mmol/L    Chloride 99 97 - 108 mmol/L    CO2 28 21 - 32 mmol/L    Anion gap 6 5 - 15 mmol/L    Glucose 115 (H) 65 - 100 mg/dL    BUN 3 (L) 6 - 20 MG/DL    Creatinine 0.60 0.55 - 1.02 MG/DL    BUN/Creatinine ratio 5 (L) 12 - 20      GFR est AA >60 >60 ml/min/1.73m2    GFR est non-AA >60 >60 ml/min/1.73m2    Calcium 8.6 8.5 - 10.1 MG/DL   MAGNESIUM    Collection Time: 05/14/18 10:08 AM   Result Value Ref Range    Magnesium 1.9 1.6 - 2.4 mg/dL       Impression:  80year old female with severe dementia diagnosed previously as Alzheimer's type. She is having some mild agitation and behavioral disturbances. She is on Seroquel and Buspar as above (she was on these previously, just restarted). She slept better last night and does better with a sitter in the room. I talked to her  about the r/b/se of these medications including the black box warnings in dementia.   I also talked to the  about starting Aricept, and I would recommend starting this at 5 mg once her abdominal pain has improved. Psychiatric Diagnoses:  Major Neurocognitive Disorder (Alzheimer's dementia)    Plan: 1. Continue Buspar and Seroquel as above. 2.  Once abdominal pain has improved, would recommend starting Aricept 5 mg po every day. I have discussed this with her  including r/b/se. She should have follow up for this with psychiatry or neurology as an outpatient. 3.  Nonpharmacologic treatments for agitation should be encouraged. Routines, distraction, calm reassurance can be very helpful staff interventions. Having a sitter or family in the room appears to be very beneficial to the patient. 60 minutes spent on floor. >50% in counseling/coordination of care activities as described above. Tone Garcia M.D.

## 2018-05-15 NOTE — PROGRESS NOTES
Problem: Mobility Impaired (Adult and Pediatric)  Goal: *Acute Goals and Plan of Care (Insert Text)  Physical Therapy Goals  Initiated 5/15/2018  1. Patient will move from supine to sit and sit to supine , scoot up and down and roll side to side in bed with maximal assistance x 1 within 7 day(s). 2.  Patient will sustain standing with RW with max assistance x 2 for 1 minute (in order to switch out bed and put chair behind her) within 7 days. 3.  Patient will perform sit <> stand with maximal assistance x 2 within 7 day(s). 4.  Patient will tolerate up in chair x 1 hour within 7 days. physical Therapy EVALUATION  Patient: Clint Saenz (17 y.o. female)  Date: 5/15/2018  Primary Diagnosis: Intra-abdominal abscess ([facility] [address]        Precautions:  Bed Alarm, DNR, Fall, Skin    ASSESSMENT :  Based on the objective data described below, the patient presents with abdominal pain, lack of strength x 4 extremities, frail and thin, highly confused, and agitated easily with movement. Patient is completely unaware of her surroundings,current situation, or phase of the day. She perseverates on [employer] that are famous and living in [place]. Patient is unable to a single one step command. Patient has 1:1 sitter present and is incontinent of stool with attempts to stand. Patient unable to stand upright and heavy resistance pulling hips posterior and flailing with fear of falling. PT can trial 3x/week to attempt to make progress with functional mobility and assess if any carryover. Given how advanced patient's dementia is suspect she will require total assistance x 1-2 for bed mobility and transfer to/from chair as well as continuous 1:1 supervision and assistance. Patient is at high risk for falling if she attempts OOB or if she is left edge of bed unsupervised. Patient requires 24/7 supervision and assistance.  Recommend bilateral PRAFOs (which are in room), turn team 2 hrs, and specialty mattress to prevent skin breakdown. Will discuss mattress with nursing. Patient will benefit from skilled intervention to address the above impairments. Patients rehabilitation potential is considered to be Poor -   Factors which may influence rehabilitation potential include:   []         None noted  [x]         Mental ability/status  [x]         Medical condition  []         Home/family situation and support systems  [x]         Safety awareness  [x]         Pain tolerance/management  []         Other:      PLAN :  Recommendations and Planned Interventions:  [x]           Bed Mobility Training             []    Neuromuscular Re-Education  [x]           Transfer Training                   []    Orthotic/Prosthetic Training  []           Gait Training                         []    Modalities  []           Therapeutic Exercises           []    Edema Management/Control  []           Therapeutic Activities            [x]    Patient and Family Training/Education  []           Other (comment):    Frequency/Duration: Patient will be followed by physical therapy  3 times a week to address goals. Discharge Recommendations: [facility]  Further Equipment Recommendations for Discharge: None - TBD at SNF     SUBJECTIVE:   Patient stated Barbara Gonsales are you getting out of bed.     OBJECTIVE DATA SUMMARY:   HISTORY:    Past Medical History:   Diagnosis Date    Alzheimer disease     Anxiety 4/23/2010    GERD (gastroesophageal reflux disease) 4/23/2010    Mild dementia     UTI (lower urinary tract infection) 4/23/2010     Past Surgical History:   Procedure Laterality Date    ENDOSCOPY, COLON, DIAGNOSTIC      HX CATARACT REMOVAL  5/2011    left    HX UROLOGICAL      bladder uplift     Prior Level of Function/Home Situation: No family present- patient unable to give any history  Personal factors and/or comorbidities impacting plan of care: No family present and patient unable to give any history EXAMINATION/PRESENTATION/DECISION MAKING:   Critical Behavior:  Neurologic State: Alert  Orientation Level: Oriented to person  Cognition: Follows commands     Hearing: Auditory  Auditory Impairment: Hard of hearing, bilateral    Range Of Motion:  AROM: Generally decreased, functional           PROM: Generally decreased, functional           Strength:    Strength: Grossly decreased, non-functional                    Tone & Sensation:   Tone: Abnormal              Sensation:  (NT)               Coordination:  Coordination: Grossly decreased, non-functional       Functional Mobility:  Bed Mobility:  Rolling: Total assistance;Assist x1  Supine to Sit: Total assistance;Assist x2  Sit to Supine: Total assistance;Assist x2  Scooting: Total assistance;Assist x1 (unable to follow commands)  Transfers:  Sit to Stand: Maximum assistance;Assist x2 (unable to achieve full stance- COG posterior)  Stand to Sit: Maximum assistance;Assist x2  Stand Pivot Transfers:  (unable)     Bed to Chair:  (unable)              Balance:   Sitting: Impaired;High guard; With support  Sitting - Static: Poor (constant support)  Sitting - Dynamic: Poor (constant support)  Standing: Impaired  Standing - Static: Poor  Standing - Dynamic : Poor  Ambulation/Gait Training:              Gait Description (WDL):  (unable to stand)         Functional Measure:  Barthel Index:    Bathin  Bladder: 0  Bowels: 0  Groomin  Dressin  Feedin  Mobility: 0  Stairs: 0  Toilet Use: 0  Transfer (Bed to Chair and Back): 0  Total: 0       Barthel and G-code impairment scale:  Percentage of impairment CH  0% CI  1-19% CJ  20-39% CK  40-59% CL  60-79% CM  80-99% CN  100%   Barthel Score 0-100 100 99-80 79-60 59-40 20-39 1-19   0   Barthel Score 0-20 20 17-19 13-16 9-12 5-8 1-4 0      The Barthel ADL Index: Guidelines  1. The index should be used as a record of what a patient does, not as a record of what a patient could do.   2. The main aim is to establish degree of independence from any help, physical or verbal, however minor and for whatever reason. 3. The need for supervision renders the patient not independent. 4. A patient's performance should be established using the best available evidence. Asking the patient, friends/relatives and nurses are the usual sources, but direct observation and common sense are also important. However direct testing is not needed. 5. Usually the patient's performance over the preceding 24-48 hours is important, but occasionally longer periods will be relevant. 6. Middle categories imply that the patient supplies over 50 per cent of the effort. 7. Use of aids to be independent is allowed. Yannick Dobbins., Barthel, D.W. (1379). Functional evaluation: the Barthel Index. [address] [facility] (14)2. Alpesh Bey reid GUSTAVO Gongora, Neo Buck., Arsalan Hidalgo., [facility], [address] [facility] (1999). Measuring the change indisability after inpatient rehabilitation; comparison of the responsiveness of the Barthel Index and Functional [place] Measure. Journal of Neurology, Neurosurgery, and Psychiatry, 66(4), 141-276. Martinez Anderson, N.J.A, KEKE Garvin, & Ada Rojas, MLazaroA. (2004.) Assessment of post-stroke quality of life in cost-effectiveness studies: The usefulness of the Barthel Index and the EuroQoL-5D. Quality of Life Research, 13, 357-13       G codes: In compliance with CMSs Claims Based Outcome Reporting, the following G-code set was chosen for this patient based on their primary functional limitation being treated: The outcome measure chosen to determine the severity of the functional limitation was the Barthel Index with a score of 0/100 which was correlated with the impairment scale.     ? Mobility - Walking and Moving Around:     - CURRENT STATUS: CN - 100% impaired, limited or restricted    - GOAL STATUS: CM - 80%-99% impaired, limited or restricted    - D/C STATUS:  ---------------To be determined--------------- Physical Therapy Evaluation Charge Determination   History Examination Presentation Decision-Making   HIGH Complexity :3+ comorbidities / personal factors will impact the outcome/ POC  HIGH Complexity : 4+ Standardized tests and measures addressing body structure, function, activity limitation and / or participation in recreation  HIGH Complexity : Unstable and unpredictable characteristics  Other outcome measures Barthel Index  HIGH       Based on the above components, the patient evaluation is determined to be of the following complexity level: HIGH     Pain:  Pain Scale 1: Numeric (0 - 10)  Pain Intensity 1: 2  Pain Location 1: Abdomen  Pain Orientation 1: Anterior  Pain Description 1: Aching  Pain Intervention(s) 1: Medication (see MAR)  Activity Tolerance:   Poor   Please refer to the flowsheet for vital signs taken during this treatment. After treatment:   []         Patient left in no apparent distress sitting up in chair  [x]         Patient left in no apparent distress in bed  [x]         Call bell left within reach  [x]         Nursing notified  [x]         Caregiver present  [x]         Bed alarm activated    COMMUNICATION/EDUCATION:   The patients plan of care was discussed with: Registered Nurse.  []         Fall prevention education was provided and the patient/caregiver indicated understanding. []         Patient/family have participated as able in goal setting and plan of care. []         Patient/family agree to work toward stated goals and plan of care. []         Patient understands intent and goals of therapy, but is neutral about his/her participation. [x]         Patient is unable to participate in goal setting and plan of care.     Thank you for this referral.  Amna Boo, PT   Time Calculation: 23 mins

## 2018-05-16 NOTE — CONSULTS
[address]     Desmond Atwood  MR#: [id number]  : 1934  ACCOUNT #: [de-identified]   DATE OF SERVICE: 2018    HISTORY OF PRESENT ILLNESS:  As follows: The patient is an 80-year-old female who was recently admitted to [place] [address] from  through  due to perforated appendicitis. Patient has history of dementia, gastroesophageal reflux disease and atrial fibrillation. She was admitted to [place] [address] with new onset abdominal pain on . Workup revealed peritonitis due to perforated appendicitis. She underwent laparoscopic appendectomy on . She was initially treated with cefepime and metronidazole. Antibiotics were narrowed to levofloxacin and metronidazole with plans to complete a 14-day course of therapy in the outpatient setting. She was readmitted on  with complaints of returning abdominal pain. CT scan at the time of admission revealed several intraabdominal abscesses and a small amount of free air. She was initially started on cefoxitin from  through . Antibiotics were subsequently changed to levofloxacin and metronidazole. Repeat CT scan shows improvement of the abscesses and her white count has normalized. Discharge planning is underway and the infectious disease service has been asked to assist with antibiotic management. PAST MEDICAL HISTORY:  Alzheimer disease, anxiety, gastroesophageal reflux disease, dementia, perforated appendicitis. PAST SURGICAL HISTORY:  Cataract removal, bladder tacking. FAMILY HISTORY:  Unspecified cancer in her father. SOCIAL HISTORY:  No alcohol, tobacco or illicit drug use. OUTPATIENT MEDICATIONS:  Please see body of chart for details. She was on levofloxacin and metronidazole prior to admission. REVIEW OF SYSTEMS:  Unobtainable, as the patient is a poor historian.     ALLERGIES:  MACRODANTIN, PENICILLIN, SULFA ALL CAUSE A RASH.    LABORATORY DATA:  White blood cell count was 12,000 at the time of admission, is 6700 today. Platelet count 423,707. Creatinine is 0.5. Urine culture is growing Candida tropicalis 15,000 colonies per mL. CT scan of the abdomen and pelvis from 05/15 reveals overall improvement with improved free intraperitoneal air and improved abscesses. PHYSICAL EXAMINATION:  VITAL SIGNS:  Temperature 98 degrees Fahrenheit, maximal temperature in the last 48 hours is less than 100. Heart rate 70 beats per minute, blood pressure 133/68, respiratory rate 18, oxygen saturation 97% on room air. GENERAL:  Alert, no acute distress. HEENT:  Normocephalic, atraumatic. Mucous membranes moist.  NECK:  Supple. HEART:  Regular rate and rhythm, no murmurs, gallops or rubs. PULMONARY:  Clear to auscultation anteriorly. ABDOMEN:  Soft, tender to palpation, nondistended. Positive bowel sounds. EXTREMITIES:  No clubbing, cyanosis or edema. SKIN:  No rashes. ASSESSMENT AND PLAN:  1. Postop intraabdominal abscesses due to perforated appendicitis. The patient is status post laparoscopic appendectomy on 04/30. The patient returned to the hospital with abdominal pain despite being on levofloxacin and metronidazole. She was started on cefoxitin at the time of admission. White count peaked at 18,000 on 05/12. No CBC was done on 05/13 and antibiotics were changed from cefoxitin to metronidazole and levofloxacin on the 05/13. It is difficult to determine based on the timing whether the patient improved because she received the cefoxitin or it was due to resuming the levofloxacin and metronidazole. I am concerned that the abscesses occurred while she was on levofloxacin and metronidazole in the outpatient setting. As she appears to have improved on the cefoxitin, I will change the antibiotics to ceftriaxone which will provide similar coverage to the cefoxitin with the addition of metronidazole.   Plan additional 4-week course of ceftriaxone due to these intraabdominal abscesses which are not amenable to percutaneous drainage. She will follow up with surgery in the outpatient setting. IV antibiotic orders are in the chart. 2.  Dementia. 3.  Candiduria, only 15,000 colonies per mL isolated in the urine culture. No treatment planned. 4.  HISTORY OF MACRODANTIN, PENICILLIN AND SULFA ALLERGIES. Thank you for allowing me to participate in care of this patient.       DO JUAN MIGUEL White /   D: 05/16/2018 11:20     T: 05/16/2018 12:00  JOB #: [id number]

## 2018-05-16 NOTE — PROGRESS NOTES
Sitter discontinued at 1300. Ryder discontinued and purewick placed. Sitter only ever in place to act as a . Pt frequently confused and becomes intermittently distressed when alone. No issues with pulling at lines or attempting to get out of bed. Family currently in room.

## 2018-05-16 NOTE — PROGRESS NOTES
5/16/2018 2:56 PM [facility] can accept if pt can be sitter free for over 24 hours. [facility] is still pending, their liaison will be coming to assess pt at 3:30pm today and make a decision after. Pt's daughter, CELSA([phone]) was notified. CM will follow. 5/16/2018 9:26 AM Referrals pending with [facility] and [facility]. Lvm with admissions at [facility]. Spoke with Talon Murry at [facility], referral will be reviewed. CM will follow.  MARTITA Abdullahi

## 2018-05-16 NOTE — PROGRESS NOTES
Post Discharge PICC and Antibiotic Orders    1. Diagnosis:  Intraabdominal abscesses  2. Antibiotic:  ceftriazone 2gm IV daily through 6/12/18                         Metronidazole 500mg PO q 8 through 6/12/18  3. Routine PICC/ Shreyas/ Portacath Care including PRN catheter flow management  4. Weekly labs:   _x__CBC/diff/platelets   _x__BUN/Creatinine   ___CPK   _x_AST/TotalBilirubin/AlkalinePhosphatase   ___CRP   ___Gentamicin level  ___gentamicin peak/trough   Trough Vancomycin level ____goal 10-15 ___goal 15-20  5. Fax lab to [phone]  Call Critical Lab Values to [phone]  6. May send to IR for line evaluation or replacement -[phone]  7. Home Health to pull PIC line at end of therapy or send to IR for Shreyas removal  8. Allergies: Allergies   Allergen Reactions    Macrodantin [Nitrofurantoin Macrocrystalline] Rash    Pcn [Penicillins] Rash    Sulfa (Sulfonamide Antibiotics) Rash     9. Pharmacy Consult for Vancomycin dosing/gentamicin dosing.       Reza Catalan DO

## 2018-05-16 NOTE — PROGRESS NOTES
General Surgery Daily Progress Note    Patient: Migdalia Esteban MRN: [id number]  SSN: [id number]    YOB: 1934  Age: 80 y.o. Sex: female      Admit Date: 5/11/2018    Subjective:   Alert, confused, c/o abdominal pain. Per sitter, patient tolerating diet. No BM recorded. Current Facility-Administered Medications   Medication Dose Route Frequency    bisacodyl (DULCOLAX) suppository 10 mg  10 mg Rectal DAILY    acetaminophen (TYLENOL) tablet 650 mg  650 mg Oral Q6H PRN    HYDROcodone-acetaminophen (NORCO) 5-325 mg per tablet 1 Tab  1 Tab Oral Q6H PRN    levoFLOXacin (LEVAQUIN) 500 mg in D5W IVPB  500 mg IntraVENous Q24H    metroNIDAZOLE (FLAGYL) IVPB premix 500 mg  500 mg IntraVENous Q12H    busPIRone (BUSPAR) tablet 5 mg  5 mg Oral TID WITH MEALS    QUEtiapine (SEROquel) tablet 25 mg  25 mg Oral QHS    sodium chloride (NS) flush 5-10 mL  5-10 mL IntraVENous Q8H    sodium chloride (NS) flush 5-10 mL  5-10 mL IntraVENous PRN    naloxone (NARCAN) injection 0.4 mg  0.4 mg IntraVENous PRN    ondansetron (ZOFRAN) injection 4 mg  4 mg IntraVENous Q4H PRN    docusate sodium (COLACE) capsule 100 mg  100 mg Oral BID    enoxaparin (LOVENOX) injection 40 mg  40 mg SubCUTAneous Q24H        Objective:      05/14 1901 - 05/16 0700  In: 1288.8 [I.V.:1288.8]  Out: 3400 [Urine:3400]  Patient Vitals for the past 8 hrs:   BP Temp Pulse Resp SpO2   05/16/18 0734 138/63 97.6 °F (36.4 °C) 77 18 95 %   05/16/18 0700 - - 71 - -   05/16/18 0527 150/73 98.2 °F (36.8 °C) 82 18 98 %       Physical Exam:  General: Alert, confused  Lungs: Unlabored  Heart:  Regular rate and  rhythm  Abdomen: Soft, mild lower abdominal tenderness, non-distended. + bowel sounds.    Extremities: Warm, moves all, no edema  Skin:  Warm and dry, no rash    Labs:   Recent Labs      05/16/18   0526   WBC  6.7   HGB  11.1*   HCT  35.3   PLT  409*     Recent Labs      05/16/18   0526   NA  135*   K  4.1   CL  103   CO2  25   GLU  107*   BUN 1*   CREA  0.58   CA  8.9   MG  1.8       Assessment / Plan:   · Intra-abdominal abscess  · WBC normalized, afebrile, no peritonitis   · Repeat CT shows improvement in abscess and colonic inflammation   · Improved on IV Levo/Flagyl but she had been on this PO when she was admitted so unsure if transitioning back to PO would be sufficient. Will get ID input on outpatient ABX. · Continue diet   · Mobilize as able. PT consult. · Urinary retention: voiding trial today. · Appreciate psychiatry eval. Will continue current meds. · Discharge planning.

## 2018-05-16 NOTE — PROGRESS NOTES
Bedside and Verbal shift change report given to [place] (oncoming nurse) by Shad Mayo (offgoing nurse). Report included the following information SBAR, Kardex, Procedure Summary, MAR and Recent Results.

## 2018-05-16 NOTE — PROGRESS NOTES
PICC Placement Note    PRE-PROCEDURE VERIFICATION  Correct Procedure: yes  Correct Site:  yes  Temperature: Temp: 98 °F (36.7 °C), Temperature Source: Temp Source: Oral  Recent Labs      05/16/18   0526   BUN  1*   CREA  0.58   PLT  409*   WBC  6.7     Allergies: Macrodantin [nitrofurantoin macrocrystalline]; Pcn [penicillins]; and Sulfa (sulfonamide antibiotics)  Education materials for PICC Care given: yes. See Patient Education activity for further details. PICC Booklet placed at bedside: yes    Closed Ended PICC Catheters:  Flush Lumens as Follows:  Intermittent Medication:   Flush before and after each medication with 10 ml NS. Unused Ports:  Flush every 8 hours with 10 ml NS.  TPN Ports:  Flush every 24 hours with 20 ml NS prior to hanging new bag. Blood Draws: Stop infusion, draw off and waste 10 ml of blood. Draw sample with 10cc syringe or greater. DO NOT USE VACUTAINER . Transfer with appropriate device to lab  tubes. Flush with 20 ml NS. Dressing Change:  Every 7 days, and PRN using sterile technique if integrity of dressing is compromised. Initial dressing change for central line 24-48 hours post insertion if gauze is used. Apply new dressing per policy. PROCEDURE DETAIL  Consent was obtained and all questions were answered related to risks and benefits. A double lumen PICC line was inserted, as a sterile procedure using ultrasound and modified Seldinger technique for TPN. The following documentation is in addition to the PICC properties in the lines/airways flowsheet :  Lot #: [id number]  Lidocaine 1% administered intradermally :yes  Internal Catheter Total Length: 36 (cm)  Vein Selection for PICC:right basilic  Central Line Bundle followed yes  Complication Related to Insertion:no     X-ray: yes. The tip of the PICC line terminates at the SVC.     Line is okay to use: yes    Trell Killian RN

## 2018-05-16 NOTE — PROGRESS NOTES
Problem: Falls - Risk of  Goal: *Absence of Falls  Document Abhi Fall Risk and appropriate interventions in the flowsheet. Outcome: Progressing Towards Goal  Fall Risk Interventions:       Mentation Interventions: Adequate sleep, hydration, pain control, Room close to nurse's station    Medication Interventions: Bed/chair exit alarm, Patient to call before getting OOB    Elimination Interventions: Bed/chair exit alarm, Call light in reach, Patient to call for help with toileting needs             Problem: Pressure Injury - Risk of  Goal: *Prevention of pressure injury  Document Dimitris Scale and appropriate interventions in the flowsheet. Outcome: Progressing Towards Goal  Pressure Injury Interventions:  Sensory Interventions: Float heels, Pressure redistribution bed/mattress (bed type), Turn and reposition approx.  every two hours (pillows and wedges if needed)    Moisture Interventions: Absorbent underpads    Activity Interventions: Pressure redistribution bed/mattress(bed type)    Mobility Interventions: Pressure redistribution bed/mattress (bed type), PT/OT evaluation    Nutrition Interventions: Document food/fluid/supplement intake    Friction and Shear Interventions: Apply protective barrier, creams and emollients, HOB 30 degrees or less, Foam dressings/transparent film/skin sealants

## 2018-05-16 NOTE — PROGRESS NOTES
Spiritual Care Assessment/Progress Note  [facility]      NAME: Garth Parker      MRN: [id number]  AGE: 80 y.o. SEX: female  Restorationism Affiliation: Sue   Language: English     5/16/2018     Total Time (in minutes): 20     Spiritual Assessment begun in [facility] POST SURG ORT 2 through conversation with:         [x]Patient        [x] Family    [] Friend(s)        Reason for Consult: Request by staff     Spiritual beliefs: (Please include comment if needed)     [x] Identifies with a bill tradition: Beckie Adam     [] Supported by a bill community:      [] Claims no spiritual orientation:      [] Seeking spiritual identity:           [] Adheres to an individual form of spirituality:      [] Not able to assess:                     Identified resources for coping:      [] Prayer                               [] Music                  [] Guided Imagery     [] Family/friends                 [] Pet visits     [] Devotional reading                         [] Unknown     [] Other:                                              Interventions offered during this visit: (See comments for more details)    Patient Interventions: Affirmation of emotions/emotional suffering, Affirmation of bill, Iconic (affirming the presence of God/Higher Power), Initial/Spiritual assessment, patient floor, Prayer (assurance of)     Family/Friend(s):  Affirmation of emotions/emotional suffering, Affirmation of bill, Catharsis/review of pertinent events in supportive environment, Prayer (assurance of)     Plan of Care:     [] Support spiritual and/or cultural needs    [] Support AMD and/or advance care planning process      [] Support grieving process   [] Coordinate Rites and/or Rituals    [] Coordination with community clergy   [] No spiritual needs identified at this time   [] Detailed Plan of Care below (See Comments)  [] Make referral to Music Therapy  [] Make referral to Pet Therapy     [] Make referral to Addiction services  [] Make referral to [facility]  [] Make referral to Spiritual Care Partner  [] No future visits requested        [] Follow up visits as needed     Comments: Responded to request from staff for a  visit. Miss Veena Jensen daughter was at her bedside. Miss Mikey Espinoza affirmed her bill in God, relayed her mind is not what it used to be and shared today is not one of her best days. She was very pleasant and finds strength in her bill. Provided gentle listening presence and assurance of prayer.   Visited by: Ina Tracy [address] [facility] ([id number])

## 2018-05-17 NOTE — CONSULTS
Palliative Medicine Consult  Carlos Enrique: [phone] ([phone])    Patient Name: Neil Metz  YOB: 1934    Date of Initial Consult: 05/17/2018  Reason for Consult: Care decisions  Requesting Provider: Hortencia BASS   Primary Care Physician: Bryant Cisneros MD     SUMMARY:   Neil Metz is a 80 y.o. with a past history of anxiety, GERD, alzheimer's dementia, who was admitted on 5/11/2018 from [facility] with a diagnosis of intra-abdominal abscess. Patient recently admitted at [facility] 4/29 and underwent lap appendectomy for ruptured appendix and was discharged to [facility]  5/3 for continued rehabilitation. Presented to [facility] ED with complaints of generalized abdominal pain with distention. CT scan revealed free air and inflammatory process in the pelvis which seems to be centered around a small bowel loop where there is localized extraluminal gas and mild bowel wall thickening, as well as a few interloop fluid collections suspicious for small abscesses. Patient was admitted for conservative management with broad spectrum antibiotics, as they were too small for US guided drainage Follow-up CT scan on 5/15 showed overall improvement with improved free intraperitoneal air and improved abscesses. Current medical issues leading to Palliative Medicine involvement include: Care decisions due to progressive decline, alzheimer's dementia. SH:  to [facility] for 60 years. Has son Krystle Galaviz and daughter Pratima Wright. PALLIATIVE DIAGNOSES:   1. Agitation  2. Impaired Memory  3. Physical debility  4. Hypoalbuminemia  5. Goals of care  6. DNR       PLAN:   1. Met with patient and introduced the role of Palliative Medicine. 2. Patient and family well known to palliative medicine team from previous admission 4/29-5/3.   3. Patient agitated, frequently crying out \"help\", asking \"what the hell is going on\".  Attempted to review events of previous admission, as well as this admission with her, but patient not receptive, and continues to ask who I am and where her  is. Continue to reorient patient and offer distraction to aid in calming agitation. Psych following as well and medications for dementia have been started as well. 4. Goals of care--> No family at bedside, and I attempted to reach Leon Ocampo with no answer. VM message left. Natasha MEDIAN and I had extensive discussion with  and son Bhavya Vasquez during last admission (~2 weeks ago) and  and family have excellent insight into patient's medical issues- we discussed how her memory issues started approximately a year ago and have progressively worsened to where she has no short term memory. Patient able to ambulate without assistance, dress herself, toilets independently, and feeds herself. Son and  realize the progressive nature of dementia and are aware of the fact that in the future her care needs may increase to the point where Leon Ocampo can not care for her alone. Both explained how they have had other family members with dementia that were diagnosed early (~60's) and needed care well into their 80's. Addressed how the family can best approach her future long term needs and Leon Ocampo is planning on applying for medicaid, considering employing care aids in the home, as well as possibility of pursing LTC in a facility. Introduced the possibility of utilizing hospice services when her dementia progresses to end stage as an added layer of support for patient and family. Per chart review, plan is for patient to return to SNF for continued rehab and eventually return home with Leon Ocampo. 5. DNR--> Extensive discussion with  and son and DDNR has been completed and is on chart. 6. Initial consult note routed to primary continuity provider  7.  Communicated plan of care with: Palliative IDT       GOALS OF CARE / TREATMENT PREFERENCES:     GOALS OF CARE:  Patient/Health Care Proxy Stated Goals: Rehabilitation      TREATMENT PREFERENCES:   Code Status: DNR    Advance Care Planning:  Advance Care Planning 5/17/2018   Patient's Healthcare Decision Maker is: Verbal statement (Legal Next of Kin remains as decision maker)   Primary Decision Maker Name Rm Bell   Primary Decision Maker Phone Number [phone]   Primary Decision Maker Relationship to Patient -   Confirm Advance Directive Yes, not on file   Patient Would Like to Complete Advance Directive -   Does the patient have other document types Do Not Resuscitate       Medical Interventions: Limited additional interventions (DNR)   Other Instructions:   Artificially Administered Nutrition:  (not addressed)     Other:    As far as possible, the palliative care team has discussed with patient / health care proxy about goals of care / treatment preferences for patient. HISTORY:     History obtained from: chart    CHIEF COMPLAINT: abdominal pain    HPI/SUBJECTIVE:    The patient is:   [] Verbal and participatory  [x] Non-participatory due to: dementia    Patient recently admitted at [facility] 4/29 and underwent lap appendectomy for ruptured appendix and was discharged to [facility]  5/3 for continued rehabilitation. Presented to [facility] ED with complaints of generalized abdominal pain with distention. CT scan revealed free air and inflammatory process in the pelvis which seems to be centered around a small bowel loop where there is localized extraluminal gas and mild bowel wall thickening, as well as a few interloop fluid collections suspicious for small abscesses. Patient was admitted for conservative management with broad spectrum antibiotics, as they were too small for US guided drainage Follow-up CT scan on 5/15 showed overall improvement with improved free intraperitoneal air and improved abscesses.       Clinical Pain Assessment (nonverbal scale for severity on nonverbal patients):   Clinical Pain Assessment  Severity: 0     Activity (Movement): Lying quietly, normal position    Duration: for how long has pt been experiencing pain (e.g., 2 days, 1 month, years)  Frequency: how often pain is an issue (e.g., several times per day, once every few days, constant)     FUNCTIONAL ASSESSMENT:     Palliative Performance Scale (PPS):  PPS: 60       PSYCHOSOCIAL/SPIRITUAL SCREENING:     Palliative IDT has assessed this patient for cultural preferences / practices and a referral made as appropriate to needs (Cultural Services, Patient Advocacy, Ethics, etc.)    Advance Care Planning:  Advance Care Planning 5/17/2018   Patient's Healthcare Decision Maker is: Verbal statement (Legal Next of Kin remains as decision maker)   Primary Decision Maker Name Rm Bell   Primary Decision Maker Phone Number [phone]   Primary Decision Maker Relationship to Patient -   Confirm Advance Directive Yes, not on file   Patient Would Like to Complete Advance Directive -   Does the patient have other document types Do Not Resuscitate       Any spiritual / Taoism concerns:  [] Yes /  [x] No    Caregiver Burnout:  [] Yes /  [] No /  [x] No Caregiver Present      Anticipatory grief assessment:   [x] Normal  / [] Maladaptive       ESAS Anxiety: Anxiety: 2    ESAS Depression:          REVIEW OF SYSTEMS:     Positive and pertinent negative findings in ROS are noted above in HPI. The following systems were [] reviewed / [x] unable to be reviewed as noted in HPI  Other findings are noted below. Systems: constitutional, ears/nose/mouth/throat, respiratory, gastrointestinal, genitourinary, musculoskeletal, integumentary, neurologic, psychiatric, endocrine. Positive findings noted below.   Modified ESAS Completed by: provider           Pain: 0   Anxiety: 2       Dyspnea: 0           Stool Occurrence(s): 1        PHYSICAL EXAM:     From RN flowsheet:  Wt Readings from Last 3 Encounters:   05/17/18 130 lb 1.1 oz (59 kg)   05/01/18 130 lb 8.2 oz (59.2 kg)   04/05/18 119 lb (54 kg)     Blood pressure 149/82, pulse 93, temperature 98.9 °F (37.2 °C), resp. rate 17, height 5' 3\" (1.6 m), weight 130 lb 1.1 oz (59 kg), last menstrual period 01/31/1995, SpO2 97 %. Pain Scale 1: Visual  Pain Intensity 1: 0  Pain Onset 1: post op  Pain Location 1: Abdomen  Pain Orientation 1: Anterior  Pain Description 1: Aching  Pain Intervention(s) 1: Medication (see MAR)  Last bowel movement, if known:     Constitutional: Sitting up in chair, NAD  Eyes: pupils equal, anicteric  ENMT: no nasal discharge, moist mucous membranes  Cardiovascular: regular rhythm, distal pulses intact  Respiratory: breathing not labored, symmetric  Gastrointestinal: soft non-tender, +bowel sounds  Musculoskeletal: no deformity, no tenderness to palpation  Skin: warm, dry  Neurologic: following commands, forgetfully, moving all extremities  Psychiatric: anxious affect, agitated, no hallucinations  Other:       HISTORY:     Active Problems:    Intra-abdominal abscess (HCC) (5/11/2018)      Past Medical History:   Diagnosis Date    Alzheimer disease     Anxiety 4/23/2010    GERD (gastroesophageal reflux disease) 4/23/2010    History of vascular access device 05/16/2018    PICC line double lumen for TPN, R basilic    Mild dementia     UTI (lower urinary tract infection) 4/23/2010      Past Surgical History:   Procedure Laterality Date    ENDOSCOPY, COLON, DIAGNOSTIC      HX CATARACT REMOVAL  5/2011    left    HX UROLOGICAL      bladder uplift      Family History   Problem Relation Age of Onset    Cancer Father       History reviewed, no pertinent family history.   Social History   Substance Use Topics    Smoking status: Never Smoker    Smokeless tobacco: Never Used    Alcohol use No     Allergies   Allergen Reactions    Macrodantin [Nitrofurantoin Macrocrystalline] Rash    Pcn [Penicillins] Rash    Sulfa (Sulfonamide Antibiotics) Rash      Current Facility-Administered Medications   Medication Dose Route Frequency    sodium chloride (NS) flush 10-30 mL  10-30 mL InterCATHeter PRN    sodium chloride (NS) flush 10 mL  10 mL InterCATHeter Q24H    sodium chloride (NS) flush 10 mL  10 mL InterCATHeter PRN    sodium chloride (NS) flush 10-40 mL  10-40 mL InterCATHeter Q8H    sodium chloride (NS) flush 20 mL  20 mL InterCATHeter Q24H    alteplase (CATHFLO) 1 mg in sterile water (preservative free) 1 mL injection  1 mg InterCATHeter PRN    cefTRIAXone (ROCEPHIN) 2 g in 0.9% sodium chloride (MBP/ADV) 50 mL  2 g IntraVENous Q24H    bisacodyl (DULCOLAX) suppository 10 mg  10 mg Rectal DAILY    acetaminophen (TYLENOL) tablet 650 mg  650 mg Oral Q6H PRN    HYDROcodone-acetaminophen (NORCO) 5-325 mg per tablet 1 Tab  1 Tab Oral Q6H PRN    metroNIDAZOLE (FLAGYL) IVPB premix 500 mg  500 mg IntraVENous Q12H    busPIRone (BUSPAR) tablet 5 mg  5 mg Oral TID WITH MEALS    QUEtiapine (SEROquel) tablet 25 mg  25 mg Oral QHS    sodium chloride (NS) flush 5-10 mL  5-10 mL IntraVENous Q8H    sodium chloride (NS) flush 5-10 mL  5-10 mL IntraVENous PRN    naloxone (NARCAN) injection 0.4 mg  0.4 mg IntraVENous PRN    ondansetron (ZOFRAN) injection 4 mg  4 mg IntraVENous Q4H PRN    docusate sodium (COLACE) capsule 100 mg  100 mg Oral BID    enoxaparin (LOVENOX) injection 40 mg  40 mg SubCUTAneous Q24H          LAB AND IMAGING FINDINGS:     Lab Results   Component Value Date/Time    WBC 9.2 05/17/2018 03:40 AM    HGB 11.8 05/17/2018 03:40 AM    PLATELET 111 53/99/5423 03:40 AM     Lab Results   Component Value Date/Time    Sodium 135 (L) 05/17/2018 03:40 AM    Potassium 4.1 05/17/2018 03:40 AM    Chloride 101 05/17/2018 03:40 AM    CO2 27 05/17/2018 03:40 AM    BUN 3 (L) 05/17/2018 03:40 AM    Creatinine 0.58 05/17/2018 03:40 AM    Calcium 9.1 05/17/2018 03:40 AM    Magnesium 1.8 05/17/2018 03:40 AM    Phosphorus 1.6 (L) 05/03/2018 12:36 AM      Lab Results   Component Value Date/Time    AST (SGOT) 20 05/11/2018 09:35 AM    Alk.  phosphatase 60 05/11/2018 09:35 AM    Protein, total 6.4 05/11/2018 09:35 AM    Albumin 2.4 (L) 05/11/2018 09:35 AM    Globulin 4.0 05/11/2018 09:35 AM     Lab Results   Component Value Date/Time    INR 1.1 06/30/2010 12:00 PM    Prothrombin time 10.9 06/30/2010 12:00 PM      No results found for: IRON, FE, TIBC, IBCT, PSAT, FERR   No results found for: PH, PCO2, PO2  No components found for: GLPOC   No results found for: CPK, CKMB             Total time: 30 min  Counseling / coordination time, spent as noted above: 20 min  > 50% counseling / coordination?: yes    Prolonged service was provided for  []30 min   []75 min in face to face time in the presence of the patient, spent as noted above. Time Start:   Time End:   Note: this can only be billed with 93678 (initial) or 72141 (follow up). If multiple start / stop times, list each separately.

## 2018-05-17 NOTE — PROGRESS NOTES
Problem: Falls - Risk of  Goal: *Absence of Falls  Document Abhi Fall Risk and appropriate interventions in the flowsheet. Outcome: Progressing Towards Goal  Fall Risk Interventions:       Mentation Interventions: Bed/chair exit alarm, Adequate sleep, hydration, pain control, Door open when patient unattended, More frequent rounding, Reorient patient, Update white board, Room close to nurse's station    Medication Interventions: Bed/chair exit alarm, Patient to call before getting OOB, Teach patient to arise slowly    Elimination Interventions: Bed/chair exit alarm, Call light in reach, Patient to call for help with toileting needs             Problem: Pressure Injury - Risk of  Goal: *Prevention of pressure injury  Document Dimitris Scale and appropriate interventions in the flowsheet.    Outcome: Progressing Towards Goal  Pressure Injury Interventions:  Sensory Interventions: Check visual cues for pain, Float heels, Assess need for specialty bed    Moisture Interventions: Absorbent underpads, Internal/External urinary devices, Check for incontinence Q2 hours and as needed    Activity Interventions: Pressure redistribution bed/mattress(bed type), PT/OT evaluation    Mobility Interventions: HOB 30 degrees or less, Pressure redistribution bed/mattress (bed type), PT/OT evaluation, Float heels    Nutrition Interventions: Document food/fluid/supplement intake    Friction and Shear Interventions: HOB 30 degrees or less, Apply protective barrier, creams and emollients

## 2018-05-17 NOTE — PROGRESS NOTES
Nutrition Assessment:    RECOMMENDATIONS/INTERVENTION(S):   1. Continue NDD 2 diet, encouraging PO intake. 2. Modified supplement to Ensure Enlive for increased caloric concentration. 3. RD will continue to monitor patient. ASSESSMENT:   5/17: Saw patient for length of stay x 5 days and dysphagia diet level 2. Patient admitted for intra-abdominal abscess. PMHx of dementia, GERD and UTI. Na 135, BG controlled 108-100-107. Patient unable to provide detailed history due to confusion related to dementia. Stated she was not experiencing nausea and had a fair appetite. Appears she had a 25-50% of her meal from lunch and a half a bottle of Pepsi at bedside. Nursing reported fair intake, picks at food. RN changed diet from mechanical soft to NDD 2 due to patient having difficulty \"gumming food\" with missing dentition and dentures at home. From chart review, wt appears stable. Pt likely not meeting needs given observed intake and increased nutrient needs with wound healing.       SUBJECTIVE/OBJECTIVE:   Diet Order:    % Eaten:  Patient Vitals for the past 72 hrs:   % Diet Eaten   05/16/18 1223 20 %   05/16/18 1006 30 %     Pertinent Medications: [x] Reviewed-seroquel, dulcolax  Past Medical History:   Diagnosis Date    Alzheimer disease     Anxiety 4/23/2010    GERD (gastroesophageal reflux disease) 4/23/2010    History of vascular access device 05/16/2018    PICC line double lumen for TPN, R basilic    Mild dementia     UTI (lower urinary tract infection) 4/23/2010       Chemistries:  Lab Results   Component Value Date/Time    Sodium 135 (L) 05/17/2018 03:40 AM    Potassium 4.1 05/17/2018 03:40 AM    Chloride 101 05/17/2018 03:40 AM    CO2 27 05/17/2018 03:40 AM    Anion gap 7 05/17/2018 03:40 AM    Glucose 100 05/17/2018 03:40 AM    BUN 3 (L) 05/17/2018 03:40 AM    Creatinine 0.58 05/17/2018 03:40 AM    BUN/Creatinine ratio 5 (L) 05/17/2018 03:40 AM    GFR est AA >60 05/17/2018 03:40 AM    GFR est non-AA >60 05/17/2018 03:40 AM    Calcium 9.1 05/17/2018 03:40 AM    AST (SGOT) 20 05/11/2018 09:35 AM    Alk. phosphatase 60 05/11/2018 09:35 AM    Protein, total 6.4 05/11/2018 09:35 AM    Albumin 2.4 (L) 05/11/2018 09:35 AM    Globulin 4.0 05/11/2018 09:35 AM    A-G Ratio 0.6 (L) 05/11/2018 09:35 AM    ALT (SGPT) 14 05/11/2018 09:35 AM      Anthropometrics: Height: 5' 3\" (160 cm) Weight: 59 kg (130 lb 1.1 oz)    IBW (%IBW): 57.5 kg (126 lb 12.2 oz) (102.61 %) UBW (%UBW): 59 kg (130 lb) (100.05 %)    BMI: Body mass index is 23.04 kg/(m^2). This BMI is indicative of:   [] Underweight    [x] Normal    [] Overweight    []  Obesity    []  Extreme Obesity (BMI>40)  Estimated Nutrition Needs (Based on): 1318 Kcals/day (1014 x 1.3 AF) , 59 g (71 (1.0-1.2g.kg actual bw)) Protein  Carbohydrate:  At Least 130 g/day  Fluids: 7196-3851 mL/day (1 ml/kcal)    Last BM: hypoactive   []Active     []Hyperactive  [x]Hypoactive       [] Absent   BS  Skin:    [] Intact   [x] Incision-abdomen  [] Breakdown   [] DTI   [] Tears/Excoriation/Abrasion  [x]Edema-trace, non-pitting BLE [x] Other: wound on bilateral feet   Wt Readings from Last 30 Encounters:   05/17/18 59 kg (130 lb 1.1 oz)   05/01/18 59.2 kg (130 lb 8.2 oz)   04/05/18 54 kg (119 lb)   07/25/17 58.1 kg (128 lb)   03/28/16 59.9 kg (132 lb)   02/02/16 60 kg (132 lb 4 oz)   12/30/15 60.1 kg (132 lb 9.6 oz)   11/03/15 58.8 kg (129 lb 9.6 oz)   10/23/15 59 kg (130 lb)   08/17/15 57.6 kg (127 lb)   06/09/15 56.7 kg (125 lb)   04/10/15 57.2 kg (126 lb)   11/18/14 59 kg (130 lb)   01/03/14 54 kg (119 lb)   10/19/13 57.3 kg (126 lb 6.4 oz)   07/11/13 57.8 kg (127 lb 8 oz)   07/01/13 57.6 kg (127 lb)   03/25/13 60.8 kg (134 lb)   03/05/13 60.3 kg (133 lb)   11/06/12 60.3 kg (133 lb)   10/11/12 60.3 kg (132 lb 14.4 oz)   10/04/12 59.9 kg (132 lb)   09/27/12 59.4 kg (131 lb)   09/20/12 59.8 kg (131 lb 14.4 oz)   09/18/12 60.3 kg (133 lb)   09/10/12 59.6 kg (131 lb 8 oz)   05/15/12 60.9 kg (134 lb 4 oz)   02/09/12 63 kg (138 lb 12.8 oz)   12/19/11 61.9 kg (136 lb 6 oz)   11/01/11 61.7 kg (136 lb)      NUTRITION DIAGNOSES:   Problem:  Inadequate oral intake     Etiology: related to conditions related to dx: intra abdominal abscess     Signs/Symptoms: as evidenced by consuming 25-50% of trays, reports of decreased appetite x 5 days      NUTRITION INTERVENTIONS:  Meals/Snacks: General/healthful diet   Supplements: Commercial supplement              GOAL:   Patient to consume >/=50% of meals and ONS in the next 2-4 days    Cultural, Muslim, or Ethnic Dietary Needs: None     EDUCATION & DISCHARGE NEEDS:    [x] None Identified   [] Identified and Education Provided/Documented   [] Identified and Pt declined/was not appropriate      [x] Interdisciplinary Care Plan Reviewed/Documented    [x] Discharge Needs: TBD   [] No Nutrition Related Discharge Needs    NUTRITION RISK:   Pt Is At Nutrition Risk  [x]     No Nutrition Risk Identified  []       PT SEEN FOR:    []  MD Consult: []Calorie Count      []Diabetic Diet Education        []Diet Education     []Electrolyte Management     []General Nutrition Management and Supplements     []Management of Tube Feeding     []TPN Recommendations    []  RN Referral:  []MST score >=2     []Enteral/Parenteral Nutrition PTA     []Pregnant: Gestational DM or Multigestation                 [] Pressure Ulcer    []  Low BMI      [x]  Length of Stay       [x] Dysphagia Diet         [] Ventilator  []  Follow-up     Previous Recommendations:   [] Implemented          [] Not Implemented          [x] Not Applicable    Previous Goal:   [] Met              [] Progressing Towards Goal              [] Not Progressing Towards Goal   [x] Not Applicable            Kasey Salas, [address] [place] [address]  Pager [phone]  Office [phone]

## 2018-05-17 NOTE — PROGRESS NOTES
Shift change report given to Eusebio (oncoming nurse) by anish (offgoing nurse). Report included the following information SBAR, Kardex, Procedure Summary, Intake/Output, MAR and Recent Results.

## 2018-05-17 NOTE — ROUTINE PROCESS
Bedside and Verbal shift change report given to VANDANA Hough (oncoming nurse) by Juan Man RN (offgoing nurse). Report included the following information SBAR, Kardex, ED Summary, OR Summary, Procedure Summary, Intake/Output, MAR and Recent Results.

## 2018-05-17 NOTE — PROGRESS NOTES
Problem: Pressure Injury - Risk of  Goal: *Prevention of pressure injury  Document Dimitris Scale and appropriate interventions in the flowsheet.    Outcome: Progressing Towards Goal  Pressure Injury Interventions:  Sensory Interventions: Check visual cues for pain, Float heels    Moisture Interventions: Absorbent underpads    Activity Interventions: Assess need for specialty bed, Increase time out of bed, Pressure redistribution bed/mattress(bed type)    Mobility Interventions: HOB 30 degrees or less    Nutrition Interventions: Discuss nutritional consult with provider    Friction and Shear Interventions: Lift sheet, HOB 30 degrees or less

## 2018-05-17 NOTE — PROGRESS NOTES
Psychiatric Follow-Up  Dr. Dave Garza, Psychiatrist, [facility]    Collin Araya female 80 y.o. Interval History:  B12 281, Folate and homocysteine wnl, no MMA result yet. Patient without sitter. She is calm. She continues to be confused, tangential and disoriented. No reports of significant agitation or combativeness. ROS:  Gen - No c/o pain. Psych - does not appear to be responding to internal stimuli.     Current Inpatient Medications:  Current Facility-Administered Medications   Medication Dose Route Frequency    sodium chloride (NS) flush 10-30 mL  10-30 mL InterCATHeter PRN    sodium chloride (NS) flush 10 mL  10 mL InterCATHeter Q24H    sodium chloride (NS) flush 10 mL  10 mL InterCATHeter PRN    sodium chloride (NS) flush 10-40 mL  10-40 mL InterCATHeter Q8H    sodium chloride (NS) flush 20 mL  20 mL InterCATHeter Q24H    alteplase (CATHFLO) 1 mg in sterile water (preservative free) 1 mL injection  1 mg InterCATHeter PRN    cefTRIAXone (ROCEPHIN) 2 g in 0.9% sodium chloride (MBP/ADV) 50 mL  2 g IntraVENous Q24H    bisacodyl (DULCOLAX) suppository 10 mg  10 mg Rectal DAILY    acetaminophen (TYLENOL) tablet 650 mg  650 mg Oral Q6H PRN    HYDROcodone-acetaminophen (NORCO) 5-325 mg per tablet 1 Tab  1 Tab Oral Q6H PRN    metroNIDAZOLE (FLAGYL) IVPB premix 500 mg  500 mg IntraVENous Q12H    busPIRone (BUSPAR) tablet 5 mg  5 mg Oral TID WITH MEALS    QUEtiapine (SEROquel) tablet 25 mg  25 mg Oral QHS    sodium chloride (NS) flush 5-10 mL  5-10 mL IntraVENous Q8H    sodium chloride (NS) flush 5-10 mL  5-10 mL IntraVENous PRN    naloxone (NARCAN) injection 0.4 mg  0.4 mg IntraVENous PRN    ondansetron (ZOFRAN) injection 4 mg  4 mg IntraVENous Q4H PRN    docusate sodium (COLACE) capsule 100 mg  100 mg Oral BID    enoxaparin (LOVENOX) injection 40 mg  40 mg SubCUTAneous Q24H        Mental Status Examination:  Level of Consciousness:  Alert  Orientation: Oriented only to self  Appearance:  Casual grooming  Cooperation:  Cooperative - attempts  Speech:  Normal rate/rhythm  Mood:  \"fine\"  Affect:  Elihue Rough although smiles at times  Thought process:  tangential  Suicidal Ideation:  None  Homicidal Ideation:  None  Perceptual Disturbances:  None  Memory:  impaired  Judgement/Insight:  impaired  Attention/Concentration:  impaired  Psychomotor Agitation:  None    Vital Signs:    Visit Vitals    /69 (BP 1 Location: Left arm, BP Patient Position: At rest)    Pulse 85    Temp 97.9 °F (36.6 °C)    Resp 16    Ht 5' 3\" (1.6 m)    Wt 59 kg (130 lb)    SpO2 97%    BMI 23.03 kg/m2       Pertinent Labs/Studies:    Recent Results (from the past 24 hour(s))   HOMOCYSTEINE, PLASMA    Collection Time: 05/17/18  3:40 AM   Result Value Ref Range    Homocysteine, plasma 9.3 3.7 - 13.9 umol/L   FOLATE    Collection Time: 05/17/18  3:40 AM   Result Value Ref Range    Folate 9.1 5.0 - 21.0 ng/mL   CBC W/O DIFF    Collection Time: 05/17/18  3:40 AM   Result Value Ref Range    WBC 9.2 3.6 - 11.0 K/uL    RBC 4.07 3.80 - 5.20 M/uL    HGB 11.8 11.5 - 16.0 g/dL    HCT 36.7 35.0 - 47.0 %    MCV 90.2 80.0 - 99.0 FL    MCH 29.0 26.0 - 34.0 PG    MCHC 32.2 30.0 - 36.5 g/dL    RDW 13.3 11.5 - 14.5 %    PLATELET 966 075 - 854 K/uL    MPV 9.7 8.9 - 12.9 FL    NRBC 0.0 0  WBC    ABSOLUTE NRBC 0.00 0.00 - 5.73 K/uL   METABOLIC PANEL, BASIC    Collection Time: 05/17/18  3:40 AM   Result Value Ref Range    Sodium 135 (L) 136 - 145 mmol/L    Potassium 4.1 3.5 - 5.1 mmol/L    Chloride 101 97 - 108 mmol/L    CO2 27 21 - 32 mmol/L    Anion gap 7 5 - 15 mmol/L    Glucose 100 65 - 100 mg/dL    BUN 3 (L) 6 - 20 MG/DL    Creatinine 0.58 0.55 - 1.02 MG/DL    BUN/Creatinine ratio 5 (L) 12 - 20      GFR est AA >60 >60 ml/min/1.73m2    GFR est non-AA >60 >60 ml/min/1.73m2    Calcium 9.1 8.5 - 10.1 MG/DL   MAGNESIUM    Collection Time: 05/17/18  3:40 AM   Result Value Ref Range    Magnesium 1.8 1.6 - 2.4 mg/dL   GLUCOSE, POC    Collection Time: 05/17/18  7:22 AM   Result Value Ref Range    Glucose (POC) 108 (H) 65 - 100 mg/dL    Performed by Erich Cummings (PCT)        Impression:  80 year old female with severe dementia diagnosed previously as Alzheimer's type.  Agitation appears to have improved on Seroquel and Buspar.      Psychiatric Diagnoses:  Major Neurocognitive Disorder (Alzheimer's dementia)      Plan: 1.  Continue Buspar and Seroquel as above - r/b/se including bb warnings have been dw . 2.  Once patient's abdominal pain has improved and she is eating with normal bowel movements (either at discharge or at outpatient fu if symptoms not resolved at discharge as Aricept has common GI side effects), would recommend starting Aricept 5 mg po every day.  I have discussed this with her  including r/b/se and have dw neurology team.  DD interactions reviewed. QTc on 511 = 456. She should have follow up for this with psychiatry or neurology as an outpatient shortly after discharge. 3.  Continue nonpharmacologic treatments for any agitation.  Routines, distraction, calm reassurance, wearing eyeglasses  can be very helpful.    4. B12 281 but homocysteine and folate wnl (MMA pending) so unlikely to be B12 deficient - would recommend following with primary care as outpatient.       30 minutes spent on floor.  >50% in counseling/coordination of care activities (spoke with patient, reviewed chart, charted). I will sign off. Please call with further concerns. Tone Gardner.  VARSHA Garcia.

## 2018-05-17 NOTE — PROGRESS NOTES
5/17/2018 2:46 PM Arranged ambulance transport for 11am on 5/18 to [facility]. Called and notified pt's . Lvm with pt's daughter Willy Mata. 5/17/2018  10:14 AM [facility] has accepted pt. Spoke with gen surg brent BASS for discharge on 5/18. Confirmed with Darryl Cords in admissions at [facility] they can accept pt in AM on 5/18. CM will coordinate with pt's family for transport time, pt will need ambulance transport.  Amairani Perez, BSW

## 2018-05-17 NOTE — PROGRESS NOTES
Psychiatry follow up    Vit B12 281 - borderline low. This will need follow up. I ordered MMA, folate, and homocysteine levels. Will follow up with patient and on these levels tomorrow.

## 2018-05-17 NOTE — PROGRESS NOTES
Problem: Falls - Risk of  Goal: *Absence of Falls  Document Abhi Fall Risk and appropriate interventions in the flowsheet. Outcome: Progressing Towards Goal  Fall Risk Interventions:       Mentation Interventions: Bed/chair exit alarm, Adequate sleep, hydration, pain control, Door open when patient unattended, More frequent rounding, Reorient patient, Room close to nurse's station, Update white board    Medication Interventions: Bed/chair exit alarm, Patient to call before getting OOB, Teach patient to arise slowly    Elimination Interventions: Bed/chair exit alarm, Call light in reach, Patient to call for help with toileting needs             Problem: Pressure Injury - Risk of  Goal: *Prevention of pressure injury  Document Dimitris Scale and appropriate interventions in the flowsheet.    Outcome: Progressing Towards Goal  Pressure Injury Interventions:  Sensory Interventions: Check visual cues for pain, Float heels    Moisture Interventions: Absorbent underpads    Activity Interventions: Increase time out of bed, Pressure redistribution bed/mattress(bed type), PT/OT evaluation    Mobility Interventions: HOB 30 degrees or less, Pressure redistribution bed/mattress (bed type), PT/OT evaluation, Float heels    Nutrition Interventions: Document food/fluid/supplement intake    Friction and Shear Interventions: HOB 30 degrees or less

## 2018-05-17 NOTE — PROGRESS NOTES
[facility] Infectious Disease Specialists Progress Note           Bernie Dale DO    [phone] Office  [phone]  Fax    2018      Assessment & Plan:   1. Postop intraabdominal abscesses due to perforated appendicitis. Failed(?) outpatient levofloxicin and metronidazole. Plan additional 4-week course of ceftriaxone/metro due to these intraabdominal abscesses which are not amenable to percutaneous drainage. She will follow up with surgery in the outpatient setting. IV antibiotic orders are in the chart. 2. Dementia. 3. Candiduria, only 15,000 colonies per mL isolated in the urine culture. No treatment planned. 4. HISTORY OF MACRODANTIN, PENICILLIN AND SULFA ALLERGIES. Subjective:     Confused    Objective:     Vitals:   Visit Vitals    /69 (BP 1 Location: Left arm, BP Patient Position: At rest)    Pulse 83    Temp 97.9 °F (36.6 °C)    Resp 16    Ht 5' 3\" (1.6 m)    Wt 130 lb (59 kg)    LMP 1995    SpO2 97%    BMI 23.03 kg/m2        Tmax:  Temp (24hrs), Av.9 °F (36.6 °C), Min:97.7 °F (36.5 °C), Max:98.1 °F (36.7 °C)      Exam:   Patient is intubated:  no    Physical Examination:   General:  Alert, cooperative, no distress   Head:  Normocephalic, atraumatic. Eyes:  Conjunctivae clear   Neck: Supple       Lungs:   No distress. Clear to auscultation bilaterally. Chest wall:     Heart:  Regular rate and rhythm, S1, S2 normal, no murmur   Abdomen:   Soft, non-tender, non-distended   Extremities: Moves all. No cyanosis or edema. Skin:  No rashes or lesions   Neurologic: CNII-XII intact. Labs:        No lab exists for component: ITNL   No results for input(s): CPK, CKMB, TROIQ in the last 72 hours.   Recent Labs      18   0340  18   0526  05/15/18   0300   NA  135*  135*  135*   K  4.1  4.1  4.2   CL  101  103  101   CO2  27  25  26   BUN  3*  1*  1*   CREA  0.58  0.58  0.49*   GLU  100  107*  104*   MG  1.8  1.8  1.8   WBC  9.2  6.7  7.1   HGB 11.8  11.1*  10.2*   HCT  36.7  35.3  32.4*   PLT  378  409*  379     No results for input(s): INR, PTP, APTT in the last 72 hours. No lab exists for component: INREXT  Needs: urine analysis, urine sodium, protein and creatinine  No results found for: ANGELY, CREAU      Cultures:     Lab Results   Component Value Date/Time    Specimen Description: URINE 01/18/2012 05:00 PM    Specimen Description: URINE 10/20/2010 11:35 AM    Specimen Description: URINE 08/31/2010 10:51 AM     Lab Results   Component Value Date/Time    Culture result: CANDIDA TROPICALIS (A) 05/11/2018 11:36 AM    Culture result: NO GROWTH 1 DAY 04/30/2018 05:04 AM    Culture result: MRSA NOT PRESENT 04/30/2018 04:54 AM    Culture result:  04/30/2018 04:54 AM         Screening of patient nares for MRSA is for surveillance purposes and, if positive, to facilitate isolation considerations in high risk settings. It is not intended for automatic decolonization interventions per se as regimens are not sufficiently effective to warrant routine use.        Radiology:     Medications       Current Facility-Administered Medications   Medication Dose Route Frequency Last Dose    sodium chloride (NS) flush 10-30 mL  10-30 mL InterCATHeter PRN      sodium chloride (NS) flush 10 mL  10 mL InterCATHeter Q24H 10 mL at 05/16/18 1354    sodium chloride (NS) flush 10 mL  10 mL InterCATHeter PRN      sodium chloride (NS) flush 10-40 mL  10-40 mL InterCATHeter Q8H 10 mL at 05/17/18 0527    sodium chloride (NS) flush 20 mL  20 mL InterCATHeter Q24H 20 mL at 05/16/18 1354    alteplase (CATHFLO) 1 mg in sterile water (preservative free) 1 mL injection  1 mg InterCATHeter PRN      cefTRIAXone (ROCEPHIN) 2 g in 0.9% sodium chloride (MBP/ADV) 50 mL  2 g IntraVENous Q24H 2 g at 05/16/18 1830    bisacodyl (DULCOLAX) suppository 10 mg  10 mg Rectal DAILY 10 mg at 05/17/18 0910    acetaminophen (TYLENOL) tablet 650 mg  650 mg Oral Q6H  mg at 05/14/18 1148    HYDROcodone-acetaminophen (NORCO) 5-325 mg per tablet 1 Tab  1 Tab Oral Q6H PRN 1 Tab at 05/17/18 0702    metroNIDAZOLE (FLAGYL) IVPB premix 500 mg  500 mg IntraVENous Q12H 500 mg at 05/17/18 0039    busPIRone (BUSPAR) tablet 5 mg  5 mg Oral TID WITH MEALS 5 mg at 05/17/18 0910    QUEtiapine (SEROquel) tablet 25 mg  25 mg Oral QHS 25 mg at 05/16/18 2115    sodium chloride (NS) flush 5-10 mL  5-10 mL IntraVENous Q8H 10 mL at 05/17/18 0528    sodium chloride (NS) flush 5-10 mL  5-10 mL IntraVENous PRN      naloxone (NARCAN) injection 0.4 mg  0.4 mg IntraVENous PRN      ondansetron (ZOFRAN) injection 4 mg  4 mg IntraVENous Q4H PRN 4 mg at 05/13/18 2117    docusate sodium (COLACE) capsule 100 mg  100 mg Oral  mg at 05/17/18 0910    enoxaparin (LOVENOX) injection 40 mg  40 mg SubCUTAneous Q24H 40 mg at 05/17/18 3846           Case discussed with:      Dawna Coley DO

## 2018-05-17 NOTE — PROGRESS NOTES
Urology consult written for urinary retention. Pt admitted 5/11 with abdominal abscess after perforated appendix repair 4/30. Denise placed on 5/11 due to bladder scan >1000 ml. Denise d/c 5/16 however bladder scan today shows 770 ml and pt was straight cathed. Cr WNL    Pt has seen Dr. Paulo Metzger of [facility] in the past.    Pt expected to be discharged to SNF tomorrow. Per Dr. Paulo Metzger, replace denise catheter and pt should f/u up with uro/gyn. I will make appt and put on discharge instructions. Urology will not see patient at bedside.     Reynaldo Gibson  RN,  Urology RN Coordinator, [phone]

## 2018-05-18 NOTE — ROUTINE PROCESS
Bedside and Verbal shift change report given to VANDANA Hough (oncoming nurse) by Reva Chappell RN (offgoing nurse). Report included the following information SBAR, Kardex, ED Summary, Procedure Summary, Intake/Output, MAR and Recent Results.

## 2018-05-18 NOTE — PROGRESS NOTES
Follow up visit with Miss Wang Brown. Her  of 61 + years and her daughter were in the room. Miss Wang Brown was chatty today. At one point she seemed to recognize me then did not. Provided spiritual supportive presence as she shared. Provided assurance of prayer.   Visited by: Tavon Monet [id number] [facility] Alex Millard ([id number])

## 2018-05-18 NOTE — PROGRESS NOTES
GI Note    Consult received. Patient has been recently evaluated by Marc. I have notified their group of consult.     Magda Greene PA-C  05/18/18  8:42 AM

## 2018-05-18 NOTE — PROGRESS NOTES
General Surgery Daily Progress Note    Patient: Neri Albert MRN: [id number]  SSN: [id number]    YOB: 1934  Age: 80 y.o. Sex: female      Admit Date: 5/11/2018    Subjective:   Alert, confused. Reported passed some blood last night, unclear if this was from rectum or vagina. Current Facility-Administered Medications   Medication Dose Route Frequency    sodium chloride (NS) flush 10-30 mL  10-30 mL InterCATHeter PRN    sodium chloride (NS) flush 10 mL  10 mL InterCATHeter Q24H    sodium chloride (NS) flush 10 mL  10 mL InterCATHeter PRN    sodium chloride (NS) flush 10-40 mL  10-40 mL InterCATHeter Q8H    sodium chloride (NS) flush 20 mL  20 mL InterCATHeter Q24H    alteplase (CATHFLO) 1 mg in sterile water (preservative free) 1 mL injection  1 mg InterCATHeter PRN    cefTRIAXone (ROCEPHIN) 2 g in 0.9% sodium chloride (MBP/ADV) 50 mL  2 g IntraVENous Q24H    bisacodyl (DULCOLAX) suppository 10 mg  10 mg Rectal DAILY    acetaminophen (TYLENOL) tablet 650 mg  650 mg Oral Q6H PRN    HYDROcodone-acetaminophen (NORCO) 5-325 mg per tablet 1 Tab  1 Tab Oral Q6H PRN    metroNIDAZOLE (FLAGYL) IVPB premix 500 mg  500 mg IntraVENous Q12H    busPIRone (BUSPAR) tablet 5 mg  5 mg Oral TID WITH MEALS    QUEtiapine (SEROquel) tablet 25 mg  25 mg Oral QHS    sodium chloride (NS) flush 5-10 mL  5-10 mL IntraVENous Q8H    sodium chloride (NS) flush 5-10 mL  5-10 mL IntraVENous PRN    naloxone (NARCAN) injection 0.4 mg  0.4 mg IntraVENous PRN    ondansetron (ZOFRAN) injection 4 mg  4 mg IntraVENous Q4H PRN    docusate sodium (COLACE) capsule 100 mg  100 mg Oral BID        Objective:   05/18 0701 - 05/18 1900  In: 750 [P.O.:350;  I.V.:400]  Out: -   05/16 1901 - 05/18 0700  In: 300 [P.O.:300]  Out: 1475 [Urine:1475]  Patient Vitals for the past 8 hrs:   BP Temp Pulse Resp SpO2   05/18/18 0917 136/65 98.2 °F (36.8 °C) 85 16 96 %   05/18/18 0322 117/64 98.7 °F (37.1 °C) 89 16 95 %       Physical Exam:  General: Alert, confused  Lungs: Unlabored  Heart:  Regular rate and  rhythm  Abdomen: Soft, mild lower abdominal tenderness, non-distended. + bowel sounds. No visible blood on external vaginal exam. Dark red on ARACELI. Extremities: Warm, moves all, no edema  Skin:  Warm and dry, no rash    Labs:   Recent Labs      05/18/18   0258   WBC  7.3   HGB  11.2*   HCT  35.2   PLT  379     Recent Labs      05/18/18 0258  05/17/18   0340   NA   --   135*   K   --   4.1   CL   --   101   CO2   --   27   GLU   --   100   BUN   --   3*   CREA   --   0.58   CA   --   9.1   MG  1.8  1.8       Assessment / Plan:   · Intra-abdominal abscess  · WBC normalized, afebrile, no peritonitis   · Repeat CT shows improvement in abscess and colonic inflammation   · Will be discharged on IV ABX x 4 weeks per ID recommendations   · Continue diet   · Mobilize as able. PT consult. · Urinary retention: continue denise, outpatient f/u with Dr. Kyle Mireles   · [address] psychiatry eval. Will continue current meds. · Rectal bleeding: Hgb and vitals stable. GI to see.    · Discharge on hold d/t bleeding

## 2018-05-18 NOTE — PROGRESS NOTES
2112: pt complaining of feeling wet between her legs, upon examination pt's brief was filled with dark red blood, having several dime sized clots. Unclear if blood is coming from vigina or rectum. Will call provider. 2137: spoke with GI specialist Dr. Argenis Hagan, explained that the pt has been found with dark red bleeding with dime sized clots from lower region x2. HGB and HCT are stable. HR and BP are stable, and PT/INR are pending. No new orders given, told he will see pt in AM, and instructed to call back if pt begins to become unstable.

## 2018-05-18 NOTE — PROGRESS NOTES
Problem: Falls - Risk of  Goal: *Absence of Falls  Document Abhi Fall Risk and appropriate interventions in the flowsheet. Outcome: Progressing Towards Goal  Fall Risk Interventions:       Mentation Interventions: Adequate sleep, hydration, pain control, Bed/chair exit alarm, Door open when patient unattended, More frequent rounding, Reorient patient, Room close to nurse's station, Update white board    Medication Interventions: Bed/chair exit alarm, Patient to call before getting OOB, Teach patient to arise slowly    Elimination Interventions: Bed/chair exit alarm, Call light in reach, Patient to call for help with toileting needs             Problem: Pressure Injury - Risk of  Goal: *Prevention of pressure injury  Document Dimitris Scale and appropriate interventions in the flowsheet.    Outcome: Progressing Towards Goal  Pressure Injury Interventions:  Sensory Interventions: Check visual cues for pain, Float heels    Moisture Interventions: Absorbent underpads    Activity Interventions: Increase time out of bed, Pressure redistribution bed/mattress(bed type), PT/OT evaluation    Mobility Interventions: HOB 30 degrees or less, Pressure redistribution bed/mattress (bed type), PT/OT evaluation, Float heels    Nutrition Interventions: Document food/fluid/supplement intake    Friction and Shear Interventions: Lift sheet, HOB 30 degrees or less

## 2018-05-18 NOTE — PROGRESS NOTES
6:45 PM  When attempting to insert Ryder, adult diaper had a moderate amount of thick blood with several dime/quarter size blood clots. Clots did not appear to be coming from rectum. Witness by [facility] RN and Stephanie George. Urine from Ryder did not contain blood. Phone call to Dr. Kelli Ngo to inform, new orders received.

## 2018-05-18 NOTE — PROGRESS NOTES
[facility] Infectious Disease Specialists Progress Note           Dawna Coley DO    [phone] Office  [phone]  Fax    2018      Assessment & Plan:   1. Postop intraabdominal abscesses due to perforated appendicitis. Failed(?) outpatient levofloxicin and metronidazole. Plan additional 4-week course of ceftriaxone/metro due to these intraabdominal abscesses which are not amenable to percutaneous drainage. She will follow up with surgery in the outpatient setting. IV antibiotic orders are in the chart. 2. Dementia. 3. Candiduria, only 15,000 colonies per mL isolated in the urine culture. No treatment planned. 4. HISTORY OF MACRODANTIN, PENICILLIN AND SULFA ALLERGIES. Subjective:     Confused    Objective:     Vitals:   Visit Vitals    /77 (BP 1 Location: Left arm, BP Patient Position: At rest)    Pulse 84    Temp 98.9 °F (37.2 °C)    Resp 16    Ht 5' 3\" (1.6 m)    Wt 130 lb 1.1 oz (59 kg)    LMP 1995    SpO2 97%    BMI 23.04 kg/m2        Tmax:  Temp (24hrs), Av.6 °F (37 °C), Min:98.2 °F (36.8 °C), Max:98.9 °F (37.2 °C)      Exam:   Patient is intubated:  no    Physical Examination:   General:  Alert, cooperative, no distress   Head:  Normocephalic, atraumatic. Eyes:  Conjunctivae clear   Neck: Supple       Lungs:   No distress. Chest wall:     Heart:     Abdomen:   Refuses abdominal exam   Extremities: Moves all. Skin:  No rash   Neurologic: CNII-XII intact. Labs:        No lab exists for component: ITNL   No results for input(s): CPK, CKMB, TROIQ in the last 72 hours.   Recent Labs      18   0258  18   1916  18   0340  18   0526   NA   --    --   135*  135*   K   --    --   4.1  4.1   CL   --    --   101  103   CO2   --    --   27  25   BUN   --    --   3*  1*   CREA   --    --   0.58  0.58   GLU   --    --   100  107*   MG  1.8   --   1.8  1.8   WBC  7.3  8.0  9.2  6.7   HGB  11.2*  11.8  11.8  11.1*   HCT  35.2  36.8 36.7  35.3   PLT  379  395  378  409*     Recent Labs      05/17/18   2130   INR  1.2*   PTP  12.5*   APTT  29.9     Needs: urine analysis, urine sodium, protein and creatinine  No results found for: ANGELY, CREAU      Cultures:     Lab Results   Component Value Date/Time    Specimen Description: URINE 01/18/2012 05:00 PM    Specimen Description: URINE 10/20/2010 11:35 AM    Specimen Description: URINE 08/31/2010 10:51 AM     Lab Results   Component Value Date/Time    Culture result: CANDIDA TROPICALIS (A) 05/11/2018 11:36 AM    Culture result: NO GROWTH 1 DAY 04/30/2018 05:04 AM    Culture result: MRSA NOT PRESENT 04/30/2018 04:54 AM    Culture result:  04/30/2018 04:54 AM         Screening of patient nares for MRSA is for surveillance purposes and, if positive, to facilitate isolation considerations in high risk settings. It is not intended for automatic decolonization interventions per se as regimens are not sufficiently effective to warrant routine use.        Radiology:     Medications       Current Facility-Administered Medications   Medication Dose Route Frequency Last Dose    sodium chloride (NS) flush 10-30 mL  10-30 mL InterCATHeter PRN      sodium chloride (NS) flush 10 mL  10 mL InterCATHeter Q24H 10 mL at 05/18/18 1328    sodium chloride (NS) flush 10 mL  10 mL InterCATHeter PRN      sodium chloride (NS) flush 10-40 mL  10-40 mL InterCATHeter Q8H 10 mL at 05/18/18 1328    sodium chloride (NS) flush 20 mL  20 mL InterCATHeter Q24H 20 mL at 05/18/18 1328    alteplase (CATHFLO) 1 mg in sterile water (preservative free) 1 mL injection  1 mg InterCATHeter PRN      cefTRIAXone (ROCEPHIN) 2 g in 0.9% sodium chloride (MBP/ADV) 50 mL  2 g IntraVENous Q24H 2 g at 05/17/18 1752    bisacodyl (DULCOLAX) suppository 10 mg  10 mg Rectal DAILY 10 mg at 05/18/18 0831    acetaminophen (TYLENOL) tablet 650 mg  650 mg Oral Q6H  mg at 05/14/18 1148    HYDROcodone-acetaminophen (NORCO) 5-325 mg per tablet 1 Tab 1 Tab Oral Q6H PRN 1 Tab at 05/18/18 1327    metroNIDAZOLE (FLAGYL) IVPB premix 500 mg  500 mg IntraVENous Q12H 500 mg at 05/18/18 1327    busPIRone (BUSPAR) tablet 5 mg  5 mg Oral TID WITH MEALS 5 mg at 05/18/18 1327    QUEtiapine (SEROquel) tablet 25 mg  25 mg Oral QHS 25 mg at 05/17/18 2145    sodium chloride (NS) flush 5-10 mL  5-10 mL IntraVENous Q8H 10 mL at 05/18/18 1328    sodium chloride (NS) flush 5-10 mL  5-10 mL IntraVENous PRN      naloxone (NARCAN) injection 0.4 mg  0.4 mg IntraVENous PRN      ondansetron (ZOFRAN) injection 4 mg  4 mg IntraVENous Q4H PRN 4 mg at 05/13/18 2117    docusate sodium (COLACE) capsule 100 mg  100 mg Oral  mg at 05/18/18 0831           Case discussed with:      Angelica Woods DO

## 2018-05-18 NOTE — PROGRESS NOTES
Bedside and Verbal shift change report given to [facility] (oncoming nurse) by Brigida Siddiqui RN (offgoing nurse). Report included the following information SBAR, Kardex, OR Summary, Procedure Summary, Intake/Output, MAR and Recent Results.

## 2018-05-18 NOTE — PROGRESS NOTES
Problem: Falls - Risk of  Goal: *Absence of Falls  Document Abhi Fall Risk and appropriate interventions in the flowsheet. Outcome: Progressing Towards Goal  Fall Risk Interventions:       Mentation Interventions: Door open when patient unattended, Bed/chair exit alarm, Adequate sleep, hydration, pain control, More frequent rounding, Reorient patient, Room close to nurse's station, Update white board    Medication Interventions: Bed/chair exit alarm, Patient to call before getting OOB, Teach patient to arise slowly    Elimination Interventions: Bed/chair exit alarm, Call light in reach, Patient to call for help with toileting needs             Problem: Pressure Injury - Risk of  Goal: *Prevention of pressure injury  Document Dimitris Scale and appropriate interventions in the flowsheet.    Outcome: Progressing Towards Goal  Pressure Injury Interventions:  Sensory Interventions: Assess need for specialty bed, Assess changes in LOC, Check visual cues for pain    Moisture Interventions: Absorbent underpads    Activity Interventions: Increase time out of bed, Pressure redistribution bed/mattress(bed type), PT/OT evaluation    Mobility Interventions: HOB 30 degrees or less, Pressure redistribution bed/mattress (bed type), PT/OT evaluation    Nutrition Interventions: Document food/fluid/supplement intake    Friction and Shear Interventions: Lift sheet, HOB 30 degrees or less

## 2018-05-18 NOTE — CONSULTS
[facility]. Belle Driscoll M.D.  [phone]                    GASTROENTEROLOGY CONSULTATION NOTE              NAME:  Liza Moon   :   1934   MRN:   [id number]       Referring Physician:    Dr. Conchis Guzman Date:   2018 12:43 PM    Chief Complaint:    Liza Moon is a 80 y.o. female who presented to the ED from [address] [place] [address] with c/o abdominal pain. She had a recent  lap appendectomy for a perforated appendicitis. She has advanced dementia and history obtained from charts and family at bedside. Per her family she started having increased abdominal pain over the last few days and stopped eating. No fever or chills reported. CT shows several small intra-abdominal abscesses and small amount of free air. She is being treated with IV Abx and her leukocytosis is better. She was noted to have 2 episodes of passing blood clots from her rectum. No mounika GI bleeding has been noted. Rectal exam done showed bright red blood also    GI has been consulted for evaluation and management of hematochezia     History of Present Illness:    Patient is a 80 y.o. who      PMH:  Past Medical History:   Diagnosis Date    Alzheimer disease     Anxiety 2010    GERD (gastroesophageal reflux disease) 2010    History of vascular access device 2018    PICC line double lumen for TPN, R basilic    Mild dementia     UTI (lower urinary tract infection) 2010       PSH:  Past Surgical History:   Procedure Laterality Date    ENDOSCOPY, COLON, DIAGNOSTIC      HX CATARACT REMOVAL  2011    left    HX UROLOGICAL      bladder uplift       Allergies: Allergies   Allergen Reactions    Macrodantin [Nitrofurantoin Macrocrystalline] Rash    Pcn [Penicillins] Rash    Sulfa (Sulfonamide Antibiotics) Rash       Home Medications:  Prior to Admission Medications   Prescriptions Last Dose Informant Patient Reported? Taking?    QUEtiapine (SEROQUEL) 25 mg tablet 5/10/2018 at Unknown time Transfer Papers Yes Yes   Sig: Take 25 mg by mouth nightly. acetaminophen (TYLENOL) 325 mg tablet  Transfer Papers No Yes   Sig: Take 2 Tabs by mouth every six (6) hours as needed for Pain for up to 30 days. busPIRone (BUSPAR) 5 mg tablet 5/11/2018 at am Transfer Papers Yes Yes   Sig: Take 5 mg by mouth three (3) times daily (with meals). docusate sodium (COLACE) 100 mg capsule  Transfer Papers No Yes   Sig: Take 1 Cap by mouth two (2) times daily as needed for Constipation for up to 90 days. furosemide (LASIX) 20 mg tablet 5/11/2018 at Unknown time Transfer Papers Yes Yes   Sig: Take 20 mg by mouth daily. Indications: Edema   levoFLOXacin (LEVAQUIN) 750 mg tablet 5/11/2018 at am Transfer Papers No Yes   Sig: Take 1 Tab by mouth daily for 9 days. metroNIDAZOLE (FLAGYL) 500 mg tablet 5/11/2018 at am Transfer Papers No Yes   Sig: Take 1 Tab by mouth three (3) times daily for 9 days. oxyCODONE IR (ROXICODONE) 5 mg immediate release tablet  Transfer Papers No Yes   Sig: Take 1 Tab by mouth every four (4) hours as needed. Max Daily Amount: 30 mg.       Facility-Administered Medications: None       Hospital Medications:  Current Facility-Administered Medications   Medication Dose Route Frequency    sodium chloride (NS) flush 10-30 mL  10-30 mL InterCATHeter PRN    sodium chloride (NS) flush 10 mL  10 mL InterCATHeter Q24H    sodium chloride (NS) flush 10 mL  10 mL InterCATHeter PRN    sodium chloride (NS) flush 10-40 mL  10-40 mL InterCATHeter Q8H    sodium chloride (NS) flush 20 mL  20 mL InterCATHeter Q24H    alteplase (CATHFLO) 1 mg in sterile water (preservative free) 1 mL injection  1 mg InterCATHeter PRN    cefTRIAXone (ROCEPHIN) 2 g in 0.9% sodium chloride (MBP/ADV) 50 mL  2 g IntraVENous Q24H    bisacodyl (DULCOLAX) suppository 10 mg  10 mg Rectal DAILY    acetaminophen (TYLENOL) tablet 650 mg  650 mg Oral Q6H PRN    HYDROcodone-acetaminophen (NORCO) 5-325 mg per tablet 1 Tab  1 Tab Oral Q6H PRN    metroNIDAZOLE (FLAGYL) IVPB premix 500 mg  500 mg IntraVENous Q12H    busPIRone (BUSPAR) tablet 5 mg  5 mg Oral TID WITH MEALS    QUEtiapine (SEROquel) tablet 25 mg  25 mg Oral QHS    sodium chloride (NS) flush 5-10 mL  5-10 mL IntraVENous Q8H    sodium chloride (NS) flush 5-10 mL  5-10 mL IntraVENous PRN    naloxone (NARCAN) injection 0.4 mg  0.4 mg IntraVENous PRN    ondansetron (ZOFRAN) injection 4 mg  4 mg IntraVENous Q4H PRN    docusate sodium (COLACE) capsule 100 mg  100 mg Oral BID       Social History:  Social History   Substance Use Topics    Smoking status: Never Smoker    Smokeless tobacco: Never Used    Alcohol use No       Family History:  Family History   Problem Relation Age of Onset    Cancer Father        Review of Systems:  Constitutional: negative fever, negative chills, negative weight loss  Eyes:   negative visual changes  ENT:   negative sore throat, tongue or lip swelling  Respiratory:  negative cough, negative dyspnea  Cards:  negative for chest pain, palpitations, lower extremity edema  GI:   See HPI  :  negative for frequency, dysuria  Integument:  negative for rash and pruritus  Heme:  negative for easy bruising and gum/nose bleeding  Musculoskel: negative for myalgias,  back pain and muscle weakness  Neuro: negative for headaches, dizziness, vertigo  Psych:  negative for feelings of anxiety, depression     Objective:   Patient Vitals for the past 8 hrs:   BP Temp Pulse Resp SpO2   05/18/18 1205 151/77 98.9 °F (37.2 °C) 84 16 97 %   05/18/18 0917 136/65 98.2 °F (36.8 °C) 85 16 96 %     05/18 0701 - 05/18 1900  In: 750 [P.O.:350;  I.V.:400]  Out: -   05/16 1901 - 05/18 0700  In: 300 [P.O.:300]  Out: 1475 [Urine:1475]    EXAM:     NEURO-no focal neurological deficits, moves all extremities well, no involuntary movements, reflexes at knee and ankle intact, alert to person only   HEENT-Head: Normocephalic, no lesions, without obvious abnormality. LUNGS-clear to auscultation bilaterally    COR-regular rate and rhythym     ABD- soft, non-tender. Bowel sounds normal. No masses,  no organomegaly     EXT-no edema    Skin - No rash     Data Review     Recent Labs      05/18/18   0258  05/17/18   1916   WBC  7.3  8.0   HGB  11.2*  11.8   HCT  35.2  36.8   PLT  379  395     Recent Labs      05/17/18   0340  05/16/18   0526   NA  135*  135*   K  4.1  4.1   CL  101  103   CO2  27  25   BUN  3*  1*   CREA  0.58  0.58   GLU  100  107*   CA  9.1  8.9     No results for input(s): SGOT, GPT, AP, TBIL, TP, ALB, GLOB, GGT, AML, LPSE in the last 72 hours. No lab exists for component: AMYP, HLPSE  Recent Labs      05/17/18   2130   INR  1.2*   PTP  12.5*   APTT  29.9       Patient Active Problem List   Diagnosis Code    Anxiety F41.9    GERD (gastroesophageal reflux disease) K21.9    Dizziness R42    Nonspecific abnormal electrocardiogram (ECG) (EKG) R94.31    Urinary urgency R39.15    Fatigue R53.83    Dementia without behavioral disturbance F03.90    Memory loss R41.3    Abdominal pain R10.9    Prediabetes R73.03    SIRS (systemic inflammatory response syndrome) (HCC) R65.10    Intra-abdominal abscess (HCC) K65.1       Assessment and Plan:  · Hematochezia  · This is likely hemorrhoidal in nature vs occult malignancy  · Her hgb is stable  · I had a detailed discussion with the family and they would like a conservative approach that I am agreeable with  · For now continue supportive care with IV Abx  · May use hydrocortisone supps if needed  · Monitor H&H closely    I will see again on request. Please call back for any questions. Thanks for allowing me to participate in the care of this patient.   Signed By: Lucie Mi MD     5/18/2018  12:43 PM

## 2018-05-18 NOTE — CONSULTS
Palliative Medicine Consult  Carlos Enrique: [phone] ([phone])    Patient Name: Gillian Moreira  YOB: 1934    Date of Initial Consult: 05/17/2018  Reason for Consult: Care decisions  Requesting Provider: Leticia BASS   Primary Care Physician: Ale Gallardo MD     SUMMARY:   Gillian Moreira is a 80 y.o. with a past history of anxiety, GERD, alzheimer's dementia, who was admitted on 5/11/2018 from [facility] with a diagnosis of intra-abdominal abscess. Patient recently admitted at [facility] 4/29 and underwent lap appendectomy for ruptured appendix and was discharged to [facility]  5/3 for continued rehabilitation. Presented to [facility] ED with complaints of generalized abdominal pain with distention. CT scan revealed free air and inflammatory process in the pelvis which seems to be centered around a small bowel loop where there is localized extraluminal gas and mild bowel wall thickening, as well as a few interloop fluid collections suspicious for small abscesses. Patient was admitted for conservative management with broad spectrum antibiotics, as they were too small for US guided drainage Follow-up CT scan on 5/15 showed overall improvement with improved free intraperitoneal air and improved abscesses. Current medical issues leading to Palliative Medicine involvement include: Care decisions due to progressive decline, alzheimer's dementia. SH:  to [place] for 60 years. Has son Jennifer Ríos and daughter Reinaldo Patino. Interim History:  05/18--> BRBPR overnight, GI consulted but hemoglobin stable. Thought to be likely hemorrhoidal in nature and family elected for conservative management . PALLIATIVE DIAGNOSES:   1. Agitation  2. Impaired Memory  3. Physical debility  4. Hypoalbuminemia  5. Goals of care  6. DNR       PLAN:   1. Met with patient,  ROSEMARIE, and daughter Reinaldo Patino in follow-up   2. Patient and family well known to palliative medicine team from previous admission 4/29-5/3. 3. Patient less agitated today, likely due to family presence at bedside. Repeatedly asking if I know her family doctor and why she is in the hospital. She is reoriented easily but quickly forgets. Psychiatry following for medication adjustments. Family requesting sitter for patient for safety. Relayed this to bedside nurse. 4. Goals of care--> Discussed current hospitalization after recent admission for ruptured appendix. Edison Crooks and Alicja Amador both describing progressive decline since surgery, really not participating with therapies at [facility] and having decreased appetite with limited oral intake. Discussed active infection despite oral antibiotic regimen she with discharged on, as well as new recommendations for antibiotics to broaden coverage in order to resolve abscesses. Explained active infection could be playing a role in lack of improvement, but introduced the possibility of new baseline in functioning. Addressed patient's dementia as possible cause of poor oral intake as well as affecting her mobility. Family realizes that decline may continue, but hope for improvement with prolonged antibiotic course and rehab at [facility]. Family asked what their options were for the patient if she stopped eating all together and I explained artifical nutrition through feeding tube would be an option, that it would have little effect on overall disease trajectory, and to consider benefit versus burden. Family is adamant that they would not pursue feeding tube placement. Reiterated hospice services as an extra layer of support for patient but would not cover 24 hour care, which is what the patient will need if she fails to make gains at rehab (and  is no longer able to provide). Encouraged family to apply for medicaid which would cover LTC for the patient and hospice services could be provided at whatever facility she went to.  Family agreed that they would like patient to attempt rehab and I instructed them if she fails to make progress to contact hospice agency for info session and screening. Consult placed to CM to consult [facility] to assist family with medicaid application. 5. DNR--> Extensive discussion with  and DDNR has been completed and is on chart. 6. Initial consult note routed to primary continuity provider  7. Communicated plan of care with: Palliative IDT       GOALS OF CARE / TREATMENT PREFERENCES:     GOALS OF CARE:  Patient/Health Care Proxy Stated Goals: Rehabilitation      TREATMENT PREFERENCES:   Code Status: DNR    Advance Care Planning:  Advance Care Planning 5/17/2018   Patient's Healthcare Decision Maker is: Verbal statement (Legal Next of Kin remains as decision maker)   Primary Decision Maker Name Itzel Mahajan   Primary Decision Maker Phone Number [phone]   Primary Decision Maker Relationship to Patient -   Confirm Advance Directive Yes, not on file   Patient Would Like to Complete Advance Directive -   Does the patient have other document types Do Not Resuscitate       Medical Interventions: Limited additional interventions   Other Instructions: DNR  Artificially Administered Nutrition: No feeding tube     Other:    As far as possible, the palliative care team has discussed with patient / health care proxy about goals of care / treatment preferences for patient. HISTORY:     History obtained from: chart    CHIEF COMPLAINT: abdominal pain    HPI/SUBJECTIVE:    The patient is:   [] Verbal and participatory  [x] Non-participatory due to: dementia    Patient recently admitted at [facility] 4/29 and underwent lap appendectomy for ruptured appendix and was discharged to [facility]  5/3 for continued rehabilitation. Presented to [facility] ED with complaints of generalized abdominal pain with distention.  CT scan revealed free air and inflammatory process in the pelvis which seems to be centered around a small bowel loop where there is localized extraluminal gas and mild bowel wall thickening, as well as a few interloop fluid collections suspicious for small abscesses. Patient was admitted for conservative management with broad spectrum antibiotics, as they were too small for US guided drainage Follow-up CT scan on 5/15 showed overall improvement with improved free intraperitoneal air and improved abscesses. Interim History:  05/18--> BRBPR overnight, GI consulted but hemoglobin stable. Thought to be likely hemorrhoidal in nature and family elected for conservative management .     Clinical Pain Assessment (nonverbal scale for severity on nonverbal patients):   Clinical Pain Assessment  Severity: 0     Activity (Movement): Lying quietly, normal position    Duration: for how long has pt been experiencing pain (e.g., 2 days, 1 month, years)  Frequency: how often pain is an issue (e.g., several times per day, once every few days, constant)     FUNCTIONAL ASSESSMENT:     Palliative Performance Scale (PPS):  PPS: 50       PSYCHOSOCIAL/SPIRITUAL SCREENING:     Palliative IDT has assessed this patient for cultural preferences / practices and a referral made as appropriate to needs (Cultural Services, Patient Advocacy, Ethics, etc.)    Advance Care Planning:  Advance Care Planning 5/17/2018   Patient's Healthcare Decision Maker is: Verbal statement (Legal Next of Kin remains as decision maker)   Primary Decision Maker Name Mercedes Sahu   Primary Decision Maker Phone Number [phone]   Primary Decision Maker Relationship to Patient -   Confirm Advance Directive Yes, not on file   Patient Would Like to Complete Advance Directive -   Does the patient have other document types Do Not Resuscitate       Any spiritual / Druze concerns:  [] Yes /  [x] No    Caregiver Burnout:  [x] Yes /  [] No /  [] No Caregiver Present      Anticipatory grief assessment:   [x] Normal  / [] Maladaptive       ESAS Anxiety: Anxiety: 2    ESAS Depression:          REVIEW OF SYSTEMS:     Positive and pertinent negative findings in ROS are noted above in HPI. The following systems were [] reviewed / [x] unable to be reviewed as noted in HPI  Other findings are noted below. Systems: constitutional, ears/nose/mouth/throat, respiratory, gastrointestinal, genitourinary, musculoskeletal, integumentary, neurologic, psychiatric, endocrine. Positive findings noted below. Modified ESAS Completed by: provider           Pain: 0   Anxiety: 2       Dyspnea: 0           Stool Occurrence(s): 1        PHYSICAL EXAM:     From RN flowsheet:  Wt Readings from Last 3 Encounters:   05/17/18 130 lb 1.1 oz (59 kg)   05/01/18 130 lb 8.2 oz (59.2 kg)   04/05/18 119 lb (54 kg)     Blood pressure 151/77, pulse 84, temperature 98.9 °F (37.2 °C), resp. rate 16, height 5' 3\" (1.6 m), weight 130 lb 1.1 oz (59 kg), last menstrual period 01/31/1995, SpO2 97 %.     Pain Scale 1: Numeric (0 - 10)  Pain Intensity 1: 0  Pain Onset 1: acute  Pain Location 1: Abdomen  Pain Orientation 1: Anterior  Pain Description 1: Aching  Pain Intervention(s) 1: Medication (see MAR)  Last bowel movement, if known:     Constitutional: Sitting in bed, NAD  Eyes: pupils equal, anicteric  ENMT: no nasal discharge, moist mucous membranes  Cardiovascular: regular rhythm, distal pulses intact  Respiratory: breathing not labored, symmetric  Gastrointestinal: soft non-tender, +bowel sounds  Musculoskeletal: no deformity, no tenderness to palpation  Skin: warm, dry  Neurologic: following commands, forgetfully, moving all extremities  Psychiatric: anxious at times, no hallucinations  Other:       HISTORY:     Active Problems:    Intra-abdominal abscess (HCC) (5/11/2018)      Past Medical History:   Diagnosis Date    Alzheimer disease     Anxiety 4/23/2010    GERD (gastroesophageal reflux disease) 4/23/2010    History of vascular access device 05/16/2018    PICC line double lumen for TPN, R basilic    Mild dementia     UTI (lower urinary tract infection) 4/23/2010      Past Surgical History:   Procedure Laterality Date    ENDOSCOPY, COLON, DIAGNOSTIC      HX CATARACT REMOVAL  5/2011    left    HX UROLOGICAL      bladder uplift      Family History   Problem Relation Age of Onset    Cancer Father       History reviewed, no pertinent family history.   Social History   Substance Use Topics    Smoking status: Never Smoker    Smokeless tobacco: Never Used    Alcohol use No     Allergies   Allergen Reactions    Macrodantin [Nitrofurantoin Macrocrystalline] Rash    Pcn [Penicillins] Rash    Sulfa (Sulfonamide Antibiotics) Rash      Current Facility-Administered Medications   Medication Dose Route Frequency    sodium chloride (NS) flush 10-30 mL  10-30 mL InterCATHeter PRN    sodium chloride (NS) flush 10 mL  10 mL InterCATHeter Q24H    sodium chloride (NS) flush 10 mL  10 mL InterCATHeter PRN    sodium chloride (NS) flush 10-40 mL  10-40 mL InterCATHeter Q8H    sodium chloride (NS) flush 20 mL  20 mL InterCATHeter Q24H    alteplase (CATHFLO) 1 mg in sterile water (preservative free) 1 mL injection  1 mg InterCATHeter PRN    cefTRIAXone (ROCEPHIN) 2 g in 0.9% sodium chloride (MBP/ADV) 50 mL  2 g IntraVENous Q24H    bisacodyl (DULCOLAX) suppository 10 mg  10 mg Rectal DAILY    acetaminophen (TYLENOL) tablet 650 mg  650 mg Oral Q6H PRN    HYDROcodone-acetaminophen (NORCO) 5-325 mg per tablet 1 Tab  1 Tab Oral Q6H PRN    metroNIDAZOLE (FLAGYL) IVPB premix 500 mg  500 mg IntraVENous Q12H    busPIRone (BUSPAR) tablet 5 mg  5 mg Oral TID WITH MEALS    QUEtiapine (SEROquel) tablet 25 mg  25 mg Oral QHS    sodium chloride (NS) flush 5-10 mL  5-10 mL IntraVENous Q8H    sodium chloride (NS) flush 5-10 mL  5-10 mL IntraVENous PRN    naloxone (NARCAN) injection 0.4 mg  0.4 mg IntraVENous PRN    ondansetron (ZOFRAN) injection 4 mg  4 mg IntraVENous Q4H PRN    docusate sodium (COLACE) capsule 100 mg  100 mg Oral BID          LAB AND IMAGING FINDINGS:     Lab Results   Component Value Date/Time    WBC 7.3 05/18/2018 02:58 AM    HGB 11.2 (L) 05/18/2018 02:58 AM    PLATELET 940 30/71/9421 02:58 AM     Lab Results   Component Value Date/Time    Sodium 135 (L) 05/17/2018 03:40 AM    Potassium 4.1 05/17/2018 03:40 AM    Chloride 101 05/17/2018 03:40 AM    CO2 27 05/17/2018 03:40 AM    BUN 3 (L) 05/17/2018 03:40 AM    Creatinine 0.58 05/17/2018 03:40 AM    Calcium 9.1 05/17/2018 03:40 AM    Magnesium 1.8 05/18/2018 02:58 AM    Phosphorus 1.6 (L) 05/03/2018 12:36 AM      Lab Results   Component Value Date/Time    AST (SGOT) 20 05/11/2018 09:35 AM    Alk. phosphatase 60 05/11/2018 09:35 AM    Protein, total 6.4 05/11/2018 09:35 AM    Albumin 2.4 (L) 05/11/2018 09:35 AM    Globulin 4.0 05/11/2018 09:35 AM     Lab Results   Component Value Date/Time    INR 1.2 (H) 05/17/2018 09:30 PM    Prothrombin time 12.5 (H) 05/17/2018 09:30 PM    aPTT 29.9 05/17/2018 09:30 PM      No results found for: IRON, FE, TIBC, IBCT, PSAT, FERR   No results found for: PH, PCO2, PO2  No components found for: GLPOC   No results found for: CPK, CKMB             Total time: 35 min  Counseling / coordination time, spent as noted above: 30 min  > 50% counseling / coordination?: yes    Prolonged service was provided for  []30 min   []75 min in face to face time in the presence of the patient, spent as noted above. Time Start:   Time End:   Note: this can only be billed with 80057 (initial) or 03783 (follow up). If multiple start / stop times, list each separately.

## 2018-05-18 NOTE — PROGRESS NOTES
5/18/2018 3:37 PM   DISCHARGE TO [place] [id number] ON 5/19 AT [facility] [phone]). Nursing please call report to [phone]. Please print out pt's avs, mars, most recent physician progress note and scripts and add to pt's ambulance packet which has been started on pt's hardchart. 5/18/2018 1:18 PM Spoke with gen surg PA, planning for dishcarge tomorrow since GI is going to conservatively manage. Pt and pt's daughter discussed goals of care with Palliative team. Spoke with gen surg, PA, planning for discharge on 5/19. Confirmed with Ashley Serrano at [facility] they can accept pt on 5/19 after 1PM.  Pt's  was informed of discharge plan and transport time. Message sent to [employer] to assist with Medicaid application. 5/18/2018 9:04 AM Pt will not discharge today, and no weekend discharge anticipated. Lvm with admissions at [facility] to notify and cancelled transport with [facility], spoke with [facility]. Called and notified pt's  who was understanding. CM will follow.  Erika Young, [place]

## 2018-05-19 NOTE — DISCHARGE SUMMARY
Discharge Summary    Patient: Anastacio Camp               Sex: female          DOA: 5/11/2018  8:58 AM       YOB: 1934      Age:  80 y.o.        LOS:  LOS: 8 days                Discharge Date: 5/19/2018    Admission Diagnoses: Intra-abdominal abscess [facility])    Discharge Diagnoses:  Intra-abdominal abscess    Procedure:  None    Discharge Condition: Stable    Hospital Course: 80year-old female with Alzheimer's dementia, history of perforated appendicitis s/p laparoscopic appendectomy 4/30/18 admitted with intra-abdominal abscesses. There was no drainable fluid collection, she was managed with IV antibiotics. ID was consulted for outpatient IV antibiotics therapy, a PICC line was placed. During her hospitalization she had episodes of hematochezia. GI was consulted and recommended conservative management. Consults: GI, Palliative Care, ID    Significant Diagnostic Studies: Not applicable    Discharge Medications:     Current Discharge Medication List      START taking these medications    Details   cefTRIAXone 2 gram 2 g, ADDaptor 1 Device IVPB 2 g by IntraVENous route every twenty-four (24) hours for 26 days. Qty: 25 Dose, Refills: 0         CONTINUE these medications which have CHANGED    Details   metroNIDAZOLE (FLAGYL) 500 mg tablet Take 1 Tab by mouth three (3) times daily for 26 days. Qty: 78 Tab, Refills: 0         CONTINUE these medications which have NOT CHANGED    Details   busPIRone (BUSPAR) 5 mg tablet Take 5 mg by mouth three (3) times daily (with meals). QUEtiapine (SEROQUEL) 25 mg tablet Take 25 mg by mouth nightly. acetaminophen (TYLENOL) 325 mg tablet Take 2 Tabs by mouth every six (6) hours as needed for Pain for up to 30 days. Qty: 30 Tab, Refills: 5    Associated Diagnoses: S/P appendectomy      docusate sodium (COLACE) 100 mg capsule Take 1 Cap by mouth two (2) times daily as needed for Constipation for up to 90 days.   Qty: 30 Cap, Refills: 2    Associated Diagnoses: S/P appendectomy      oxyCODONE IR (ROXICODONE) 5 mg immediate release tablet Take 1 Tab by mouth every four (4) hours as needed. Max Daily Amount: 30 mg.  Qty: 15 Tab, Refills: 0    Associated Diagnoses: S/P appendectomy         STOP taking these medications       furosemide (LASIX) 20 mg tablet Comments:   Reason for Stopping:         levoFLOXacin (LEVAQUIN) 750 mg tablet Comments:   Reason for Stopping:               Activity/Diet/Wound Care: See patient administered discharge instructions.     Follow-up: 2 weeks    Lynda Steiner MD  [facility]  Pager:  [phone]  Office:  [phone]

## 2018-05-19 NOTE — PROGRESS NOTES
Patient had a large BM with mounika blood and blood clots. Spoke with MD on call. Orders to draw H&H. Will continue to monitor.

## 2018-05-19 NOTE — DISCHARGE INSTRUCTIONS
Patient Discharge Instructions    Adilson Price / [id number] : 1934    Admitted 2018 Discharged: 2018     Take Home Medications       · It is important that you take the medication exactly as they are prescribed. · Keep your medication in the bottles provided by the pharmacist and keep a list of the medication names, dosages, and times to be taken in your wallet. · Do not take other medications without consulting your doctor. What to do at Home    Recommended diet: Soft diet     Continue antibiotics as directed by Dr. Mylene Dillon through 2018    Follow up with Dr. Belinda Barth in 2 weeks. Call [facility] to make an appointment. Follow up with your family doctor for management of your chronic medical conditions. Follow up with [facility] for evaluation of urinary retention. Call Dr. Belinda Barth or go to the ER if you develop worsening pain, fever, vomiting, or any other symptoms that concern you.

## 2018-05-19 NOTE — ROUTINE PROCESS
Bedside and Verbal shift change report given to Radhika Piedra (oncoming nurse) by Eugenie Carrillo RN (offgoing nurse). Report included the following information SBAR, Kardex, Procedure Summary, Intake/Output, MAR and Recent Results.

## 2018-05-19 NOTE — PROGRESS NOTES
Report called in to Dana monsalve RN at [phone] per CM. All questions answered. Phone call left for follow up. Verfied patient being discharged with denise for urinary retention and PICC for continued IV antibiotics.

## 2018-05-19 NOTE — PROGRESS NOTES
General Surgery Progress Note      S: Overnight events noted, GI consulted recommended conservative management. Patient Vitals for the past 24 hrs:   Temp Pulse Resp BP SpO2   05/19/18 0745 98.4 °F (36.9 °C) 82 16 113/59 96 %   05/19/18 0700 - 89 - - -   05/19/18 0336 97.8 °F (36.6 °C) 88 18 133/75 97 %   05/19/18 0002 98.1 °F (36.7 °C) 82 18 132/67 96 %   05/18/18 2307 - 86 - - -   05/18/18 1927 98.1 °F (36.7 °C) 97 18 148/86 93 %   05/18/18 1512 98.3 °F (36.8 °C) 83 20 135/69 97 %   05/18/18 1500 - 96 - - -   05/18/18 1205 98.9 °F (37.2 °C) 84 16 151/77 97 %   05/18/18 0917 98.2 °F (36.8 °C) 85 16 136/65 96 %           Date 05/18/18 0700 - 05/19/18 0659 05/19/18 0700 - 05/20/18 0659   Shift 9763-8415 8389-7085 24 Hour Total 4551-0272 8515-0955 24 Hour Total   I  N  T  A  K  E   P.O. 350  350         P. O. 350  350       I.V.  (mL/kg/hr) 400  (0.6)  400  (0.3)         Volume (metroNIDAZOLE (FLAGYL) IVPB premix 500 mg) 400  400       Shift Total  (mL/kg) 750  (12.7)  750  (12.7)      O  U  T  P  U  T   Urine  (mL/kg/hr) 300  (0.4) 500  (0.7) 800  (0.6)         Urine Output (mL) (Urinary Catheter 05/17/18 Ryder) 300 500 800       Shift Total  (mL/kg) 300  (5.1) 500  (8.5) 800  (13.6)       -500 -50      Weight (kg) 59 59 59 59 59 59           Physical Exam:      General: Patient resting comfortably  Resp: non-labored  CV: RRR  Abdomen: soft, non-distended  Extremity: no edema    Lab Results   Component Value Date/Time    WBC 7.3 05/18/2018 02:58 AM    Hemoglobin (POC) 13.0 02/09/2012 09:47 AM    Hemoglobin (POC) 13.6 06/30/2010 12:06 PM    HGB 11.5 05/18/2018 09:06 PM    Hematocrit (POC) 40 06/30/2010 12:06 PM    HCT 35.6 05/18/2018 09:06 PM    PLATELET 427 57/20/6965 02:58 AM    MCV 90.0 05/18/2018 02:58 AM       Lab Results   Component Value Date/Time    Sodium 135 (L) 05/17/2018 03:40 AM    Potassium 4.1 05/17/2018 03:40 AM    Chloride 101 05/17/2018 03:40 AM    CO2 27 05/17/2018 03:40 AM    Anion gap 7 05/17/2018 03:40 AM    Glucose 100 05/17/2018 03:40 AM    BUN 3 (L) 05/17/2018 03:40 AM    Creatinine 0.58 05/17/2018 03:40 AM    BUN/Creatinine ratio 5 (L) 05/17/2018 03:40 AM    GFR est AA >60 05/17/2018 03:40 AM    GFR est non-AA >60 05/17/2018 03:40 AM    Calcium 9.1 05/17/2018 03:40 AM        Lab Results   Component Value Date/Time    INR 1.2 (H) 05/17/2018 09:30 PM    INR 1.1 06/30/2010 12:00 PM    Prothrombin time 12.5 (H) 05/17/2018 09:30 PM    Prothrombin time 10.9 06/30/2010 12:00 PM           A/P:  80year-old female with Alzheimer's dementia, history of perforated appendicitis s/p laparoscopic appendectomy admitted with intra-abdominal abscesses managed with IV antibiotics.     Plan for discharge to SNF today        Dulce Jonas MD

## 2018-05-22 NOTE — PROGRESS NOTES
This NN attempted to reach patients nurse to get an update on patient since she arrived to facility. Will try to reach out again this afternoon.     Called and left message with  to please send Med rec left our fax #, still unable to speak directly with anybody from facility

## 2018-05-24 NOTE — PROGRESS NOTES
Community Care Team Documentation for Patient in [facility]  Initial Follow Up       Patient was admitted to [address] [place] [address] from 5/11/18 to 5/19/18. Patient was discharged to [address] on 5/19/18 (date). Community Care Team followed up to [address] [place] [address] during this transition. Hospital Discharge diagnosis:  Intraabcominal abscess. RRAT score: 22    Advance Medical Directive on file in EMR? DDNR    Total Hospitalizations/ED visits last 6 months? IP - 2; ED - 0    PCP : Mary Zapien MD  Nurse Navigator in PCP office: Mary Allen  Note routed to Nurse Navigator team.    Per Caryn Peterson and team at [facility], Reviewed [place] questions to ensure patient arrived at SNF safely. PT/OT providing skilled therapy in SNF. Currently max assist for ADLs and toileting, supervision with WC, mod assist for transfers. Patient has significant cognitive deficits with dementia. Therapy on a 1 week trial basis. Medicaid application has been submitted. Family asking questions about hospice - SNF staff unsure if patient qualifies. Patient has Ryder catheter in place. Voiding trial to be done at [facility]. Plan for discharge home with . Date/home health agency TBD. PCP follow up to be scheduled once discharge date is established. Community Care Team will follow up weekly with Daniel Maria. Medications were not reconciled and general patient assessment was not completed during this skilled nursing facility outreach.      Shakila Mejia, MSN, RN, ACNS-BC, [facility]  Nurse Navigator, [email or web address] [phone]

## 2018-05-31 NOTE — Clinical Note
Pt may need PCP follow up, depending on disposition when leaving SNF mid June. I can't see any available appts for [facility]. Is she still at [facility]? Thanks.

## 2018-05-31 NOTE — PROGRESS NOTES
Community Care Team Documentation for Patient in [facility]  Subsequent Follow up     Patient remains at [facility] ([facility]). See previous [facility] Team notes. PCP : Caty Goss MD  Nurse Navigator in PCP office: Lord Adam    Per Leopoldo Funes and team at [facility], PT/OT continue with varied participation by patient. Barrier - confusion, refusing to get out of bed at times. Currently ambulating a maximum of 18 ft at min to mod assist.  IV antibiotic therapy continues through 6/14. Plan for discharge 6/15. Family considering disposition. TBD. Medications were not reconciled and general patient assessment was not completed during this skilled nursing facility outreach.      Ava Mcfarlane, MSN, RN, ACNS-BC, [facility]  Nurse Navigator, [employer] [phone]

## 2018-06-07 NOTE — PROGRESS NOTES
Community Care Team Documentation for Patient in [facility]  Subsequent Follow up     Patient remains at [employer] ([facility]). See previous [facility] Team notes. PCP : Lindsey Suggs MD  Nurse Navigator in PCP office: Malorie Camara    Per Darline Strange and IDT at SNF, Pt no longer being skilled by PT/OT. Pt is being skilled by nursing only for IV abx. IV abx to be completed by 6/14. Plan to DC home with  6/15. Family looking into [facility] (pt choice) and personal caregivers (pt has applied for Medicaid). Medications were not reconciled and general patient assessment was not completed during this skilled nursing facility outreach.      MARTITA Damon

## 2018-06-07 NOTE — Clinical Note
Pt no longer being skilled by PT/OT. Pt is being skilled by nursing only for IV abx. IV abx to be completed by 6/14. Plan to DC home with  6/15. Family looking into [facility] (pt choice) and personal caregivers (pt has applied for Medicaid).

## 2018-06-14 NOTE — PROGRESS NOTES
Community Care Team Documentation for Patient in [facility]  Subsequent Follow up     Patient remains at [facility] ([facility]). See previous [facility] Team notes. PCP : Sandra Gusman MD  Nurse Navigator in PCP office: Denita Adames       Per Ayesha Scott and team at [facility], IV Antibiotics completed today, 6/14. Plan for discharge 6/15 to home with  and [facility]. PCP follow up canceled. Medications were not reconciled and general patient assessment was not completed during this skilled nursing facility outreach.      Rolly Bull, MSN, RN, ACNS-BC, [facility]  Nurse Navigator, [employer] [phone]

## 2018-06-21 NOTE — PROGRESS NOTES
Community Care Team Documentation for Patient in [facility]  Discharge Note    Per SNF staff, patient discharged from [facility] ([facility]). See previous [facility] Team notes. PCP : Jose Dumont MD    Nurse Navigator in PCP office: April Uriarte  Note routed to Nurse Navigator team.    Per Sherral Castleman and team at [facility], Confirmed patient was discharged 6/15 to home with  and [facility]. SNF LOS:  27 days    Community Care Team will sign off at this time. Medications were not reconciled and general patient assessment was not completed during this skilled nursing facility outreach.      Mitchel Card, MSN, RN, ACNS-BC, [facility]  Nurse Navigator, [employer] [phone]

## 2018-07-19 ENCOUNTER — TELEPHONE (OUTPATIENT)
Dept: FAMILY MEDICINE CLINIC | Age: 83
End: 2018-07-19

## 2024-08-08 NOTE — PROGRESS NOTES
General Surgery Daily Progress Note    Patient: Gil Brown MRN: [id number]  SSN: [id number]    YOB: 1934  Age: 80 y.o. Sex: female      Admit Date: 5/11/2018    Subjective:   Alert, confused. Has had several BMs. Tolerating PO but poor appetite. Current Facility-Administered Medications   Medication Dose Route Frequency    sodium chloride (NS) flush 10-30 mL  10-30 mL InterCATHeter PRN    sodium chloride (NS) flush 10 mL  10 mL InterCATHeter Q24H    sodium chloride (NS) flush 10 mL  10 mL InterCATHeter PRN    sodium chloride (NS) flush 10-40 mL  10-40 mL InterCATHeter Q8H    sodium chloride (NS) flush 20 mL  20 mL InterCATHeter Q24H    alteplase (CATHFLO) 1 mg in sterile water (preservative free) 1 mL injection  1 mg InterCATHeter PRN    cefTRIAXone (ROCEPHIN) 2 g in 0.9% sodium chloride (MBP/ADV) 50 mL  2 g IntraVENous Q24H    bisacodyl (DULCOLAX) suppository 10 mg  10 mg Rectal DAILY    acetaminophen (TYLENOL) tablet 650 mg  650 mg Oral Q6H PRN    HYDROcodone-acetaminophen (NORCO) 5-325 mg per tablet 1 Tab  1 Tab Oral Q6H PRN    metroNIDAZOLE (FLAGYL) IVPB premix 500 mg  500 mg IntraVENous Q12H    busPIRone (BUSPAR) tablet 5 mg  5 mg Oral TID WITH MEALS    QUEtiapine (SEROquel) tablet 25 mg  25 mg Oral QHS    sodium chloride (NS) flush 5-10 mL  5-10 mL IntraVENous Q8H    sodium chloride (NS) flush 5-10 mL  5-10 mL IntraVENous PRN    naloxone (NARCAN) injection 0.4 mg  0.4 mg IntraVENous PRN    ondansetron (ZOFRAN) injection 4 mg  4 mg IntraVENous Q4H PRN    docusate sodium (COLACE) capsule 100 mg  100 mg Oral BID    enoxaparin (LOVENOX) injection 40 mg  40 mg SubCUTAneous Q24H        Objective:      05/15 1901 - 05/17 0700  In: 800 [P.O.:500;  I.V.:300]  Out: 2250 [Urine:2250]  Patient Vitals for the past 8 hrs:   BP Temp Pulse Resp SpO2   05/17/18 0750 125/69 97.9 °F (36.6 °C) 83 16 97 %   05/17/18 0703 - - 86 - -   05/17/18 0533 144/72 98 °F (36.7 °C) 94 17 96 % Nurse to nurse called to Meredith / 8A -18   Physical Exam:  General: Alert, confused  Lungs: Unlabored  Heart:  Regular rate and  rhythm  Abdomen: Soft, mild lower abdominal tenderness, non-distended. + bowel sounds. Extremities: Warm, moves all, no edema  Skin:  Warm and dry, no rash    Labs:   Recent Labs      05/17/18   0340   WBC  9.2   HGB  11.8   HCT  36.7   PLT  378     Recent Labs      05/17/18   0340   NA  135*   K  4.1   CL  101   CO2  27   GLU  100   BUN  3*   CREA  0.58   CA  9.1   MG  1.8       Assessment / Plan:   · Intra-abdominal abscess  · WBC normalized, afebrile, no peritonitis   · Repeat CT shows improvement in abscess and colonic inflammation   · Will be discharged on IV ABX x 4 weeks per ID recommendations   · Continue diet   · Mobilize as able. PT consult. · Urinary retention: voided small amounts after denise removal but with high residual (700cc) requiring straight cath. If this continues would replace denise and have her follow up with urology as outpatient. · Appreciate psychiatry eval. Will continue current meds. · Plan for discharge to SNF tomorrow.

## 2024-10-30 NOTE — ED TRIAGE NOTES
Pt arrived via EMS for new abdominal pain that started this AM per the NH. Post op from an appendectomy on 4/30/18. +lap sites noted with yellow bruising noted to abdomen, abdomen obviously distended. H/o alzheimer's. Other

## (undated) DEVICE — BAG SPEC REM 224ML W4XL6IN DIA10MM 1 HND GYN DISP ENDOPCH

## (undated) DEVICE — STERILE POLYISOPRENE POWDER-FREE SURGICAL GLOVES WITH EMOLLIENT COATING: Brand: PROTEXIS

## (undated) DEVICE — DEVON™ KNEE AND BODY STRAP 60" X 3" (1.5 M X 7.6 CM): Brand: DEVON

## (undated) DEVICE — UNIVERSAL STAPLER: Brand: ENDO GIA ULTRA

## (undated) DEVICE — APPLICATOR BNDG 1MM ADH PREMIERPRO EXOFIN

## (undated) DEVICE — TUBING INSUFLTN 10FT LUER -- CONVERT TO ITEM 368568

## (undated) DEVICE — SUTURE MCRYL SZ 4-0 L27IN ABSRB UD L19MM PS-2 1/2 CIR PRIM Y426H

## (undated) DEVICE — BLADELESS OPTICAL TROCAR WITH FIXATION CANNULA: Brand: VERSAPORT

## (undated) DEVICE — RELOAD STPL 45MM THCK TISS GRN W/ GRIPPING SURF TECHNOLOGY

## (undated) DEVICE — BLUNT TIP TROCAR: Brand: AUTO SUTURE

## (undated) DEVICE — SOL IRRIGATION INJ NACL 0.9% 500ML BTL

## (undated) DEVICE — UNIVERSAL FIXATION CANNULA: Brand: VERSAONE

## (undated) DEVICE — SURGICAL PROCEDURE KIT GEN LAPAROSCOPY LF

## (undated) DEVICE — LIGHT HANDLE: Brand: DEVON

## (undated) DEVICE — SUTURE PDS II SZ 0 L27IN ABSRB VLT L26MM CT-2 1/2 CIR Z334H

## (undated) DEVICE — DRAPE,REIN 53X77,STERILE: Brand: MEDLINE

## (undated) DEVICE — KENDALL SCD EXPRESS SLEEVES, KNEE LENGTH, MEDIUM: Brand: KENDALL SCD

## (undated) DEVICE — DEVICE TRNSF SPIK STL 2008S] MICROTEK MEDICAL INC]

## (undated) DEVICE — STERILE POLYISOPRENE POWDER-FREE SURGICAL GLOVES: Brand: PROTEXIS

## (undated) DEVICE — (D)PREP SKN CHLRAPRP APPL 26ML -- CONVERT TO ITEM 371833

## (undated) DEVICE — SYR 10ML CTRL LR LCK NSAF LF --

## (undated) DEVICE — DRAIN SURG 19FR SIL RND HUBLESS W/ 0.25IN BEND TRCR BLAK

## (undated) DEVICE — Device

## (undated) DEVICE — ARTICULATION RELOAD WITH TRI-STAPLE TECHNOLOGY: Brand: ENDO GIA

## (undated) DEVICE — 3000CC GUARDIAN II: Brand: GUARDIAN

## (undated) DEVICE — REM POLYHESIVE ADULT PATIENT RETURN ELECTRODE: Brand: VALLEYLAB

## (undated) DEVICE — SUTURE ETHLN SZ 2-0 L18IN NONABSORBABLE BLK L19MM PS-2 PRIM 593H

## (undated) DEVICE — INFECTION CONTROL KIT SYS

## (undated) DEVICE — NEEDLE HYPO 22GA L1.5IN BLK S STL HUB POLYPR SHLD REG BVL